# Patient Record
Sex: FEMALE | Race: WHITE | Employment: FULL TIME | URBAN - METROPOLITAN AREA
[De-identification: names, ages, dates, MRNs, and addresses within clinical notes are randomized per-mention and may not be internally consistent; named-entity substitution may affect disease eponyms.]

---

## 2017-10-17 ENCOUNTER — HOSPITAL ENCOUNTER (OUTPATIENT)
Dept: OTHER | Age: 37
Discharge: HOME | End: 2017-10-17
Attending: OBSTETRICS & GYNECOLOGY | Admitting: OBSTETRICS & GYNECOLOGY

## 2022-01-23 ENCOUNTER — APPOINTMENT (OUTPATIENT)
Dept: CT IMAGING | Age: 42
End: 2022-01-23
Payer: COMMERCIAL

## 2022-01-23 ENCOUNTER — APPOINTMENT (OUTPATIENT)
Dept: ULTRASOUND IMAGING | Age: 42
End: 2022-01-23
Payer: COMMERCIAL

## 2022-01-23 ENCOUNTER — HOSPITAL ENCOUNTER (EMERGENCY)
Age: 42
Discharge: HOME | End: 2022-01-24
Attending: EMERGENCY MEDICINE
Payer: COMMERCIAL

## 2022-01-23 DIAGNOSIS — R11.0 NAUSEA: ICD-10-CM

## 2022-01-23 DIAGNOSIS — R10.9 ABDOMINAL PAIN, UNSPECIFIED ABDOMINAL LOCATION: Primary | ICD-10-CM

## 2022-01-23 LAB
ALBUMIN SERPL-MCNC: 3.9 GM/DL (ref 3.4–5)
ALP BLD-CCNC: 101 IU/L (ref 40–129)
ALT SERPL-CCNC: 10 U/L (ref 10–40)
ANION GAP SERPL CALCULATED.3IONS-SCNC: 11 MMOL/L (ref 4–16)
AST SERPL-CCNC: 14 IU/L (ref 15–37)
BASOPHILS ABSOLUTE: 0.1 K/CU MM
BASOPHILS RELATIVE PERCENT: 0.7 % (ref 0–1)
BILIRUB SERPL-MCNC: 0.1 MG/DL (ref 0–1)
BUN BLDV-MCNC: 10 MG/DL (ref 6–23)
CALCIUM SERPL-MCNC: 8.3 MG/DL (ref 8.3–10.6)
CHLORIDE BLD-SCNC: 104 MMOL/L (ref 99–110)
CO2: 22 MMOL/L (ref 21–32)
CREAT SERPL-MCNC: 0.7 MG/DL (ref 0.6–1.1)
DIFFERENTIAL TYPE: ABNORMAL
EOSINOPHILS ABSOLUTE: 0.2 K/CU MM
EOSINOPHILS RELATIVE PERCENT: 2.8 % (ref 0–3)
GFR AFRICAN AMERICAN: >60 ML/MIN/1.73M2
GFR NON-AFRICAN AMERICAN: >60 ML/MIN/1.73M2
GLUCOSE BLD-MCNC: 93 MG/DL (ref 70–99)
HCT VFR BLD CALC: 40 % (ref 37–47)
HEMOGLOBIN: 12.4 GM/DL (ref 12.5–16)
IMMATURE NEUTROPHIL %: 0.3 % (ref 0–0.43)
LACTATE: 1.2 MMOL/L (ref 0.4–2)
LIPASE: 27 IU/L (ref 13–60)
LYMPHOCYTES ABSOLUTE: 1.7 K/CU MM
LYMPHOCYTES RELATIVE PERCENT: 24.3 % (ref 24–44)
MCH RBC QN AUTO: 29.6 PG (ref 27–31)
MCHC RBC AUTO-ENTMCNC: 31 % (ref 32–36)
MCV RBC AUTO: 95.5 FL (ref 78–100)
MONOCYTES ABSOLUTE: 0.4 K/CU MM
MONOCYTES RELATIVE PERCENT: 5 % (ref 0–4)
NUCLEATED RBC %: 0 %
PDW BLD-RTO: 12.7 % (ref 11.7–14.9)
PLATELET # BLD: 296 K/CU MM (ref 140–440)
PMV BLD AUTO: 9.8 FL (ref 7.5–11.1)
POTASSIUM SERPL-SCNC: 4 MMOL/L (ref 3.5–5.1)
RBC # BLD: 4.19 M/CU MM (ref 4.2–5.4)
SEGMENTED NEUTROPHILS ABSOLUTE COUNT: 4.8 K/CU MM
SEGMENTED NEUTROPHILS RELATIVE PERCENT: 66.9 % (ref 36–66)
SODIUM BLD-SCNC: 137 MMOL/L (ref 135–145)
TOTAL IMMATURE NEUTOROPHIL: 0.02 K/CU MM
TOTAL NUCLEATED RBC: 0 K/CU MM
TOTAL PROTEIN: 6.9 GM/DL (ref 6.4–8.2)
WBC # BLD: 7.2 K/CU MM (ref 4–10.5)

## 2022-01-23 PROCEDURE — 93975 VASCULAR STUDY: CPT

## 2022-01-23 PROCEDURE — 96361 HYDRATE IV INFUSION ADD-ON: CPT

## 2022-01-23 PROCEDURE — 2500000003 HC RX 250 WO HCPCS: Performed by: EMERGENCY MEDICINE

## 2022-01-23 PROCEDURE — 83605 ASSAY OF LACTIC ACID: CPT

## 2022-01-23 PROCEDURE — 96374 THER/PROPH/DIAG INJ IV PUSH: CPT

## 2022-01-23 PROCEDURE — 83690 ASSAY OF LIPASE: CPT

## 2022-01-23 PROCEDURE — 76856 US EXAM PELVIC COMPLETE: CPT

## 2022-01-23 PROCEDURE — 74176 CT ABD & PELVIS W/O CONTRAST: CPT

## 2022-01-23 PROCEDURE — 80053 COMPREHEN METABOLIC PANEL: CPT

## 2022-01-23 PROCEDURE — 6370000000 HC RX 637 (ALT 250 FOR IP): Performed by: EMERGENCY MEDICINE

## 2022-01-23 PROCEDURE — 99283 EMERGENCY DEPT VISIT LOW MDM: CPT

## 2022-01-23 PROCEDURE — 85025 COMPLETE CBC W/AUTO DIFF WBC: CPT

## 2022-01-23 PROCEDURE — 2580000003 HC RX 258: Performed by: EMERGENCY MEDICINE

## 2022-01-23 RX ORDER — 0.9 % SODIUM CHLORIDE 0.9 %
1000 INTRAVENOUS SOLUTION INTRAVENOUS ONCE
Status: COMPLETED | OUTPATIENT
Start: 2022-01-23 | End: 2022-01-24

## 2022-01-23 RX ORDER — ONDANSETRON 4 MG/1
4 TABLET, ORALLY DISINTEGRATING ORAL ONCE
Status: COMPLETED | OUTPATIENT
Start: 2022-01-23 | End: 2022-01-23

## 2022-01-23 RX ORDER — MORPHINE SULFATE 2 MG/ML
4 INJECTION, SOLUTION INTRAMUSCULAR; INTRAVENOUS EVERY 30 MIN PRN
Status: DISCONTINUED | OUTPATIENT
Start: 2022-01-23 | End: 2022-01-24 | Stop reason: HOSPADM

## 2022-01-23 RX ORDER — DICYCLOMINE HYDROCHLORIDE 10 MG/ML
20 INJECTION INTRAMUSCULAR ONCE
Status: COMPLETED | OUTPATIENT
Start: 2022-01-23 | End: 2022-01-24

## 2022-01-23 RX ORDER — HYDROCODONE BITARTRATE AND ACETAMINOPHEN 5; 325 MG/1; MG/1
1 TABLET ORAL ONCE
Status: COMPLETED | OUTPATIENT
Start: 2022-01-23 | End: 2022-01-23

## 2022-01-23 RX ADMIN — HYDROCODONE BITARTRATE AND ACETAMINOPHEN 1 TABLET: 5; 325 TABLET ORAL at 23:21

## 2022-01-23 RX ADMIN — FAMOTIDINE 20 MG: 10 INJECTION, SOLUTION INTRAVENOUS at 23:20

## 2022-01-23 RX ADMIN — SODIUM CHLORIDE 1000 ML: 9 INJECTION, SOLUTION INTRAVENOUS at 23:21

## 2022-01-23 RX ADMIN — ONDANSETRON 4 MG: 4 TABLET, ORALLY DISINTEGRATING ORAL at 23:21

## 2022-01-23 ASSESSMENT — ENCOUNTER SYMPTOMS
ABDOMINAL PAIN: 1
EYES NEGATIVE: 1
RESPIRATORY NEGATIVE: 1
ALLERGIC/IMMUNOLOGIC NEGATIVE: 1
NAUSEA: 1

## 2022-01-23 ASSESSMENT — PAIN SCALES - GENERAL
PAINLEVEL_OUTOF10: 9
PAINLEVEL_OUTOF10: 10

## 2022-01-23 NOTE — Clinical Note
Pita Mathew was seen and treated in our emergency department on 1/23/2022. She may return to work on 01/27/2022. If you have any questions or concerns, please don't hesitate to call.       Amando Duckworth, DO

## 2022-01-24 ENCOUNTER — APPOINTMENT (OUTPATIENT)
Dept: CT IMAGING | Age: 42
DRG: 742 | End: 2022-01-24
Payer: COMMERCIAL

## 2022-01-24 ENCOUNTER — HOSPITAL ENCOUNTER (INPATIENT)
Age: 42
LOS: 3 days | Discharge: HOME | DRG: 742 | End: 2022-01-27
Attending: EMERGENCY MEDICINE | Admitting: SURGERY
Payer: COMMERCIAL

## 2022-01-24 ENCOUNTER — ANESTHESIA (OUTPATIENT)
Dept: OPERATING ROOM | Age: 42
DRG: 742 | End: 2022-01-24
Payer: COMMERCIAL

## 2022-01-24 ENCOUNTER — ANESTHESIA EVENT (OUTPATIENT)
Dept: OPERATING ROOM | Age: 42
DRG: 742 | End: 2022-01-24
Payer: COMMERCIAL

## 2022-01-24 VITALS
SYSTOLIC BLOOD PRESSURE: 151 MMHG | TEMPERATURE: 98.6 F | OXYGEN SATURATION: 100 % | DIASTOLIC BLOOD PRESSURE: 88 MMHG | RESPIRATION RATE: 12 BRPM

## 2022-01-24 VITALS
HEART RATE: 97 BPM | DIASTOLIC BLOOD PRESSURE: 109 MMHG | OXYGEN SATURATION: 95 % | SYSTOLIC BLOOD PRESSURE: 169 MMHG | RESPIRATION RATE: 20 BRPM | TEMPERATURE: 97.5 F

## 2022-01-24 DIAGNOSIS — R10.31 RIGHT LOWER QUADRANT ABDOMINAL PAIN: ICD-10-CM

## 2022-01-24 DIAGNOSIS — K66.1 HEMOPERITONEUM: ICD-10-CM

## 2022-01-24 DIAGNOSIS — N83.511 TORSION OF RIGHT OVARY AND OVARIAN PEDICLE: Primary | ICD-10-CM

## 2022-01-24 LAB
ALBUMIN SERPL-MCNC: 3.9 GM/DL (ref 3.4–5)
ALP BLD-CCNC: 119 IU/L (ref 40–128)
ALT SERPL-CCNC: 19 U/L (ref 10–40)
ANION GAP SERPL CALCULATED.3IONS-SCNC: 9 MMOL/L (ref 4–16)
AST SERPL-CCNC: 34 IU/L (ref 15–37)
BACTERIA: ABNORMAL /HPF
BACTERIA: NEGATIVE /HPF
BASOPHILS ABSOLUTE: 0 K/CU MM
BASOPHILS RELATIVE PERCENT: 0.3 % (ref 0–1)
BILIRUB SERPL-MCNC: 0.4 MG/DL (ref 0–1)
BILIRUBIN URINE: NEGATIVE MG/DL
BILIRUBIN URINE: NEGATIVE MG/DL
BLOOD, URINE: NEGATIVE
BLOOD, URINE: NEGATIVE
BUN BLDV-MCNC: 9 MG/DL (ref 6–23)
CALCIUM SERPL-MCNC: 8.3 MG/DL (ref 8.3–10.6)
CHLORIDE BLD-SCNC: 101 MMOL/L (ref 99–110)
CLARITY: ABNORMAL
CLARITY: CLEAR
CO2: 25 MMOL/L (ref 21–32)
COLOR: ABNORMAL
COLOR: ABNORMAL
CREAT SERPL-MCNC: 0.6 MG/DL (ref 0.6–1.1)
DIFFERENTIAL TYPE: ABNORMAL
EOSINOPHILS ABSOLUTE: 0.1 K/CU MM
EOSINOPHILS RELATIVE PERCENT: 0.6 % (ref 0–3)
GFR AFRICAN AMERICAN: >60 ML/MIN/1.73M2
GFR NON-AFRICAN AMERICAN: >60 ML/MIN/1.73M2
GLUCOSE BLD-MCNC: 115 MG/DL (ref 70–99)
GLUCOSE, URINE: NEGATIVE MG/DL
GLUCOSE, URINE: NEGATIVE MG/DL
HCT VFR BLD CALC: 34.3 % (ref 37–47)
HEMOGLOBIN: 10.9 GM/DL (ref 12.5–16)
IMMATURE NEUTROPHIL %: 0.3 % (ref 0–0.43)
KETONES, URINE: ABNORMAL MG/DL
KETONES, URINE: NEGATIVE MG/DL
LEUKOCYTE ESTERASE, URINE: ABNORMAL
LEUKOCYTE ESTERASE, URINE: NEGATIVE
LIPASE: 14 IU/L (ref 13–60)
LYMPHOCYTES ABSOLUTE: 1.3 K/CU MM
LYMPHOCYTES RELATIVE PERCENT: 10.3 % (ref 24–44)
MCH RBC QN AUTO: 29.9 PG (ref 27–31)
MCHC RBC AUTO-ENTMCNC: 31.8 % (ref 32–36)
MCV RBC AUTO: 94.2 FL (ref 78–100)
MONOCYTES ABSOLUTE: 0.6 K/CU MM
MONOCYTES RELATIVE PERCENT: 4.6 % (ref 0–4)
MUCUS: ABNORMAL HPF
NITRITE URINE, QUANTITATIVE: NEGATIVE
NITRITE URINE, QUANTITATIVE: POSITIVE
NUCLEATED RBC %: 0 %
PDW BLD-RTO: 12.7 % (ref 11.7–14.9)
PH, URINE: 5 (ref 5–8)
PH, URINE: 6 (ref 5–8)
PLATELET # BLD: 266 K/CU MM (ref 140–440)
PMV BLD AUTO: 9.7 FL (ref 7.5–11.1)
POTASSIUM SERPL-SCNC: 3.9 MMOL/L (ref 3.5–5.1)
PROTEIN UA: 30 MG/DL
PROTEIN UA: NEGATIVE MG/DL
RBC # BLD: 3.64 M/CU MM (ref 4.2–5.4)
RBC URINE: ABNORMAL /HPF (ref 0–6)
RBC URINE: ABNORMAL /HPF (ref 0–6)
SARS-COV-2, NAAT: NOT DETECTED
SEGMENTED NEUTROPHILS ABSOLUTE COUNT: 10.3 K/CU MM
SEGMENTED NEUTROPHILS RELATIVE PERCENT: 83.9 % (ref 36–66)
SODIUM BLD-SCNC: 135 MMOL/L (ref 135–145)
SOURCE: NORMAL
SPECIFIC GRAVITY UA: 1.01 (ref 1–1.03)
SPECIFIC GRAVITY UA: 1.03 (ref 1–1.03)
SQUAMOUS EPITHELIAL: 1 /HPF
SQUAMOUS EPITHELIAL: 8 /HPF
TOTAL IMMATURE NEUTOROPHIL: 0.04 K/CU MM
TOTAL NUCLEATED RBC: 0 K/CU MM
TOTAL PROTEIN: 6.7 GM/DL (ref 6.4–8.2)
TRICHOMONAS: ABNORMAL /HPF
TRICHOMONAS: ABNORMAL /HPF
UROBILINOGEN, URINE: NEGATIVE MG/DL (ref 0.2–1)
UROBILINOGEN, URINE: NEGATIVE MG/DL (ref 0.2–1)
WBC # BLD: 12.3 K/CU MM (ref 4–10.5)
WBC UA: 4 /HPF (ref 0–5)
WBC UA: ABNORMAL /HPF (ref 0–5)

## 2022-01-24 PROCEDURE — 1200000000 HC SEMI PRIVATE

## 2022-01-24 PROCEDURE — 6360000002 HC RX W HCPCS: Performed by: SURGERY

## 2022-01-24 PROCEDURE — 83690 ASSAY OF LIPASE: CPT

## 2022-01-24 PROCEDURE — 99223 1ST HOSP IP/OBS HIGH 75: CPT | Performed by: SURGERY

## 2022-01-24 PROCEDURE — APPNB180 APP NON BILLABLE TIME > 60 MINS: Performed by: PHYSICIAN ASSISTANT

## 2022-01-24 PROCEDURE — 85025 COMPLETE CBC W/AUTO DIFF WBC: CPT

## 2022-01-24 PROCEDURE — 96375 TX/PRO/DX INJ NEW DRUG ADDON: CPT

## 2022-01-24 PROCEDURE — 96376 TX/PRO/DX INJ SAME DRUG ADON: CPT

## 2022-01-24 PROCEDURE — 80053 COMPREHEN METABOLIC PANEL: CPT

## 2022-01-24 PROCEDURE — 6360000004 HC RX CONTRAST MEDICATION: Performed by: EMERGENCY MEDICINE

## 2022-01-24 PROCEDURE — 87635 SARS-COV-2 COVID-19 AMP PRB: CPT

## 2022-01-24 PROCEDURE — 87086 URINE CULTURE/COLONY COUNT: CPT

## 2022-01-24 PROCEDURE — C1894 INTRO/SHEATH, NON-LASER: HCPCS | Performed by: SURGERY

## 2022-01-24 PROCEDURE — 2500000003 HC RX 250 WO HCPCS: Performed by: NURSE ANESTHETIST, CERTIFIED REGISTERED

## 2022-01-24 PROCEDURE — 81001 URINALYSIS AUTO W/SCOPE: CPT

## 2022-01-24 PROCEDURE — 7100000001 HC PACU RECOVERY - ADDTL 15 MIN: Performed by: SURGERY

## 2022-01-24 PROCEDURE — 96361 HYDRATE IV INFUSION ADD-ON: CPT

## 2022-01-24 PROCEDURE — 6360000002 HC RX W HCPCS: Performed by: EMERGENCY MEDICINE

## 2022-01-24 PROCEDURE — 3600000004 HC SURGERY LEVEL 4 BASE: Performed by: SURGERY

## 2022-01-24 PROCEDURE — 6360000002 HC RX W HCPCS: Performed by: ANESTHESIOLOGY

## 2022-01-24 PROCEDURE — 0UB50ZZ EXCISION OF RIGHT FALLOPIAN TUBE, OPEN APPROACH: ICD-10-PCS | Performed by: SURGERY

## 2022-01-24 PROCEDURE — 7100000000 HC PACU RECOVERY - FIRST 15 MIN: Performed by: SURGERY

## 2022-01-24 PROCEDURE — 74177 CT ABD & PELVIS W/CONTRAST: CPT

## 2022-01-24 PROCEDURE — 44955 APPENDECTOMY ADD-ON: CPT | Performed by: PHYSICIAN ASSISTANT

## 2022-01-24 PROCEDURE — 6360000002 HC RX W HCPCS: Performed by: PHYSICIAN ASSISTANT

## 2022-01-24 PROCEDURE — 99284 EMERGENCY DEPT VISIT MOD MDM: CPT

## 2022-01-24 PROCEDURE — 58720 REMOVAL OF OVARY/TUBE(S): CPT | Performed by: SURGERY

## 2022-01-24 PROCEDURE — 3700000000 HC ANESTHESIA ATTENDED CARE: Performed by: SURGERY

## 2022-01-24 PROCEDURE — 96372 THER/PROPH/DIAG INJ SC/IM: CPT

## 2022-01-24 PROCEDURE — 0UB00ZZ EXCISION OF RIGHT OVARY, OPEN APPROACH: ICD-10-PCS | Performed by: SURGERY

## 2022-01-24 PROCEDURE — 96374 THER/PROPH/DIAG INJ IV PUSH: CPT

## 2022-01-24 PROCEDURE — 3700000001 HC ADD 15 MINUTES (ANESTHESIA): Performed by: SURGERY

## 2022-01-24 PROCEDURE — 88305 TISSUE EXAM BY PATHOLOGIST: CPT | Performed by: PATHOLOGY

## 2022-01-24 PROCEDURE — 88304 TISSUE EXAM BY PATHOLOGIST: CPT | Performed by: PATHOLOGY

## 2022-01-24 PROCEDURE — 3600000014 HC SURGERY LEVEL 4 ADDTL 15MIN: Performed by: SURGERY

## 2022-01-24 PROCEDURE — 2720000010 HC SURG SUPPLY STERILE: Performed by: SURGERY

## 2022-01-24 PROCEDURE — 2580000003 HC RX 258: Performed by: PHYSICIAN ASSISTANT

## 2022-01-24 PROCEDURE — 58720 REMOVAL OF OVARY/TUBE(S): CPT | Performed by: PHYSICIAN ASSISTANT

## 2022-01-24 PROCEDURE — 44955 APPENDECTOMY ADD-ON: CPT | Performed by: SURGERY

## 2022-01-24 PROCEDURE — 0DTJ0ZZ RESECTION OF APPENDIX, OPEN APPROACH: ICD-10-PCS | Performed by: SURGERY

## 2022-01-24 PROCEDURE — 6360000002 HC RX W HCPCS: Performed by: NURSE ANESTHETIST, CERTIFIED REGISTERED

## 2022-01-24 PROCEDURE — 2580000003 HC RX 258: Performed by: ANESTHESIOLOGY

## 2022-01-24 PROCEDURE — 2709999900 HC NON-CHARGEABLE SUPPLY: Performed by: SURGERY

## 2022-01-24 RX ORDER — CEFAZOLIN SODIUM 2 G/100ML
2000 INJECTION, SOLUTION INTRAVENOUS ONCE
Status: COMPLETED | OUTPATIENT
Start: 2022-01-24 | End: 2022-01-24

## 2022-01-24 RX ORDER — FENTANYL CITRATE 50 UG/ML
25 INJECTION, SOLUTION INTRAMUSCULAR; INTRAVENOUS EVERY 5 MIN PRN
Status: DISCONTINUED | OUTPATIENT
Start: 2022-01-24 | End: 2022-01-24 | Stop reason: HOSPADM

## 2022-01-24 RX ORDER — MORPHINE SULFATE 2 MG/ML
2 INJECTION, SOLUTION INTRAMUSCULAR; INTRAVENOUS
Status: DISCONTINUED | OUTPATIENT
Start: 2022-01-24 | End: 2022-01-25 | Stop reason: CLARIF

## 2022-01-24 RX ORDER — SODIUM CHLORIDE 9 MG/ML
INJECTION, SOLUTION INTRAVENOUS CONTINUOUS
Status: DISCONTINUED | OUTPATIENT
Start: 2022-01-24 | End: 2022-01-26

## 2022-01-24 RX ORDER — ONDANSETRON 4 MG/1
4 TABLET, ORALLY DISINTEGRATING ORAL EVERY 8 HOURS PRN
Qty: 15 TABLET | Refills: 0 | Status: SHIPPED | OUTPATIENT
Start: 2022-01-24 | End: 2022-02-10

## 2022-01-24 RX ORDER — HYDROCODONE BITARTRATE AND ACETAMINOPHEN 5; 325 MG/1; MG/1
1-2 TABLET ORAL EVERY 6 HOURS PRN
Qty: 10 TABLET | Refills: 0 | Status: ON HOLD | OUTPATIENT
Start: 2022-01-24 | End: 2022-01-27 | Stop reason: HOSPADM

## 2022-01-24 RX ORDER — FENTANYL CITRATE 50 UG/ML
50 INJECTION, SOLUTION INTRAMUSCULAR; INTRAVENOUS EVERY 5 MIN PRN
Status: DISCONTINUED | OUTPATIENT
Start: 2022-01-24 | End: 2022-01-24 | Stop reason: HOSPADM

## 2022-01-24 RX ORDER — ONDANSETRON 2 MG/ML
4 INJECTION INTRAMUSCULAR; INTRAVENOUS EVERY 30 MIN PRN
Status: DISCONTINUED | OUTPATIENT
Start: 2022-01-24 | End: 2022-01-24

## 2022-01-24 RX ORDER — ROCURONIUM BROMIDE 10 MG/ML
INJECTION, SOLUTION INTRAVENOUS PRN
Status: DISCONTINUED | OUTPATIENT
Start: 2022-01-24 | End: 2022-01-24 | Stop reason: SDUPTHER

## 2022-01-24 RX ORDER — LIDOCAINE HYDROCHLORIDE 20 MG/ML
INJECTION, SOLUTION INTRAVENOUS PRN
Status: DISCONTINUED | OUTPATIENT
Start: 2022-01-24 | End: 2022-01-24 | Stop reason: SDUPTHER

## 2022-01-24 RX ORDER — ONDANSETRON 2 MG/ML
4 INJECTION INTRAMUSCULAR; INTRAVENOUS EVERY 6 HOURS PRN
Status: DISCONTINUED | OUTPATIENT
Start: 2022-01-24 | End: 2022-01-27 | Stop reason: HOSPADM

## 2022-01-24 RX ORDER — ONDANSETRON 4 MG/1
4 TABLET, ORALLY DISINTEGRATING ORAL EVERY 8 HOURS PRN
Status: DISCONTINUED | OUTPATIENT
Start: 2022-01-24 | End: 2022-01-27 | Stop reason: HOSPADM

## 2022-01-24 RX ORDER — KETOROLAC TROMETHAMINE 30 MG/ML
INJECTION, SOLUTION INTRAMUSCULAR; INTRAVENOUS PRN
Status: DISCONTINUED | OUTPATIENT
Start: 2022-01-24 | End: 2022-01-24 | Stop reason: SDUPTHER

## 2022-01-24 RX ORDER — DICYCLOMINE HYDROCHLORIDE 10 MG/1
10 CAPSULE ORAL 3 TIMES DAILY
Qty: 15 CAPSULE | Refills: 3 | Status: SHIPPED | OUTPATIENT
Start: 2022-01-24 | End: 2022-02-10

## 2022-01-24 RX ORDER — SODIUM CHLORIDE, SODIUM LACTATE, POTASSIUM CHLORIDE, CALCIUM CHLORIDE 600; 310; 30; 20 MG/100ML; MG/100ML; MG/100ML; MG/100ML
INJECTION, SOLUTION INTRAVENOUS CONTINUOUS
Status: DISCONTINUED | OUTPATIENT
Start: 2022-01-24 | End: 2022-01-24

## 2022-01-24 RX ORDER — SODIUM CHLORIDE 0.9 % (FLUSH) 0.9 %
10 SYRINGE (ML) INJECTION EVERY 12 HOURS SCHEDULED
Status: DISCONTINUED | OUTPATIENT
Start: 2022-01-24 | End: 2022-01-27 | Stop reason: HOSPADM

## 2022-01-24 RX ORDER — HYDRALAZINE HYDROCHLORIDE 20 MG/ML
5 INJECTION INTRAMUSCULAR; INTRAVENOUS EVERY 10 MIN PRN
Status: DISCONTINUED | OUTPATIENT
Start: 2022-01-24 | End: 2022-01-24 | Stop reason: HOSPADM

## 2022-01-24 RX ORDER — KETOROLAC TROMETHAMINE 30 MG/ML
30 INJECTION, SOLUTION INTRAMUSCULAR; INTRAVENOUS ONCE
Status: COMPLETED | OUTPATIENT
Start: 2022-01-24 | End: 2022-01-24

## 2022-01-24 RX ORDER — CALCIUM CARBONATE 200(500)MG
1 TABLET,CHEWABLE ORAL DAILY
Qty: 30 TABLET | Refills: 0 | Status: SHIPPED | OUTPATIENT
Start: 2022-01-24 | End: 2022-02-23

## 2022-01-24 RX ORDER — FAMOTIDINE 20 MG/1
20 TABLET, FILM COATED ORAL 2 TIMES DAILY
Qty: 14 TABLET | Refills: 0 | Status: SHIPPED | OUTPATIENT
Start: 2022-01-24 | End: 2022-02-10

## 2022-01-24 RX ORDER — MORPHINE SULFATE 2 MG/ML
4 INJECTION, SOLUTION INTRAMUSCULAR; INTRAVENOUS EVERY 30 MIN PRN
Status: DISCONTINUED | OUTPATIENT
Start: 2022-01-24 | End: 2022-01-24

## 2022-01-24 RX ORDER — OXYCODONE HYDROCHLORIDE 5 MG/1
5 TABLET ORAL EVERY 4 HOURS PRN
Status: DISCONTINUED | OUTPATIENT
Start: 2022-01-24 | End: 2022-01-26

## 2022-01-24 RX ORDER — ONDANSETRON 2 MG/ML
4 INJECTION INTRAMUSCULAR; INTRAVENOUS
Status: DISCONTINUED | OUTPATIENT
Start: 2022-01-24 | End: 2022-01-24 | Stop reason: HOSPADM

## 2022-01-24 RX ORDER — ONDANSETRON 2 MG/ML
INJECTION INTRAMUSCULAR; INTRAVENOUS PRN
Status: DISCONTINUED | OUTPATIENT
Start: 2022-01-24 | End: 2022-01-24 | Stop reason: SDUPTHER

## 2022-01-24 RX ORDER — FENTANYL CITRATE 50 UG/ML
INJECTION, SOLUTION INTRAMUSCULAR; INTRAVENOUS PRN
Status: DISCONTINUED | OUTPATIENT
Start: 2022-01-24 | End: 2022-01-24 | Stop reason: SDUPTHER

## 2022-01-24 RX ORDER — LABETALOL HYDROCHLORIDE 5 MG/ML
5 INJECTION, SOLUTION INTRAVENOUS EVERY 10 MIN PRN
Status: DISCONTINUED | OUTPATIENT
Start: 2022-01-24 | End: 2022-01-24 | Stop reason: HOSPADM

## 2022-01-24 RX ORDER — PROPOFOL 10 MG/ML
INJECTION, EMULSION INTRAVENOUS PRN
Status: DISCONTINUED | OUTPATIENT
Start: 2022-01-24 | End: 2022-01-24 | Stop reason: SDUPTHER

## 2022-01-24 RX ORDER — SODIUM CHLORIDE 0.9 % (FLUSH) 0.9 %
10 SYRINGE (ML) INJECTION PRN
Status: DISCONTINUED | OUTPATIENT
Start: 2022-01-24 | End: 2022-01-27 | Stop reason: HOSPADM

## 2022-01-24 RX ORDER — SODIUM CHLORIDE 9 MG/ML
25 INJECTION, SOLUTION INTRAVENOUS PRN
Status: DISCONTINUED | OUTPATIENT
Start: 2022-01-24 | End: 2022-01-27 | Stop reason: HOSPADM

## 2022-01-24 RX ORDER — NAPROXEN 500 MG/1
500 TABLET ORAL 2 TIMES DAILY
Qty: 60 TABLET | Refills: 0 | Status: SHIPPED | OUTPATIENT
Start: 2022-01-24 | End: 2022-02-10

## 2022-01-24 RX ORDER — DEXAMETHASONE SODIUM PHOSPHATE 4 MG/ML
INJECTION, SOLUTION INTRA-ARTICULAR; INTRALESIONAL; INTRAMUSCULAR; INTRAVENOUS; SOFT TISSUE PRN
Status: DISCONTINUED | OUTPATIENT
Start: 2022-01-24 | End: 2022-01-24 | Stop reason: SDUPTHER

## 2022-01-24 RX ADMIN — ROCURONIUM BROMIDE 15 MG: 10 INJECTION INTRAVENOUS at 17:56

## 2022-01-24 RX ADMIN — KETOROLAC TROMETHAMINE 30 MG: 30 INJECTION, SOLUTION INTRAMUSCULAR; INTRAVENOUS at 00:58

## 2022-01-24 RX ADMIN — SUGAMMADEX 200 MG: 100 INJECTION, SOLUTION INTRAVENOUS at 18:21

## 2022-01-24 RX ADMIN — SODIUM CHLORIDE, POTASSIUM CHLORIDE, SODIUM LACTATE AND CALCIUM CHLORIDE: 600; 310; 30; 20 INJECTION, SOLUTION INTRAVENOUS at 17:06

## 2022-01-24 RX ADMIN — LIDOCAINE HYDROCHLORIDE 100 MG: 20 INJECTION, SOLUTION INTRAVENOUS at 17:21

## 2022-01-24 RX ADMIN — FENTANYL CITRATE 50 MCG: 50 INJECTION, SOLUTION INTRAMUSCULAR; INTRAVENOUS at 17:19

## 2022-01-24 RX ADMIN — FENTANYL CITRATE 50 MCG: 50 INJECTION, SOLUTION INTRAMUSCULAR; INTRAVENOUS at 17:35

## 2022-01-24 RX ADMIN — CEFAZOLIN SODIUM 2000 MG: 2 INJECTION, SOLUTION INTRAVENOUS at 17:28

## 2022-01-24 RX ADMIN — ONDANSETRON 4 MG: 2 INJECTION INTRAMUSCULAR; INTRAVENOUS at 18:15

## 2022-01-24 RX ADMIN — MORPHINE SULFATE 4 MG: 2 INJECTION, SOLUTION INTRAMUSCULAR; INTRAVENOUS at 15:22

## 2022-01-24 RX ADMIN — FENTANYL CITRATE 50 MCG: 50 INJECTION, SOLUTION INTRAMUSCULAR; INTRAVENOUS at 19:06

## 2022-01-24 RX ADMIN — MORPHINE SULFATE 4 MG: 2 INJECTION, SOLUTION INTRAMUSCULAR; INTRAVENOUS at 04:01

## 2022-01-24 RX ADMIN — MORPHINE SULFATE 2 MG: 2 INJECTION, SOLUTION INTRAMUSCULAR; INTRAVENOUS at 22:25

## 2022-01-24 RX ADMIN — FENTANYL CITRATE 50 MCG: 50 INJECTION, SOLUTION INTRAMUSCULAR; INTRAVENOUS at 19:00

## 2022-01-24 RX ADMIN — ROCURONIUM BROMIDE 50 MG: 10 INJECTION INTRAVENOUS at 17:21

## 2022-01-24 RX ADMIN — DICYCLOMINE HYDROCHLORIDE 20 MG: 20 INJECTION, SOLUTION INTRAMUSCULAR at 00:57

## 2022-01-24 RX ADMIN — PROPOFOL 150 MG: 10 INJECTION, EMULSION INTRAVENOUS at 17:21

## 2022-01-24 RX ADMIN — DEXAMETHASONE SODIUM PHOSPHATE 4 MG: 4 INJECTION, SOLUTION INTRAMUSCULAR; INTRAVENOUS at 17:30

## 2022-01-24 RX ADMIN — ONDANSETRON 4 MG: 2 INJECTION INTRAMUSCULAR; INTRAVENOUS at 13:51

## 2022-01-24 RX ADMIN — MORPHINE SULFATE 4 MG: 2 INJECTION, SOLUTION INTRAMUSCULAR; INTRAVENOUS at 13:52

## 2022-01-24 RX ADMIN — KETOROLAC TROMETHAMINE 30 MG: 30 INJECTION, SOLUTION INTRAMUSCULAR at 18:15

## 2022-01-24 RX ADMIN — SODIUM CHLORIDE: 9 INJECTION, SOLUTION INTRAVENOUS at 18:57

## 2022-01-24 RX ADMIN — FENTANYL CITRATE 50 MCG: 50 INJECTION, SOLUTION INTRAMUSCULAR; INTRAVENOUS at 17:50

## 2022-01-24 RX ADMIN — IOPAMIDOL 80 ML: 755 INJECTION, SOLUTION INTRAVENOUS at 13:30

## 2022-01-24 RX ADMIN — MORPHINE SULFATE 4 MG: 2 INJECTION, SOLUTION INTRAMUSCULAR; INTRAVENOUS at 02:07

## 2022-01-24 ASSESSMENT — PULMONARY FUNCTION TESTS
PIF_VALUE: 19
PIF_VALUE: 18
PIF_VALUE: 19
PIF_VALUE: 18
PIF_VALUE: 0
PIF_VALUE: 19
PIF_VALUE: 17
PIF_VALUE: 23
PIF_VALUE: 1
PIF_VALUE: 18
PIF_VALUE: 17
PIF_VALUE: 19
PIF_VALUE: 18
PIF_VALUE: 18
PIF_VALUE: 0
PIF_VALUE: 17
PIF_VALUE: 0
PIF_VALUE: 18
PIF_VALUE: 20
PIF_VALUE: 18
PIF_VALUE: 17
PIF_VALUE: 19
PIF_VALUE: 18
PIF_VALUE: 5
PIF_VALUE: 25
PIF_VALUE: 18
PIF_VALUE: 3
PIF_VALUE: 18
PIF_VALUE: 19
PIF_VALUE: 6
PIF_VALUE: 0
PIF_VALUE: 19
PIF_VALUE: 1
PIF_VALUE: 18
PIF_VALUE: 15
PIF_VALUE: 18
PIF_VALUE: 19
PIF_VALUE: 17
PIF_VALUE: 18
PIF_VALUE: 19
PIF_VALUE: 18
PIF_VALUE: 19
PIF_VALUE: 17
PIF_VALUE: 18
PIF_VALUE: 21
PIF_VALUE: 18
PIF_VALUE: 19
PIF_VALUE: 19
PIF_VALUE: 18
PIF_VALUE: 19
PIF_VALUE: 0
PIF_VALUE: 1
PIF_VALUE: 19
PIF_VALUE: 18
PIF_VALUE: 18
PIF_VALUE: 1
PIF_VALUE: 12
PIF_VALUE: 18
PIF_VALUE: 1
PIF_VALUE: 19
PIF_VALUE: 19
PIF_VALUE: 18
PIF_VALUE: 19
PIF_VALUE: 18
PIF_VALUE: 19
PIF_VALUE: 18
PIF_VALUE: 19

## 2022-01-24 ASSESSMENT — ENCOUNTER SYMPTOMS
CONSTIPATION: 0
EYE REDNESS: 0
ANAL BLEEDING: 0
CHOKING: 0
PHOTOPHOBIA: 0
EYE ITCHING: 0
COLOR CHANGE: 0
SORE THROAT: 0
NAUSEA: 1
ABDOMINAL PAIN: 1
APNEA: 0
BACK PAIN: 0
STRIDOR: 0
RECTAL PAIN: 0

## 2022-01-24 ASSESSMENT — PAIN DESCRIPTION - DESCRIPTORS
DESCRIPTORS: ACHING

## 2022-01-24 ASSESSMENT — PAIN DESCRIPTION - ONSET
ONSET: ON-GOING
ONSET: ON-GOING

## 2022-01-24 ASSESSMENT — PAIN DESCRIPTION - PAIN TYPE
TYPE: ACUTE PAIN
TYPE: SURGICAL PAIN
TYPE: SURGICAL PAIN

## 2022-01-24 ASSESSMENT — PAIN - FUNCTIONAL ASSESSMENT: PAIN_FUNCTIONAL_ASSESSMENT: 0-10

## 2022-01-24 ASSESSMENT — PAIN DESCRIPTION - DIRECTION: RADIATING_TOWARDS: BACK

## 2022-01-24 ASSESSMENT — PAIN DESCRIPTION - LOCATION
LOCATION: ABDOMEN

## 2022-01-24 ASSESSMENT — PAIN SCALES - GENERAL
PAINLEVEL_OUTOF10: 8
PAINLEVEL_OUTOF10: 0
PAINLEVEL_OUTOF10: 10
PAINLEVEL_OUTOF10: 7
PAINLEVEL_OUTOF10: 10
PAINLEVEL_OUTOF10: 7
PAINLEVEL_OUTOF10: 0
PAINLEVEL_OUTOF10: 7

## 2022-01-24 ASSESSMENT — PAIN DESCRIPTION - ORIENTATION
ORIENTATION: RIGHT
ORIENTATION: MID
ORIENTATION: MID

## 2022-01-24 ASSESSMENT — PAIN DESCRIPTION - FREQUENCY
FREQUENCY: CONTINUOUS
FREQUENCY: CONTINUOUS

## 2022-01-24 NOTE — OP NOTE
Procedure Note:      Patient ID:  Medina Toure  [id number]  35 y.o.  1980    Indications: This is a 75-year-old patient who presented with worsening abdominal pain to the ER. Patient presented to the ED yesterday with similar complaints and extensive work-up was performed by ED physician which included labs pelvic ultrasound and CT the abdomen pelvis. These were all read as normal.  Patient was advised to return back if her pain worsened and which the patient did. DDS examination patient had worsening abdominal pain and repeat CT without contrast showed pelvic fluid which was not present yesterday. And due to worsening physical examination patient was taken to the operating room for exploration. Pre-operative Diagnosis: Status post gastric bypass with possible internal hernia    Post-operative Diagnosis: Hemoperitoneum secondary to torsed and ischemic right adnexa and abnormal appendix with mucinous growth the tip of the appendix. Procedure: Exploratory laparotomy with right oophorectomy and appendectomy and abdominal washout    Surgeon: Saray Vera MD     First Assistant: Radha Wiseman PA-C  The  Use of a first assistant was necessary for the proper positioning, prepping, and draping of the patient, as well as the safe and expeditious execution of the case and closure of skin and subcutaneous tissues. Findings: As above    Estimated Blood Loss: 500 cc of hemoperitoneum, minimal from surgical procedure           Total IV Fluids: 1000 ml            Complications:  None; patient tolerated the procedure well. Disposition: PACU - hemodynamically stable. Condition: stable    Procedure Details   The patient was seen in the Holding Room. The risks, benefits, complications, treatment options, and expected outcomes were discussed with the patient.  The possibilities of reaction to medication, pulmonary aspiration, perforation of viscus, bleeding, recurrent infection, finding a normal colon, the need for additional procedures, failure to diagnose a condition, and creating a complication requiring transfusion or operation were discussed with the patient. The patient concurred with the proposed plan, giving informed consent. The patient was taken to the operating room, identified and the procedure verified as the consent form. A Time Out was held and the above information confirmed. Procedure Description    The patient was brought into the operating room placed supine. After induction of anesthesia the abdomen was prepped and draped in a sterile fashion. Tidwell catheter was inserted aseptically. A midline incision was made using the scalpel. A Bovie cautery was used to divided the subcutaneous tissue and the peritoneum was entered without incidence. There was minimal intra-abdominal adhesions. Upon entering the abdomen there was hemoperitoneum noted. The hemoperitoneum was mainly concentrated in the pelvis. There is large clot within the pelvis the size of grapefruit which was evacuated. Then the right ovary and fallopian tube were identified which was torsed x2 and profound ischemia. I untwisted the ovary and fallopian tube but this was not improved the viability. At this point Rubia Speak clamp was used to ligate the ovarian vessels and the ovary and the right fallopian tube were sent to pathology. The left ovary and fallopian tube were surgically missing. Then on pelvic examination the appendix was identified with a mucinous appearing growth at the tip of the appendix. I elected to proceed with the resection of the appendix and the mesoappendix. This was done using Bovie cautery to take the mesoappendix down with Natacha clamps. This was tied off. The appendix was resected at the base using a 55 KAREEM stapler. This was also sent to pathology for evaluation. The small bowel was run backwards from the terminal ileum. There is no internal hernia causing any obstruction.   There is a small mesenteric defect at the jejunojejunostomy. This was closed using 3-0 silk. The remainder of the anatomy appeared within normal limits. Copious irrigation of the abdominal cavity was performed and hemostasis was achieved to satisfaction. The abdominal wall fascia was closed using #1 PDS suture. The subcutaneous tissue was then closed using 3-0 Vicryl followed by staples for the skin. The patient was ultimately transferred to recovery room in stable condition.       Alva Otero MD

## 2022-01-24 NOTE — ED PROVIDER NOTES
Triage Chief Complaint:   Abdominal Pain    [place]:  Live Barajas is a 35 y.o. female that presents with right lower quadrant abdominal pain. The abdominal pain started fairly acutely yesterday. She was seen in the emergency room last night where labs, a CT scan without contrast and pelvic ultrasound were completed and were unremarkable. She came back today prior to the 12-hour recheck because her symptoms are so significant. She feels like her abdomen has become slightly distended since last night. The pain in her right lower quadrant does radiate to her back. The pain increases if she lays on her right side. She has nausea and dry heaves. She states she has not had a bowel movement since his pain started and she does not believe that she is passing gas. She denies any fevers. No dysuria or increased urinary frequency. She has never had pain like this previously. She does not feel like the pain is getting any better with pain medication. Patient has a history of previous hernia repair, gastric bypass surgery, , hysterectomy and cholecystectomy. ROS:   Review of Systems   Constitutional: Positive for appetite change (decreased). Negative for chills and fever. HENT: Negative for congestion, rhinorrhea and sore throat. Eyes: Negative for redness and visual disturbance. Respiratory: Negative for cough and shortness of breath. Cardiovascular: Negative for chest pain and leg swelling. Gastrointestinal: Positive for abdominal distention, abdominal pain (RLQ), constipation and nausea. Negative for vomiting. Genitourinary: Negative for dysuria and frequency. Musculoskeletal: Negative for arthralgias and back pain. Skin: Negative for rash and wound. Neurological: Negative for syncope and headaches. Psychiatric/Behavioral: Negative. Negative for hallucinations and suicidal ideas. History reviewed. No pertinent past medical history.   Past Surgical History:   Procedure Laterality Date    ABDOMINOPLASTY       SECTION, LOW TRANSVERSE  1997    ENDOMETRIAL ABLATION      GASTRIC BYPASS SURGERY      HYSTERECTOMY      LAPAROTOMY N/A 2022    LAPAROTOMY EXPLORATORY APPENDECTOMY RIGHT OOPHERECTOMY FOR OVARIAN TORSION performed by Alva Otero MD at [facility]       History reviewed. No pertinent family history. Social History     Socioeconomic History    Marital status:      Spouse name: Not on file    Number of children: Not on file    Years of education: Not on file    Highest education level: Not on file   Occupational History    Not on file   Tobacco Use    Smoking status: Former Smoker     Types: Cigarettes     Quit date: 2009     Years since quittin.7    Smokeless tobacco: Never Used   Substance and Sexual Activity    Alcohol use: No    Drug use: No    Sexual activity: Yes     Partners: Male   Other Topics Concern    Not on file   Social History Narrative    Not on file     Social Determinants of Health     Financial Resource Strain:     Difficulty of Paying Living Expenses: Not on file   Food Insecurity:     Worried About Running Out of Food in the Last Year: Not on file    Darrian of Food in the Last Year: Not on file   Transportation Needs:     Lack of Transportation (Medical): Not on file    Lack of Transportation (Non-Medical):  Not on file   Physical Activity:     Days of Exercise per Week: Not on file    Minutes of Exercise per Session: Not on file   Stress:     Feeling of Stress : Not on file   Social Connections:     Frequency of Communication with Friends and Family: Not on file    Frequency of Social Gatherings with Friends and Family: Not on file    Attends Buddhist Services: Not on file    Active Member of Clubs or Organizations: Not on file    Attends Club or Organization Meetings: Not on file    Marital Status: Not on file   Intimate Partner Violence:     Fear of Current or Ex-Partner: Not on file    Emotionally Abused: Not on file    Physically Abused: Not on file    Sexually Abused: Not on file   Housing Stability:     Unable to Pay for Housing in the Last Year: Not on file    Number of Places Lived in the Last Year: Not on file    Unstable Housing in the Last Year: Not on file     No current facility-administered medications for this encounter. Current Outpatient Medications   Medication Sig Dispense Refill    cyclobenzaprine (FLEXERIL) 10 MG tablet Take 1 tablet by mouth 3 times daily as needed for Muscle spasms 30 tablet 0    HYDROcodone-acetaminophen (NORCO) 5-325 MG per tablet Take 1 tablet by mouth every 6 hours as needed for Pain for up to 3 days. 10 tablet 0    ferrous sulfate (IRON 325) 325 (65 Fe) MG tablet Take 1 tablet by mouth daily (with breakfast) 30 tablet 0    ondansetron (ZOFRAN ODT) 4 MG disintegrating tablet Take 1 tablet by mouth every 8 hours as needed for Nausea 15 tablet 0    famotidine (PEPCID) 20 MG tablet Take 1 tablet by mouth 2 times daily 14 tablet 0    dicyclomine (BENTYL) 10 MG capsule Take 1 capsule by mouth 3 times daily As needed for abdominal pain 15 capsule 3    calcium carbonate (ANTACID) 500 MG chewable tablet Take 1 tablet by mouth daily 30 tablet 0    naproxen (NAPROSYN) 500 MG tablet Take 1 tablet by mouth 2 times daily 60 tablet 0    naproxen (NAPROSYN) 500 MG tablet Take 1 tablet by mouth 2 times daily 60 tablet 0     Allergies   Allergen Reactions    Amoxicillin Hives       Nursing Notes Reviewed     Physical Exam:   ED Triage Vitals [01/24/22 1132]   Enc Vitals Group      BP (!) 160/97      Pulse 66      Resp 16      Temp 98 °F (36.7 °C)      Temp Source Oral      SpO2 99 %      Weight 150 lb (68 kg)      Height 5' 3\" (1.6 m)      Head Circumference       Peak Flow       Pain Score       Pain Loc       Pain Edu? Excl. in [address]?       /82   Pulse 86   Temp 97.3 °F (36.3 °C) (Oral)   Resp 16   Ht RBC 3.64 (L) 4.2 - 5.4 M/CU MM    Hemoglobin 10.9 (L) 12.5 - 16.0 GM/DL    Hematocrit 34.3 (L) 37 - 47 %    MCV 94.2 78 - 100 FL    MCH 29.9 27 - 31 PG    MCHC 31.8 (L) 32.0 - 36.0 %    RDW 12.7 11.7 - 14.9 %    Platelets 086 050 - 236 K/CU MM    MPV 9.7 7.5 - 11.1 FL    Differential Type AUTOMATED DIFFERENTIAL     Segs Relative 83.9 (H) 36 - 66 %    Lymphocytes % 10.3 (L) 24 - 44 %    Monocytes % 4.6 (H) 0 - 4 %    Eosinophils % 0.6 0 - 3 %    Basophils % 0.3 0 - 1 %    Segs Absolute 10.3 K/CU MM    Lymphocytes Absolute 1.3 K/CU MM    Monocytes Absolute 0.6 K/CU MM    Eosinophils Absolute 0.1 K/CU MM    Basophils Absolute 0.0 K/CU MM    Nucleated RBC % 0.0 %    Total Nucleated RBC 0.0 K/CU MM    Total Immature Neutrophil 0.04 K/CU MM    Immature Neutrophil % 0.3 0 - 0.43 %   Comprehensive Metabolic Panel w/ Reflex to MG   Result Value Ref Range    Sodium 135 135 - 145 MMOL/L    Potassium 3.9 3.5 - 5.1 MMOL/L    Chloride 101 99 - 110 mMol/L    CO2 25 21 - 32 MMOL/L    BUN 9 6 - 23 MG/DL    CREATININE 0.6 0.6 - 1.1 MG/DL    Glucose 115 (H) 70 - 99 MG/DL    Calcium 8.3 8.3 - 10.6 MG/DL    Albumin 3.9 3.4 - 5.0 GM/DL    Total Protein 6.7 6.4 - 8.2 GM/DL    Total Bilirubin 0.4 0.0 - 1.0 MG/DL    ALT 19 10 - 40 U/L    AST 34 15 - 37 IU/L    Alkaline Phosphatase 119 40 - 128 IU/L    GFR Non-African American >60 >60 mL/min/1.73m2    GFR African American >60 >60 mL/min/1.73m2    Anion Gap 9 4 - 16   Lipase   Result Value Ref Range    Lipase 14 13 - 60 IU/L   Urinalysis with microscopic   Result Value Ref Range    Color, UA SHANNON (A) YELLOW    Clarity, UA SLIGHTLY CLOUDY (A) CLEAR    Glucose, Urine NEGATIVE NEGATIVE MG/DL    Bilirubin Urine NEGATIVE NEGATIVE MG/DL    Ketones, Urine SMALL (A) NEGATIVE MG/DL    Specific Gravity, UA 1.028 1.001 - 1.035    Blood, Urine NEGATIVE NEGATIVE    pH, Urine 5.0 5.0 - 8.0    Protein, UA 30 (A) NEGATIVE MG/DL    Urobilinogen, Urine NEGATIVE 0.2 - 1.0 MG/DL    Nitrite Urine, Quantitative POSITIVE (A) NEGATIVE    Leukocyte Esterase, Urine TRACE (A) NEGATIVE    RBC, UA NONE SEEN 0 - 6 /HPF    WBC, UA 4 0 - 5 /HPF    Bacteria, UA MANY (A) NEGATIVE /HPF    Squam Epithel, UA 8 /HPF    Mucus, UA FEW (A) NEGATIVE HPF    Trichomonas, UA NONE SEEN NONE SEEN /HPF   CBC auto differential   Result Value Ref Range    WBC 11.5 (H) 4.0 - 10.5 K/CU MM    RBC 2.67 (L) 4.2 - 5.4 M/CU MM    Hemoglobin 7.9 (L) 12.5 - 16.0 GM/DL    Hematocrit 25.9 (L) 37 - 47 %    MCV 97.0 78 - 100 FL    MCH 29.6 27 - 31 PG    MCHC 30.5 (L) 32.0 - 36.0 %    RDW 13.3 11.7 - 14.9 %    Platelets 001 844 - 595 K/CU MM    MPV 10.6 7.5 - 11.1 FL    Differential Type AUTOMATED DIFFERENTIAL     Segs Relative 87.9 (H) 36 - 66 %    Lymphocytes % 5.6 (L) 24 - 44 %    Monocytes % 5.9 (H) 0 - 4 %    Eosinophils % 0.0 0 - 3 %    Basophils % 0.3 0 - 1 %    Segs Absolute 10.1 K/CU MM    Lymphocytes Absolute 0.7 K/CU MM    Monocytes Absolute 0.7 K/CU MM    Eosinophils Absolute 0.0 K/CU MM    Basophils Absolute 0.0 K/CU MM    Nucleated RBC % 0.0 %    Total Nucleated RBC 0.0 K/CU MM    Total Immature Neutrophil 0.03 K/CU MM    Immature Neutrophil % 0.3 0 - 0.43 %   Comprehensive Metabolic Panel   Result Value Ref Range    Sodium 139 135 - 145 MMOL/L    Potassium 4.6 3.5 - 5.1 MMOL/L    Chloride 105 99 - 110 mMol/L    CO2 23 21 - 32 MMOL/L    BUN 9 6 - 23 MG/DL    CREATININE 0.7 0.6 - 1.1 MG/DL    Glucose 126 (H) 70 - 99 MG/DL    Calcium 7.9 (L) 8.3 - 10.6 MG/DL    Albumin 2.9 (L) 3.4 - 5.0 GM/DL    Total Protein 5.0 (L) 6.4 - 8.2 GM/DL    Total Bilirubin 0.3 0.0 - 1.0 MG/DL    ALT 11 10 - 40 U/L    AST 15 15 - 37 IU/L    Alkaline Phosphatase 93 40 - 129 IU/L    GFR Non-African American >60 >60 mL/min/1.73m2    GFR African American >60 >60 mL/min/1.73m2    Anion Gap 11 4 - 16   CBC   Result Value Ref Range    WBC 5.7 4.0 - 10.5 K/CU MM    RBC 2.14 (L) 4.2 - 5.4 M/CU MM    Hemoglobin 6.4 (LL) 12.5 - 16.0 GM/DL    Hematocrit 21.0 (L) 37 - 47 %    MCV 98.1 78 - 100 FL    MCH 29.9 27 - 31 PG    MCHC 30.5 (L) 32.0 - 36.0 %    RDW 12.9 11.7 - 14.9 %    Platelets 565 013 - 521 K/CU MM    MPV 9.5 7.5 - 11.1 FL   Comprehensive metabolic panel   Result Value Ref Range    Sodium 138 135 - 145 MMOL/L    Potassium 3.7 3.5 - 5.1 MMOL/L    Chloride 105 99 - 110 mMol/L    CO2 26 21 - 32 MMOL/L    BUN 6 6 - 23 MG/DL    CREATININE 0.5 (L) 0.6 - 1.1 MG/DL    Glucose 94 70 - 99 MG/DL    Calcium 7.9 (L) 8.3 - 10.6 MG/DL    Albumin 3.0 (L) 3.4 - 5.0 GM/DL    Total Protein 5.0 (L) 6.4 - 8.2 GM/DL    Total Bilirubin 0.2 0.0 - 1.0 MG/DL    ALT 9 (L) 10 - 40 U/L    AST 13 (L) 15 - 37 IU/L    Alkaline Phosphatase 83 40 - 129 IU/L    GFR Non-African American >60 >60 mL/min/1.73m2    GFR African American >60 >60 mL/min/1.73m2    Anion Gap 7 4 - 16   Hemoglobin and hematocrit, blood   Result Value Ref Range    Hemoglobin 7.5 (L) 12.5 - 16.0 GM/DL    Hematocrit 24.0 (L) 37 - 47 %   TYPE AND SCREEN   Result Value Ref Range    ABO/Rh B NEGATIVE     Antibody Screen NEGATIVE     Unit Number [id number]     Component LEUKO-POOR RED CELLS     Unit Divison 00     Status ISSUED,FINAL     Transfusion Status OK TO TRANSFUSE     Crossmatch Result COMPATIBLE       Radiographs (if obtained):  [] The following radiograph was interpreted by myself in the absence of a radiologist:  [x]Radiologist's Report Reviewed:  CT ABDOMEN PELVIS W IV CONTRAST Additional Contrast? None   Final Result   Interval development of mild abdominal and pelvic ascites. Previously seen bowel thickening or dilatation is less notable than prior   study, and may have been a transient finding related to peristalsis. No   evidence of bowel obstruction. There is a circumscribed hypodensity near the iliacus muscle and small bowel   in the left abdomen. There is an adjacent radiopaque density, most likely a   postoperative clip.   Postoperative seroma or lymphocele favored with   infection or cystic malignancy felt less likely, however attention on   follow-up recommended. No significant older studies available for comparison. RECOMMENDATIONS:   Unavailable               EKG (if obtained): (All EKG's are interpreted by myself in the absence of a cardiologist)    MDM:  Differential diagnoses considered include but are not limited to appendicitis, colitis, diverticulitis, intestinal spasm, constipation, urinary tract infection, gastroenteritis. Basic labs are obtained and show a leukocytosis which is new from yesterday, otherwise labs are unremarkable. Normal electrolytes. Normal lipase, do not suspect pancreatitis. Urine does have some bacteria as well as nitrites, possibly due to urinary tract infection. CT scan of patient's abdomen shows interval development of mild abdominal and pelvic ascites. The previously noted bowel thickening and dilation is less notable than previous study. No evidence of bowel obstruction. There is a circumscribed hypodensity near the iliac us muscle and small bowel in the left abdomen. It may be due to a postoperative seroma or lymphocele. Patient is continuing to have fairly significant abdominal pain despite pain medication in the emergency room. Plan for admission for intractable abdominal pain. I did consult general surgery prior to admission. I did consult with Dr. Bertha Bell - on call for Gen Surgery - and discussed patient's history, ED presentation/course including any pertinent laboratory findings and imaging study findings. He elected patient CT scan is concerning for an internal hernia. He plans to take patient to the OR this evening. He will admit the patient to the hospital.    Luma Etienne was sent and is negative. Patient admitted to Dr. Bertha Bell for surgery this evening. Plan of care explained to patient. Concerning signs and symptoms warranting a return visit to the Emergency Department were explained in detail.  All questions and concerns were addressed to the patient's satisfaction. Patient understood and agreed with plan. I did don appropriate PPE (including face mask, protective eye ware/safety glasses and gloves), as recommended by the health facility/national standard best practice, during my bedside interactions with the patient. Clinical Impression:  1. Torsion of right ovary and ovarian pedicle    2. Hemoperitoneum          Isak Hill MD       Please note that portions of this note may have been complete with a voice recognition program.  Effortswere made to edit the dictations, but occasional words are mis-transcribed.           Isak Hill MD  01/30/22 2840

## 2022-01-24 NOTE — CONSULTS
Department of GeneralSurgery   Surgical Service Dr Fredo German   Consult Note    Date of Consult: 22      Reason for Consult:  Abdominal pain  Requesting Physician:  Dr Emily Martinez:  RLQ abd pain    History Obtained From:  patient    HISTORY OF PRESENT ILLNESS:                The patient is a 35 y.o. female who presents with worsening abdominal pain located in the mid to right lower abdomen. Patient was here last night with similar symptoms and seen in the ED for evaluation. Her work-up was \"\" unremarkable however her pain did improve with ED pain medicine. Patient was advised to return for abdominal exam worsen. Patient now is in the ED with similar and worsening pain. Pain is described as cramping, dull and pressure-like. Pain level is 9/10. CT scan of the abdomen pelvis was obtained as well as labs. Her white count has increased to 12,000 patient is slightly tachycardic in the 100s. Ct abd showed       Impression   Interval development of mild abdominal and pelvic ascites.       Previously seen bowel thickening or dilatation is less notable than prior   study, and may have been a transient finding related to peristalsis.  No   evidence of bowel obstruction.       There is a circumscribed hypodensity near the iliacus muscle and small bowel   in the left abdomen.  There is an adjacent radiopaque density, most likely a   postoperative clip.  Postoperative seroma or lymphocele favored with   infection or cystic malignancy felt less likely, however attention on   follow-up recommended.  No significant older studies available for comparison.       RECOMMENDATIONS:   Unavailable                 Past Medical History:    History reviewed. No pertinent past medical history.     Past Surgical History:    Past Surgical History:   Procedure Laterality Date    ABDOMINOPLASTY       SECTION, LOW TRANSVERSE  1997    ENDOMETRIAL ABLATION      GASTRIC BYPASS SURGERY      HYSTERECTOMY      SINUS SURGERY      TONSILLECTOMY         Current Medications:   Current Facility-Administered Medications   Medication Dose Route Frequency Provider Last Rate Last Admin    ondansetron (ZOFRAN) injection 4 mg  4 mg IntraVENous Q30 Min PRN Donna Mcdaniel MD   4 mg at 22 1351    morphine (PF) injection 4 mg  4 mg IntraVENous Q30 Min PRN Donna Mcdaniel MD   4 mg at 22 1522     Current Outpatient Medications   Medication Sig Dispense Refill    ondansetron (ZOFRAN ODT) 4 MG disintegrating tablet Take 1 tablet by mouth every 8 hours as needed for Nausea 15 tablet 0    famotidine (PEPCID) 20 MG tablet Take 1 tablet by mouth 2 times daily 14 tablet 0    dicyclomine (BENTYL) 10 MG capsule Take 1 capsule by mouth 3 times daily As needed for abdominal pain 15 capsule 3    naproxen (NAPROSYN) 500 MG tablet Take 1 tablet by mouth 2 times daily 60 tablet 0    calcium carbonate (ANTACID) 500 MG chewable tablet Take 1 tablet by mouth daily 30 tablet 0    HYDROcodone-acetaminophen (NORCO) 5-325 MG per tablet Take 1-2 tablets by mouth every 6 hours as needed for Pain for up to 2 days.  10 tablet 0    naproxen (NAPROSYN) 500 MG tablet Take 1 tablet by mouth 2 times daily 60 tablet 0       Allergies:  Amoxicillin    Social History:   Social History     Socioeconomic History    Marital status:      Spouse name: None    Number of children: None    Years of education: None    Highest education level: None   Occupational History    None   Tobacco Use    Smoking status: Former Smoker     Types: Cigarettes     Quit date: 2009     Years since quittin.7    Smokeless tobacco: Never Used   Substance and Sexual Activity    Alcohol use: No    Drug use: No    Sexual activity: Yes     Partners: Male   Other Topics Concern    None   Social History Narrative    None     Social Determinants of Health     Financial Resource Strain:     Difficulty of Paying Living Expenses: Not on file   Food Insecurity:     Worried About [facility] in the Last Year: Not on file    Darrian of Food in the Last Year: Not on file   Transportation Needs:     Lack of Transportation (Medical): Not on file    Lack of Transportation (Non-Medical): Not on file   Physical Activity:     Days of Exercise per Week: Not on file    Minutes of Exercise per Session: Not on file   Stress:     Feeling of Stress : Not on file   Social Connections:     Frequency of Communication with Friends and Family: Not on file    Frequency of Social Gatherings with Friends and Family: Not on file    Attends Judaism Services: Not on file    Active Member of [address] [place] [address] or Organizations: Not on file    Attends Club or Organization Meetings: Not on file    Marital Status: Not on file   Intimate Partner Violence:     Fear of Current or Ex-Partner: Not on file    Emotionally Abused: Not on file    Physically Abused: Not on file    Sexually Abused: Not on file   Housing Stability:     Unable to Pay for Housing in the Last Year: Not on file    Number of [place] in the Last Year: Not on file    Unstable Housing in the Last Year: Not on file       Family History:   History reviewed. No pertinent family history. REVIEW OFSYSTEMS:    Review of Systems   Constitutional: Negative for chills and fever. HENT: Negative for ear pain, mouth sores, sore throat and tinnitus. Eyes: Negative for photophobia, redness and itching. Respiratory: Negative for apnea, choking and stridor. Cardiovascular: Negative for chest pain and palpitations. Gastrointestinal: Positive for abdominal pain and nausea. Negative for anal bleeding, constipation and rectal pain. Endocrine: Negative for polydipsia. Genitourinary: Negative for enuresis, flank pain and hematuria. Musculoskeletal: Negative for back pain, joint swelling and myalgias. Skin: Negative for color change and pallor. Allergic/Immunologic: Negative for environmental allergies.    Neurological: Negative for syncope and speech difficulty. Psychiatric/Behavioral: Negative for confusion and hallucinations. PHYSICAL EXAM:  Vitals:    01/24/22 1132 01/24/22 1353 01/24/22 1432   BP: (!) 160/97 (!) 158/97 (!) 152/95   Pulse: 66 81    Resp: 16     Temp: 98 °F (36.7 °C)     TempSrc: Oral     SpO2: 99% 99% 98%   Weight: 150 lb (68 kg)     Height: 5' 3\" (1.6 m)         Physical Exam  Constitutional:       Appearance: She is well-developed. HENT:      Head: Normocephalic. Eyes:      Pupils: Pupils are equal, round, and reactive to light. Cardiovascular:      Rate and Rhythm: Normal rate. Pulmonary:      Effort: Pulmonary effort is normal.   Abdominal:      General: There is distension. Palpations: Abdomen is soft. There is no mass. Tenderness: There is abdominal tenderness. There is no guarding or rebound. Musculoskeletal:         General: Normal range of motion. Cervical back: Normal range of motion and neck supple. Skin:     General: Skin is warm. Neurological:      Mental Status: She is alert and oriented to person, place, and time.            DATA:    CBC with Differential:    Lab Results   Component Value Date    WBC 12.3 01/24/2022    RBC 3.64 01/24/2022    HGB 10.9 01/24/2022    HCT 34.3 01/24/2022     01/24/2022    MCV 94.2 01/24/2022    MCH 29.9 01/24/2022    MCHC 31.8 01/24/2022    RDW 12.7 01/24/2022    SEGSPCT 83.9 01/24/2022    LYMPHOPCT 10.3 01/24/2022    MONOPCT 4.6 01/24/2022    BASOPCT 0.3 01/24/2022    MONOSABS 0.6 01/24/2022    LYMPHSABS 1.3 01/24/2022    EOSABS 0.1 01/24/2022    BASOSABS 0.0 01/24/2022    DIFFTYPE AUTOMATED DIFFERENTIAL 01/24/2022     CMP:    Lab Results   Component Value Date     01/24/2022    K 3.9 01/24/2022     01/24/2022    CO2 25 01/24/2022    BUN 9 01/24/2022    CREATININE 0.6 01/24/2022    GFRAA >60 01/24/2022    LABGLOM >60 01/24/2022    GLUCOSE 115 01/24/2022    PROT 6.7 01/24/2022    LABALBU 3.9 01/24/2022    CALCIUM 8.3 2022    BILITOT 0.4 2022    ALKPHOS 119 2022    AST 34 2022    ALT 19 2022       IMPRESSION:        Patient Active Problem List:     Gastroenteritis     Encounter for supervision of normal pregnancy in multigravida     Status post repeat low transverse  section     Ventral hernia     S/P repair of ventral hernia     Abdominal wall mass of epigastric region      Post gastric bypass internal hernia    PLAN:    Will admit for iv fluids, pain control and proceed with exp lap emergently. I suspect pt has an internal hernia with possible ischemia.         Maria Victoria Saenz MD

## 2022-01-24 NOTE — ANESTHESIA POSTPROCEDURE EVALUATION
Department of Anesthesiology  Postprocedure Note    Patient: Ray Mcfarland  MRN: [id number]  [place]: 1980  Date of evaluation: 1/24/2022  Time:  6:42 PM     Procedure Summary     Date: 01/24/22 Room / Location: [address]    Anesthesia Start: 3626 Anesthesia Stop: 8029    Procedure: LAPAROTOMY EXPLORATORY APPENDECTOMY RIGHT OOPHERECTOMY FOR OVARIAN TORSION (N/A Abdomen) Diagnosis: (ABDOMINAL PAIN)    Surgeons: Jorge Luis Painter MD Responsible Provider: Jaimie Fox MD    Anesthesia Type: general ASA Status: 3 - Emergent          Anesthesia Type: general    Griffin Phase I: Griffin Score: 7    Griffin Phase II:      Last vitals: Reviewed and per EMR flowsheets.        Anesthesia Post Evaluation    Patient location during evaluation: PACU  Patient participation: complete - patient participated  Level of consciousness: awake and alert  Pain score: 0  Airway patency: patent  Nausea & Vomiting: no vomiting and no nausea  Complications: no  Cardiovascular status: blood pressure returned to baseline and hemodynamically stable  Respiratory status: acceptable, spontaneous ventilation, nonlabored ventilation and nasal cannula  Hydration status: stable

## 2022-01-24 NOTE — BRIEF OP NOTE
Brief Postoperative Note      Patient: Katelyn Watson  YOB: 1980  MRN: [id number]    Date of Procedure: 1/24/2022    Pre-Op Diagnosis: ABDOMINAL PAIN    Post-Op Diagnosis:  Appendiceal mass, Right ovarian torsion and necrosis.  Hemoperitineum       Procedure(s):  LAPAROTOMY EXPLORATORY APPENDECTOMY RIGHT OOPHERECTOMY FOR OVARIAN TORSION    Surgeon(s):  Nils Asencio MD    Assistant:  Physician Assistant: Estela Patel PA-C    Anesthesia: General    Estimated Blood Loss (mL): less than 50     Complications: None    Specimens:   ID Type Source Tests Collected by Time Destination   A : appendix Tissue Appendix SURGICAL PATHOLOGY Nils Asencio MD 1/24/2022 1743    B : RIGHT OVARY AND FALLOPIAN TUBE Tissue Ovary SURGICAL PATHOLOGY Nils Asencio MD 1/24/2022 1752        Implants:  * No implants in log *      Drains:   Urethral Catheter Double-lumen 16 fr (Active)       Findings: As Above    Electronically signed by Estela Patel PA-C on 1/24/2022 at 6:23 PM

## 2022-01-24 NOTE — ANESTHESIA PRE PROCEDURE
Department of Anesthesiology  Preprocedure Note       Name:  En Simms   Age:  35 y.o.  :  1980                                          MRN:  [id number]         Date:  2022      Surgeon: Ayaka Florence):  Alicia Colón MD    Procedure: Procedure(s):  LAPAROTOMY EXPLORATORY POSS BOWEL RESECTION    Medications prior to admission:   Prior to Admission medications    Medication Sig Start Date End Date Taking? Authorizing Provider   ondansetron (ZOFRAN ODT) 4 MG disintegrating tablet Take 1 tablet by mouth every 8 hours as needed for Nausea 22   Amanda Shell, DO   famotidine (PEPCID) 20 MG tablet Take 1 tablet by mouth 2 times daily 22   Amanda Shell, DO   dicyclomine (BENTYL) 10 MG capsule Take 1 capsule by mouth 3 times daily As needed for abdominal pain 22   Amanda Shell, DO   naproxen (NAPROSYN) 500 MG tablet Take 1 tablet by mouth 2 times daily 22   Amanda Shell, DO   calcium carbonate (ANTACID) 500 MG chewable tablet Take 1 tablet by mouth daily 22  Amanda Shell, DO   HYDROcodone-acetaminophen (NORCO) 5-325 MG per tablet Take 1-2 tablets by mouth every 6 hours as needed for Pain for up to 2 days. 22  Amanda Shell, DO   naproxen (NAPROSYN) 500 MG tablet Take 1 tablet by mouth 2 times daily 18   Jv Palacios PA-C       Current medications:    Current Facility-Administered Medications   Medication Dose Route Frequency Provider Last Rate Last Admin    ondansetron (ZOFRAN) injection 4 mg  4 mg IntraVENous Q30 Min PRN Armando Proctor MD   4 mg at 22 1351    morphine (PF) injection 4 mg  4 mg IntraVENous Q30 Min PRN Armando Proctor MD   4 mg at 22 1522    ceFAZolin (ANCEF) 2000 mg in dextrose 3 % 50 mL IVPB (duplex)  2,000 mg IntraVENous Once Alicia Colón MD           Allergies:     Allergies   Allergen Reactions    Amoxicillin Hives       Problem List:    Patient Active Problem List   Diagnosis Code    Gastroenteritis K52.9    Encounter for supervision of normal pregnancy in multigravida Z34.80    Status post repeat low transverse  section Z98.891    Ventral hernia K43.9    S/P repair of ventral hernia Z98.890, Z87.19    Abdominal wall mass of epigastric region R19.06       Past Medical History:  History reviewed. No pertinent past medical history. Past Surgical History:        Procedure Laterality Date    ABDOMINOPLASTY       SECTION, LOW TRANSVERSE  1997    ENDOMETRIAL ABLATION      GASTRIC BYPASS SURGERY      HYSTERECTOMY      SINUS SURGERY      TONSILLECTOMY         Social History:    Social History     Tobacco Use    Smoking status: Former Smoker     Types: Cigarettes     Quit date: 2009     Years since quittin.7    Smokeless tobacco: Never Used   Substance Use Topics    Alcohol use: No                                Counseling given: Not Answered      Vital Signs (Current):   Vitals:    22 1132 22 1353 22 1432   BP: (!) 160/97 (!) 158/97 (!) 152/95   Pulse: 66 81    Resp: 16     Temp: 98 °F (36.7 °C)     TempSrc: Oral     SpO2: 99% 99% 98%   Weight: 150 lb (68 kg)     Height: 5' 3\" (1.6 m)                                                BP Readings from Last 3 Encounters:   22 (!) 152/95   22 (!) 169/109   18 125/79       NPO Status:                                                                                 BMI:   Wt Readings from Last 3 Encounters:   22 150 lb (68 kg)   18 145 lb (65.8 kg)   07/23/15 298 lb (135.2 kg)     Body mass index is 26.57 kg/m².     CBC:   Lab Results   Component Value Date    WBC 12.3 2022    RBC 3.64 2022    HGB 10.9 2022    HCT 34.3 2022    MCV 94.2 2022    RDW 12.7 2022     2022       CMP:   Lab Results   Component Value Date     2022    K 3.9 2022     2022    CO2 25 2022    BUN 9 2022 CREATININE 0.6 01/24/2022    GFRAA >60 01/24/2022    LABGLOM >60 01/24/2022    GLUCOSE 115 01/24/2022    PROT 6.7 01/24/2022    CALCIUM 8.3 01/24/2022    BILITOT 0.4 01/24/2022    ALKPHOS 119 01/24/2022    AST 34 01/24/2022    ALT 19 01/24/2022       POC Tests: No results for input(s): POCGLU, POCNA, POCK, POCCL, POCBUN, POCHEMO, POCHCT in the last 72 hours. Coags: No results found for: PROTIME, INR, APTT    HCG (If Applicable):   Lab Results   Component Value Date    PREGTESTUR neg 11/13/2012    PREGTESTUR NEGATIVE 11/13/2012        ABGs: No results found for: PHART, PO2ART, AVQ1OAU, SGS0EJS, BEART, G7FBZWWQ     Type & Screen (If Applicable):  No results found for: LABABO, LABRH    Drug/Infectious Status (If Applicable):  No results found for: HIV, HEPCAB    COVID-19 Screening (If Applicable): No results found for: COVID19        Anesthesia Evaluation  Patient summary reviewed  Airway: Mallampati: Unable to assess / NA        Dental:          Pulmonary: breath sounds clear to auscultation                             Cardiovascular:  Exercise tolerance: good (>4 METS),           Rhythm: regular                      Neuro/Psych:               GI/Hepatic/Renal:   (+) hiatal hernia, morbid obesity         ROS comment: S/p GBP    [place]. Endo/Other:                     Abdominal:   (+) obese,           Vascular: Other Findings:             Anesthesia Plan      general     ASA 3 - emergent       Induction: intravenous. MIPS: Postoperative opioids intended. Anesthetic plan and risks discussed with patient. Plan discussed with CRNA.     Attending anesthesiologist reviewed and agrees with Gia Gomez MD   1/24/2022

## 2022-01-24 NOTE — ED PROVIDER NOTES
As provider-in-triage, I performed a medical screening history and physical exam on this patient. HISTORY OF PRESENT ILLNESS  Kevyn Burdick is 35 y.o. female presents with persisting and worsening abdominal pain. She does mention she was evaluated here last night in the emergency department. She states she came in after acute onset of abdominal pain that occurred just prior to arrival yesterday. She describes having a work-up and evaluation is unremarkable, but was advised to return in 12 hours for repeat abdominal exam.  She states the pain had worsened and he started to spread, radiating her back, which prompted her visit to the emergency department. As it is accompanied with some nausea. She states minimal relief after taking Norco and Zofran has not tried the other medications. She denies any vomiting, fever, chest pain, shortness of breath, urinary symptoms. PHYSICAL EXAM  BP (!) 160/97   Pulse 66   Temp 98 °F (36.7 °C) (Oral)   Resp 16   Ht 5' 3\" (1.6 m)   Wt 150 lb (68 kg)   LMP 10/02/2012   SpO2 99%   BMI 26.57 kg/m²     On exam, the patient appears well-hydrated, well-nourished, and in no acute distress. Mucous membranes are moist. Speech is clear. Breathing is unlabored. Skin is dry. Mental status is normal. Moves all extremities, and is without facial droop. Comment: Please note this report has been produced using speech recognition software and may contain errors related to that system including errors in grammar, punctuation, and spelling, as well as words and phrases that may be inappropriate. If there are any questions or concerns please feel free to contact the dictating provider for clarification.         Rowena Joya PA-C  01/24/22 1220

## 2022-01-24 NOTE — PROGRESS NOTES
1837 Patient arrived to PACU from OR. Monitors applied and alarms on. No drainage from site. Report from [employer]. 1848 Patient tolerating ice chips. 1850 Patient turned side to side and repositioned in bed. 1916 PACU care completed. Patient in holding while waiting on room to become available.

## 2022-01-24 NOTE — ED PROVIDER NOTES
[address] [facility]      TRIAGE CHIEF COMPLAINT:   Abdominal Pain (right lower sided; started an hour ago; contsant sharp) and Nausea      [place]:  Katelyn Watson is a 35 y.o. female that presents with complaint of right lower quadrant abdominal pain nausea. Patient states she was at home about an hour ago had sudden onset of right lower quadrant pain nausea. Pain constant 20-10, sharp pain. Does not radiate anywhere else. Denies any fever chest pain shortness of breath urine or bowel complaints. She has had a partial hysterectomy, she still has her ovaries still has her appendix has not had a kidney stone. Denies other questions or concerns. REVIEW OF SYSTEMS:  At least 10 systems reviewed and otherwise acutely negative except as in the [address] Review of Systems   Constitutional: Negative. HENT: Negative. Eyes: Negative. Respiratory: Negative. Cardiovascular: Negative. Gastrointestinal: Positive for abdominal pain and nausea. Endocrine: Negative. Genitourinary: Negative. Musculoskeletal: Negative. Skin: Negative. Allergic/Immunologic: Negative. Neurological: Negative. Hematological: Negative. Psychiatric/Behavioral: Negative. All other systems reviewed and are negative. History reviewed. No pertinent past medical history. Past Surgical History:   Procedure Laterality Date    ABDOMINOPLASTY       SECTION, LOW TRANSVERSE  1997    ENDOMETRIAL ABLATION      GASTRIC BYPASS SURGERY      HYSTERECTOMY      SINUS SURGERY      TONSILLECTOMY       History reviewed. No pertinent family history.   Social History     Socioeconomic History    Marital status:      Spouse name: Not on file    Number of children: Not on file    Years of education: Not on file    Highest education level: Not on file   Occupational History    Not on file   Tobacco Use    Smoking status: Former Smoker     Types: Cigarettes     Quit date: 2009 Years since quittin.7    Smokeless tobacco: Never Used   Substance and Sexual Activity    Alcohol use: No    Drug use: No    Sexual activity: Yes     Partners: Male   Other Topics Concern    Not on file   Social History Narrative    Not on file     Social Determinants of Health     Financial Resource Strain:     Difficulty of Paying Living Expenses: Not on file   Food Insecurity:     Worried About Running Out of Food in the Last Year: Not on file    Darrian of Food in the Last Year: Not on file   Transportation Needs:     Lack of Transportation (Medical): Not on file    Lack of Transportation (Non-Medical):  Not on file   Physical Activity:     Days of Exercise per Week: Not on file    Minutes of Exercise per Session: Not on file   Stress:     Feeling of Stress : Not on file   Social Connections:     Frequency of Communication with Friends and Family: Not on file    Frequency of Social Gatherings with Friends and Family: Not on file    Attends Buddhist Services: Not on file    Active Member of [address] [place] [address] or Organizations: Not on file    Attends Club or Organization Meetings: Not on file    Marital Status: Not on file   Intimate Partner Violence:     Fear of Current or Ex-Partner: Not on file    Emotionally Abused: Not on file    Physically Abused: Not on file    Sexually Abused: Not on file   Housing Stability:     Unable to Pay for Housing in the Last Year: Not on file    Number of [place] in the Last Year: Not on file    Unstable Housing in the Last Year: Not on file     Current Facility-Administered Medications   Medication Dose Route Frequency Provider Last Rate Last Admin    morphine (PF) injection 4 mg  4 mg IntraVENous Q30 Min PRN Mayela Asif DO   4 mg at 22 0401     Current Outpatient Medications   Medication Sig Dispense Refill    ondansetron (ZOFRAN ODT) 4 MG disintegrating tablet Take 1 tablet by mouth every 8 hours as needed for Nausea 15 tablet 0    famotidine (PEPCID) 20 MG tablet Take 1 tablet by mouth 2 times daily 14 tablet 0    dicyclomine (BENTYL) 10 MG capsule Take 1 capsule by mouth 3 times daily As needed for abdominal pain 15 capsule 3    naproxen (NAPROSYN) 500 MG tablet Take 1 tablet by mouth 2 times daily 60 tablet 0    calcium carbonate (ANTACID) 500 MG chewable tablet Take 1 tablet by mouth daily 30 tablet 0    HYDROcodone-acetaminophen (NORCO) 5-325 MG per tablet Take 1-2 tablets by mouth every 6 hours as needed for Pain for up to 2 days. 10 tablet 0    naproxen (NAPROSYN) 500 MG tablet Take 1 tablet by mouth 2 times daily 60 tablet 0      Allergies   Allergen Reactions    Amoxicillin Hives     Current Facility-Administered Medications   Medication Dose Route Frequency Provider Last Rate Last Admin    morphine (PF) injection 4 mg  4 mg IntraVENous Q30 Min PRN Mayela Asif DO   4 mg at 01/24/22 0401     Current Outpatient Medications   Medication Sig Dispense Refill    ondansetron (ZOFRAN ODT) 4 MG disintegrating tablet Take 1 tablet by mouth every 8 hours as needed for Nausea 15 tablet 0    famotidine (PEPCID) 20 MG tablet Take 1 tablet by mouth 2 times daily 14 tablet 0    dicyclomine (BENTYL) 10 MG capsule Take 1 capsule by mouth 3 times daily As needed for abdominal pain 15 capsule 3    naproxen (NAPROSYN) 500 MG tablet Take 1 tablet by mouth 2 times daily 60 tablet 0    calcium carbonate (ANTACID) 500 MG chewable tablet Take 1 tablet by mouth daily 30 tablet 0    HYDROcodone-acetaminophen (NORCO) 5-325 MG per tablet Take 1-2 tablets by mouth every 6 hours as needed for Pain for up to 2 days.  10 tablet 0    naproxen (NAPROSYN) 500 MG tablet Take 1 tablet by mouth 2 times daily 60 tablet 0       Nursing Notes Reviewed    VITAL SIGNS:  ED Triage Vitals   Enc Vitals Group      BP       Pulse       Resp       Temp       Temp src       SpO2       Weight       Height       Head Circumference       Peak Flow       Pain Score Pain Loc       Pain Edu? Excl. in [address]? PHYSICAL EXAM:  Physical Exam  Vitals and nursing note reviewed. Constitutional:       General: She is not in acute distress. Appearance: She is well-developed and well-groomed. She is ill-appearing. She is not toxic-appearing or diaphoretic. HENT:      Head: Normocephalic. Right Ear: External ear normal.      Left Ear: External ear normal.   Eyes:      General: No scleral icterus. Right eye: No discharge. Left eye: No discharge. Extraocular Movements: Extraocular movements intact. Conjunctiva/sclera: Conjunctivae normal.   Cardiovascular:      Rate and Rhythm: Regular rhythm. Tachycardia present. Pulses: Normal pulses. Heart sounds: Normal heart sounds. No murmur heard. No friction rub. No gallop. Pulmonary:      Effort: Pulmonary effort is normal. No respiratory distress. Breath sounds: Normal breath sounds. No stridor. No wheezing, rhonchi or rales. Abdominal:      General: Bowel sounds are normal. There is no distension. There are no signs of injury. Palpations: Abdomen is soft. There is no mass or pulsatile mass. Tenderness: There is abdominal tenderness in the right lower quadrant and suprapubic area. There is no guarding or rebound. Positive signs include McBurney's sign. Negative signs include Goddard's sign. Hernia: No hernia is present. Musculoskeletal:         General: No swelling, tenderness, deformity or signs of injury. Normal range of motion. Cervical back: Normal range of motion. No rigidity. Right lower leg: No edema. Left lower leg: No edema. Skin:     General: Skin is warm. Coloration: Skin is not jaundiced or pale. Findings: No bruising, erythema, lesion or rash. Neurological:      General: No focal deficit present. Mental Status: She is alert and oriented to person, place, and time. GCS: GCS eye subscore is 4.  GCS verbal subscore is 5. GCS motor subscore is 6. Cranial Nerves: Cranial nerves are intact. No cranial nerve deficit, dysarthria or facial asymmetry. Sensory: No sensory deficit. Motor: No weakness, tremor, atrophy, abnormal muscle tone or seizure activity. Coordination: Coordination normal.   Psychiatric:         Mood and Affect: Mood normal.         Behavior: Behavior normal. Behavior is cooperative. Thought Content:  Thought content normal.         Judgment: Judgment normal.           I have reviewed andinterpreted all of the currently available lab results from this visit (if applicable):    Results for orders placed or performed during the hospital encounter of 01/23/22   CBC Auto Differential   Result Value Ref Range    WBC 7.2 4.0 - 10.5 K/CU MM    RBC 4.19 (L) 4.2 - 5.4 M/CU MM    Hemoglobin 12.4 (L) 12.5 - 16.0 GM/DL    Hematocrit 40.0 37 - 47 %    MCV 95.5 78 - 100 FL    MCH 29.6 27 - 31 PG    MCHC 31.0 (L) 32.0 - 36.0 %    RDW 12.7 11.7 - 14.9 %    Platelets 405 282 - 281 K/CU MM    MPV 9.8 7.5 - 11.1 FL    Differential Type AUTOMATED DIFFERENTIAL     Segs Relative 66.9 (H) 36 - 66 %    Lymphocytes % 24.3 24 - 44 %    Monocytes % 5.0 (H) 0 - 4 %    Eosinophils % 2.8 0 - 3 %    Basophils % 0.7 0 - 1 %    Segs Absolute 4.8 K/CU MM    Lymphocytes Absolute 1.7 K/CU MM    Monocytes Absolute 0.4 K/CU MM    Eosinophils Absolute 0.2 K/CU MM    Basophils Absolute 0.1 K/CU MM    Nucleated RBC % 0.0 %    Total Nucleated RBC 0.0 K/CU MM    Total Immature Neutrophil 0.02 K/CU MM    Immature Neutrophil % 0.3 0 - 0.43 %   CMP   Result Value Ref Range    Sodium 137 135 - 145 MMOL/L    Potassium 4.0 3.5 - 5.1 MMOL/L    Chloride 104 99 - 110 mMol/L    CO2 22 21 - 32 MMOL/L    BUN 10 6 - 23 MG/DL    CREATININE 0.7 0.6 - 1.1 MG/DL    Glucose 93 70 - 99 MG/DL    Calcium 8.3 8.3 - 10.6 MG/DL    Albumin 3.9 3.4 - 5.0 GM/DL    Total Protein 6.9 6.4 - 8.2 GM/DL    Total Bilirubin 0.1 0.0 - 1.0 MG/DL    ALT 10 10 - 40 U/L AST 14 (L) 15 - 37 IU/L    Alkaline Phosphatase 101 40 - 129 IU/L    GFR Non-African American >60 >60 mL/min/1.73m2    GFR African American >60 >60 mL/min/1.73m2    Anion Gap 11 4 - 16   Lipase   Result Value Ref Range    Lipase 27 13 - 60 IU/L   Urinalysis   Result Value Ref Range    Color, UA STRAW (A) YELLOW    Clarity, UA CLEAR CLEAR    Glucose, Urine NEGATIVE NEGATIVE MG/DL    Bilirubin Urine NEGATIVE NEGATIVE MG/DL    Ketones, Urine NEGATIVE NEGATIVE MG/DL    Specific Gravity, UA 1.010 1.001 - 1.035    Blood, Urine NEGATIVE NEGATIVE    pH, Urine 6.0 5.0 - 8.0    Protein, UA NEGATIVE NEGATIVE MG/DL    Urobilinogen, Urine NEGATIVE 0.2 - 1.0 MG/DL    Nitrite Urine, Quantitative NEGATIVE NEGATIVE    Leukocyte Esterase, Urine NEGATIVE NEGATIVE    RBC, UA NONE SEEN 0 - 6 /HPF    WBC, UA NONE SEEN 0 - 5 /HPF    Bacteria, UA NEGATIVE NEGATIVE /HPF    Squam Epithel, UA 1 /HPF    Trichomonas, UA NONE SEEN NONE SEEN /HPF   Lactic Acid, Plasma   Result Value Ref Range    Lactate 1.2 0.4 - 2.0 mMOL/L        Radiographs (if obtained):  [] The following radiograph was interpreted by myself in the absence of a radiologist:  [x] Radiologist's Report Reviewed:    CT Abd/pelv    EKG (if obtained): (All EKG's are interpreted by myself in the absence of a cardiologist)    MDM:    Patient here with sudden onset of right lower quadrant abdominal pain about an hour ago. She has not had this before. She has had a partial hysterectomy, still has her ovaries, has not had a kidney stone or her appendix taken out. Again patient states pain started 1 hour ago. No fever she has nausea no vomiting or chest pain or shortness of breath no other bowel or bladder symptoms. On exam I saw her in triage she is tachycardic mildly she does have focal right lower quadrant pain is no hernia there is no pulsatile mass. We will get labs, imaging main concern at this time is for kidney stone, appendix.   May need an ultrasound to look at her ovaries. Patient recheck doing well texting on phone vital signs are stable. Imaging performed labs urine, ultrasound pelvis, CT and pelvis. So far work-up is negative due to her bypass she does have some abnormal positioning of her intestines however no obvious obstruction or perforation no appendicitis no cholecystitis no kidney stone no obstruction. May have an ileus lactate is normal white counts normal no fever. Patient discharged home given return precautions and follow-up information. She was given 12 to 24-hour return precautions and follow-up with patient. Will discharge patient home with pain nausea medicine. CLINICAL IMPRESSION:  Final diagnoses:   Abdominal pain, unspecified abdominal location   Nausea       (Please note that portions of this note may have been completed with a voice recognition program. Efforts were made to edit the dictations but occasionally words aremis-transcribed.)    DISPOSITION REFERRAL (if applicable):  Karolina Dior MD  [address] [place] [address]  [phone]    Schedule an appointment as soon as possible for a visit in 1 day      [facility] Emergency Department  [facility] [phone]    If symptoms worsen    [facility] Emergency Department  [facility] [phone]  In 1 day  12-24 hours for recheck      DISPOSITION MEDICATIONS (if applicable):  New Prescriptions    CALCIUM CARBONATE (ANTACID) 500 MG CHEWABLE TABLET    Take 1 tablet by mouth daily    DICYCLOMINE (BENTYL) 10 MG CAPSULE    Take 1 capsule by mouth 3 times daily As needed for abdominal pain    FAMOTIDINE (PEPCID) 20 MG TABLET    Take 1 tablet by mouth 2 times daily    HYDROCODONE-ACETAMINOPHEN (NORCO) 5-325 MG PER TABLET    Take 1-2 tablets by mouth every 6 hours as needed for Pain for up to 2 days.     NAPROXEN (NAPROSYN) 500 MG TABLET    Take 1 tablet by mouth 2 times daily    ONDANSETRON (ZOFRAN ODT) 4 MG DISINTEGRATING TABLET    Take 1 tablet by mouth every 8 hours as needed for Nausea          DO Kings De Leon DO  01/24/22 1239

## 2022-01-25 LAB
ALBUMIN SERPL-MCNC: 2.9 GM/DL (ref 3.4–5)
ALP BLD-CCNC: 93 IU/L (ref 40–129)
ALT SERPL-CCNC: 11 U/L (ref 10–40)
ANION GAP SERPL CALCULATED.3IONS-SCNC: 11 MMOL/L (ref 4–16)
AST SERPL-CCNC: 15 IU/L (ref 15–37)
BASOPHILS ABSOLUTE: 0 K/CU MM
BASOPHILS RELATIVE PERCENT: 0.3 % (ref 0–1)
BILIRUB SERPL-MCNC: 0.3 MG/DL (ref 0–1)
BUN BLDV-MCNC: 9 MG/DL (ref 6–23)
CALCIUM SERPL-MCNC: 7.9 MG/DL (ref 8.3–10.6)
CHLORIDE BLD-SCNC: 105 MMOL/L (ref 99–110)
CO2: 23 MMOL/L (ref 21–32)
CREAT SERPL-MCNC: 0.7 MG/DL (ref 0.6–1.1)
DIFFERENTIAL TYPE: ABNORMAL
EOSINOPHILS ABSOLUTE: 0 K/CU MM
EOSINOPHILS RELATIVE PERCENT: 0 % (ref 0–3)
GFR AFRICAN AMERICAN: >60 ML/MIN/1.73M2
GFR NON-AFRICAN AMERICAN: >60 ML/MIN/1.73M2
GLUCOSE BLD-MCNC: 126 MG/DL (ref 70–99)
HCT VFR BLD CALC: 25.9 % (ref 37–47)
HEMOGLOBIN: 7.9 GM/DL (ref 12.5–16)
IMMATURE NEUTROPHIL %: 0.3 % (ref 0–0.43)
LYMPHOCYTES ABSOLUTE: 0.7 K/CU MM
LYMPHOCYTES RELATIVE PERCENT: 5.6 % (ref 24–44)
MCH RBC QN AUTO: 29.6 PG (ref 27–31)
MCHC RBC AUTO-ENTMCNC: 30.5 % (ref 32–36)
MCV RBC AUTO: 97 FL (ref 78–100)
MONOCYTES ABSOLUTE: 0.7 K/CU MM
MONOCYTES RELATIVE PERCENT: 5.9 % (ref 0–4)
NUCLEATED RBC %: 0 %
PDW BLD-RTO: 13.3 % (ref 11.7–14.9)
PLATELET # BLD: 251 K/CU MM (ref 140–440)
PMV BLD AUTO: 10.6 FL (ref 7.5–11.1)
POTASSIUM SERPL-SCNC: 4.6 MMOL/L (ref 3.5–5.1)
RBC # BLD: 2.67 M/CU MM (ref 4.2–5.4)
SARS-COV-2, NAAT: NOT DETECTED
SEGMENTED NEUTROPHILS ABSOLUTE COUNT: 10.1 K/CU MM
SEGMENTED NEUTROPHILS RELATIVE PERCENT: 87.9 % (ref 36–66)
SODIUM BLD-SCNC: 139 MMOL/L (ref 135–145)
SOURCE: NORMAL
TOTAL IMMATURE NEUTOROPHIL: 0.03 K/CU MM
TOTAL NUCLEATED RBC: 0 K/CU MM
TOTAL PROTEIN: 5 GM/DL (ref 6.4–8.2)
WBC # BLD: 11.5 K/CU MM (ref 4–10.5)

## 2022-01-25 PROCEDURE — APPNB45 APP NON BILLABLE 31-45 MINUTES: Performed by: PHYSICIAN ASSISTANT

## 2022-01-25 PROCEDURE — 6360000002 HC RX W HCPCS: Performed by: PHYSICIAN ASSISTANT

## 2022-01-25 PROCEDURE — 99024 POSTOP FOLLOW-UP VISIT: CPT | Performed by: PHYSICIAN ASSISTANT

## 2022-01-25 PROCEDURE — 2580000003 HC RX 258: Performed by: PHYSICIAN ASSISTANT

## 2022-01-25 PROCEDURE — 80053 COMPREHEN METABOLIC PANEL: CPT

## 2022-01-25 PROCEDURE — 87635 SARS-COV-2 COVID-19 AMP PRB: CPT

## 2022-01-25 PROCEDURE — 6370000000 HC RX 637 (ALT 250 FOR IP): Performed by: PHYSICIAN ASSISTANT

## 2022-01-25 PROCEDURE — 36415 COLL VENOUS BLD VENIPUNCTURE: CPT

## 2022-01-25 PROCEDURE — 1200000000 HC SEMI PRIVATE

## 2022-01-25 PROCEDURE — 94761 N-INVAS EAR/PLS OXIMETRY MLT: CPT

## 2022-01-25 PROCEDURE — 85025 COMPLETE CBC W/AUTO DIFF WBC: CPT

## 2022-01-25 RX ORDER — MORPHINE SULFATE 4 MG/ML
2 INJECTION, SOLUTION INTRAMUSCULAR; INTRAVENOUS
Status: DISCONTINUED | OUTPATIENT
Start: 2022-01-25 | End: 2022-01-27 | Stop reason: HOSPADM

## 2022-01-25 RX ORDER — CYCLOBENZAPRINE HCL 10 MG
10 TABLET ORAL 3 TIMES DAILY PRN
Status: DISCONTINUED | OUTPATIENT
Start: 2022-01-25 | End: 2022-01-27 | Stop reason: HOSPADM

## 2022-01-25 RX ADMIN — SODIUM CHLORIDE, PRESERVATIVE FREE 10 ML: 5 INJECTION INTRAVENOUS at 10:26

## 2022-01-25 RX ADMIN — MORPHINE SULFATE 2 MG: 2 INJECTION, SOLUTION INTRAMUSCULAR; INTRAVENOUS at 10:26

## 2022-01-25 RX ADMIN — ENOXAPARIN SODIUM 40 MG: 100 INJECTION SUBCUTANEOUS at 10:27

## 2022-01-25 RX ADMIN — OXYCODONE HYDROCHLORIDE 5 MG: 5 TABLET ORAL at 14:14

## 2022-01-25 RX ADMIN — MORPHINE SULFATE 2 MG: 2 INJECTION, SOLUTION INTRAMUSCULAR; INTRAVENOUS at 03:19

## 2022-01-25 RX ADMIN — OXYCODONE HYDROCHLORIDE 5 MG: 5 TABLET ORAL at 08:05

## 2022-01-25 RX ADMIN — SODIUM CHLORIDE: 9 INJECTION, SOLUTION INTRAVENOUS at 21:49

## 2022-01-25 RX ADMIN — SODIUM CHLORIDE: 9 INJECTION, SOLUTION INTRAVENOUS at 08:07

## 2022-01-25 RX ADMIN — MORPHINE SULFATE 2 MG: 2 INJECTION, SOLUTION INTRAMUSCULAR; INTRAVENOUS at 17:41

## 2022-01-25 RX ADMIN — MORPHINE SULFATE 2 MG: 4 INJECTION INTRAVENOUS at 21:18

## 2022-01-25 RX ADMIN — ONDANSETRON 4 MG: 2 INJECTION INTRAMUSCULAR; INTRAVENOUS at 08:04

## 2022-01-25 ASSESSMENT — ENCOUNTER SYMPTOMS
PHOTOPHOBIA: 0
RECTAL PAIN: 0
BACK PAIN: 0
APNEA: 0
ABDOMINAL PAIN: 1
CONSTIPATION: 0
NAUSEA: 0
VOMITING: 0
CHOKING: 0
EYE ITCHING: 0
ANAL BLEEDING: 0
SORE THROAT: 0
COLOR CHANGE: 0
EYE REDNESS: 0
STRIDOR: 0

## 2022-01-25 ASSESSMENT — PAIN SCALES - GENERAL
PAINLEVEL_OUTOF10: 3
PAINLEVEL_OUTOF10: 8
PAINLEVEL_OUTOF10: 8
PAINLEVEL_OUTOF10: 4
PAINLEVEL_OUTOF10: 6
PAINLEVEL_OUTOF10: 3
PAINLEVEL_OUTOF10: 7

## 2022-01-25 ASSESSMENT — PAIN - FUNCTIONAL ASSESSMENT
PAIN_FUNCTIONAL_ASSESSMENT: PREVENTS OR INTERFERES SOME ACTIVE ACTIVITIES AND ADLS
PAIN_FUNCTIONAL_ASSESSMENT: PREVENTS OR INTERFERES SOME ACTIVE ACTIVITIES AND ADLS

## 2022-01-25 ASSESSMENT — PAIN DESCRIPTION - ORIENTATION
ORIENTATION: LOWER;MID
ORIENTATION: MID;LOWER
ORIENTATION: LOWER;MID

## 2022-01-25 ASSESSMENT — PAIN DESCRIPTION - PAIN TYPE
TYPE: SURGICAL PAIN
TYPE: SURGICAL PAIN
TYPE: ACUTE PAIN
TYPE: SURGICAL PAIN

## 2022-01-25 ASSESSMENT — PAIN DESCRIPTION - DESCRIPTORS
DESCRIPTORS: ACHING
DESCRIPTORS: ACHING;DISCOMFORT
DESCRIPTORS: ACHING;DISCOMFORT

## 2022-01-25 ASSESSMENT — PAIN DESCRIPTION - LOCATION
LOCATION: ABDOMEN;HEAD
LOCATION: ABDOMEN

## 2022-01-25 ASSESSMENT — PAIN DESCRIPTION - PROGRESSION: CLINICAL_PROGRESSION: NOT CHANGED

## 2022-01-25 ASSESSMENT — PAIN DESCRIPTION - ONSET
ONSET: ON-GOING
ONSET: ON-GOING

## 2022-01-25 ASSESSMENT — PAIN DESCRIPTION - FREQUENCY
FREQUENCY: INTERMITTENT

## 2022-01-25 NOTE — PROGRESS NOTES
GENERAL SURGERY PROGRESS NOTE    Rosalind Mooney is a 35 y.o. female with 1 Day Post-Op Exploratory laparotomy, right oophorectomy, appendectomy and abd wash out. Subjective:    Pain: 7/10  BM: -ve. Denies flatus  Diet: ADULT DIET; Full Liquid  Activity: Tolerating well    Pt reports abd pain as expected. Analgesia does help keep it controlled. Is ambulating and urinating well. Denies N/V. Review of Systems   Constitutional: Negative for chills and fever. HENT: Negative for ear pain, mouth sores, sore throat and tinnitus. Eyes: Negative for photophobia, redness and itching. Respiratory: Negative for apnea, choking and stridor. Cardiovascular: Negative for chest pain and palpitations. Gastrointestinal: Positive for abdominal pain. Negative for anal bleeding, constipation, nausea, rectal pain and vomiting. Endocrine: Negative for polydipsia. Genitourinary: Negative for enuresis, flank pain and hematuria. Musculoskeletal: Negative for back pain, joint swelling and myalgias. Skin: Negative for color change and pallor. Allergic/Immunologic: Negative for environmental allergies. Neurological: Negative for syncope and speech difficulty. Psychiatric/Behavioral: Negative for confusion and hallucinations. Objective:    Vitals: VITALS:  BP (!) 138/92   Pulse 95   Temp 98.5 °F (36.9 °C) (Oral)   Resp 17   Ht 5' 3\" (1.6 m)   Wt 153 lb 14.1 oz (69.8 kg)   LMP 10/02/2012   SpO2 98%   BMI 27.26 kg/m²   TEMPERATURE:  Current - Temp: 98.5 °F (36.9 °C);  Max - Temp  Av.1 °F (36.7 °C)  Min: 96.8 °F (36 °C)  Max: 98.6 °F (37 °C)    I/O:  0701 -  0700  In: 550 [I.V.:550]  Out: 220 [Urine:200]    Labs/Imaging Results:   Lab Results   Component Value Date    WBC 11.5 (H) 2022    HGB 7.9 (L) 2022    HCT 25.9 (L) 2022    MCV 97.0 2022     2022     Lab Results   Component Value Date     2022    K 4.6 2022    CL 105 2022    CO2 23 2022    BUN 9 2022    CREATININE 0.7 2022    GLUCOSE 126 (H) 2022    CALCIUM 7.9 (L) 2022    PROT 5.0 (L) 2022    LABALBU 2.9 (L) 2022    BILITOT 0.3 2022    ALKPHOS 93 2022    AST 15 2022    ALT 11 2022    LABGLOM >60 2022    GFRAA >60 2022       Scheduled Meds: sodium chloride flush, 10 mL, IntraVENous, 2 times per day    enoxaparin, 40 mg, SubCUTAneous, Daily    Physical Exam:  Physical Exam  Constitutional:       Appearance: She is well-developed. HENT:      Head: Normocephalic. Eyes:      Pupils: Pupils are equal, round, and reactive to light. Cardiovascular:      Rate and Rhythm: Normal rate. Pulmonary:      Effort: Pulmonary effort is normal.   Abdominal:      General: There is distension. Palpations: Abdomen is soft. There is no mass. Tenderness: There is abdominal tenderness. There is no guarding or rebound. Comments: Midline incision with dressing intact. Dressing with bloody drainage to lower 1/3rd. Incision well approximated with staples in place. Musculoskeletal:         General: Normal range of motion. Cervical back: Normal range of motion and neck supple. Skin:     General: Skin is warm. Neurological:      Mental Status: She is alert and oriented to person, place, and time. Assessment and Plan:    Patient Active Problem List:     Gastroenteritis     Encounter for supervision of normal pregnancy in multigravida     Status post repeat low transverse  section     Ventral hernia     S/P repair of ventral hernia     Abdominal wall mass of epigastric region     Hemoperitoneum     Internal hernia      Diet: Will advance to FLD today. Wound care: Wash abdomen daily with soap and water. Once dry, reapply island dressing. Activity: As tolerated. Needs to ambulate and get up to chair. Will get IS today. Abx: None needed. Med changes:  Added PRN

## 2022-01-25 NOTE — PROGRESS NOTES
Physician Progress Note      PATIENT:               Varsha Mejia  CSN #:                  [id number]  :                       1980  ADMIT DATE:       2022 11:46 AM  [address] [place] DATE:  RESPONDING  PROVIDER #:        Debby Rodríguez PA-C          QUERY TEXT:    Surgical Team,    Patient admitted with abdominal pain and is s/p appendectomy, salpingectomy   and oophorectomy. If possible, please document in progress notes and discharge   summary if you are evaluating and/or treating any of the following: The medical record reflects the following:  Risk Factors: necrotic ovary and fallopian tube  Clinical Indicators: per op note: \"the appendix was identified with a mucinous   appearing growth at the tip of the appendix. I elected to proceed with the   resection of the appendix and the mesoappendix. \"  Treatment: labs, imaging, appendectomy, IV atb    Thank you,  Tram Vuong RN CDS  [phone]  Options provided:  -- Appendicitis confirmed  -- Appendicitis was ruled out  -- Other - I will add my own diagnosis  -- Disagree - Not applicable / Not valid  -- Disagree - Clinically unable to determine / Unknown  -- Refer to Clinical Documentation Reviewer    PROVIDER RESPONSE TEXT:    Pt with growth on appendix. No appendicitis    Query created by: Daksha Nicholas on 2022 1:06 PM      QUERY TEXT:    Surgical Team,    Pt admitted with torsion of right ovary and fallopian tube. Pt noted to have   SIRS. If possible, please document in the progress notes and discharge summary   if you are evaluating and /or treating any of the following:     The medical record reflects the following:  Risk Factors: necrosis of ovary  Clinical Indicators: RR 9-21, HR , BP 75//97, temp 96.8-98.6, WBC   7.2-12.3, necrosis or right ovary and fallopian tube  Treatment: labs, imaging, IV atb, Exploratory laparotomy, right oophorectomy,   appendectomy and abd wash out    Thank you,  Tram Vuong RN CDS  [phone]  Options provided:  -- Sepsis, present on admission  -- Sepsis, present on admission, now resolved  -- Sepsis, not present on admission  -- Sepsis was ruled out  -- Other - I will add my own diagnosis  -- Disagree - Not applicable / Not valid  -- Disagree - Clinically unable to determine / Unknown  -- Refer to Clinical Documentation Reviewer    PROVIDER RESPONSE TEXT:    After further study, sepsis was ruled out for this patient. Query created by: Erin Power on 1/25/2022 1:20 PM      QUERY TEXT:    Hospitalists,    Patient admitted with torsion of right ovary and fallopian tube. Noted   documentation of mesenteric defect repair in the operative note. If possible,   please provide  further clarification regarding the type of mesenteric defect. The medical record reflects the following:  Risk Factors: hemoperitoneum  Clinical Indicators: per Op note: \"There is a small mesenteric defect at the   jejunojejunostomy. This was closed using 3-0 silk.'  Treatment: surgical repair    Thank you,  Muas Rivera RN CDS  [phone]  Options provided:  -- Mesenteric ulcer  -- Mesenteric laceration/tear  -- Mesenteric defect type ##please specify: This patient has a mesenteric   defect of ##please specify. -- Other - I will add my own diagnosis  -- Disagree - Not applicable / Not valid  -- Disagree - Clinically unable to determine / Unknown  -- Refer to Clinical Documentation Reviewer    PROVIDER RESPONSE TEXT:    Mesenteric defect type ##please specify: This patient has a mesenteric defect   of ##please specify.     Query created by: Erin Power on 1/25/2022 1:35 PM      Electronically signed by:  Miladys Leo PA-C 1/25/2022 1:58 PM

## 2022-01-26 LAB
ALBUMIN SERPL-MCNC: 3 GM/DL (ref 3.4–5)
ALP BLD-CCNC: 83 IU/L (ref 40–129)
ALT SERPL-CCNC: 9 U/L (ref 10–40)
ANION GAP SERPL CALCULATED.3IONS-SCNC: 7 MMOL/L (ref 4–16)
AST SERPL-CCNC: 13 IU/L (ref 15–37)
BILIRUB SERPL-MCNC: 0.2 MG/DL (ref 0–1)
BUN BLDV-MCNC: 6 MG/DL (ref 6–23)
CALCIUM SERPL-MCNC: 7.9 MG/DL (ref 8.3–10.6)
CHLORIDE BLD-SCNC: 105 MMOL/L (ref 99–110)
CO2: 26 MMOL/L (ref 21–32)
CREAT SERPL-MCNC: 0.5 MG/DL (ref 0.6–1.1)
GFR AFRICAN AMERICAN: >60 ML/MIN/1.73M2
GFR NON-AFRICAN AMERICAN: >60 ML/MIN/1.73M2
GLUCOSE BLD-MCNC: 94 MG/DL (ref 70–99)
HCT VFR BLD CALC: 21 % (ref 37–47)
HEMOGLOBIN: 6.4 GM/DL (ref 12.5–16)
MCH RBC QN AUTO: 29.9 PG (ref 27–31)
MCHC RBC AUTO-ENTMCNC: 30.5 % (ref 32–36)
MCV RBC AUTO: 98.1 FL (ref 78–100)
PDW BLD-RTO: 12.9 % (ref 11.7–14.9)
PLATELET # BLD: 185 K/CU MM (ref 140–440)
PMV BLD AUTO: 9.5 FL (ref 7.5–11.1)
POTASSIUM SERPL-SCNC: 3.7 MMOL/L (ref 3.5–5.1)
RBC # BLD: 2.14 M/CU MM (ref 4.2–5.4)
SODIUM BLD-SCNC: 138 MMOL/L (ref 135–145)
TOTAL PROTEIN: 5 GM/DL (ref 6.4–8.2)
WBC # BLD: 5.7 K/CU MM (ref 4–10.5)

## 2022-01-26 PROCEDURE — 94150 VITAL CAPACITY TEST: CPT

## 2022-01-26 PROCEDURE — 85027 COMPLETE CBC AUTOMATED: CPT

## 2022-01-26 PROCEDURE — APPNB45 APP NON BILLABLE 31-45 MINUTES: Performed by: PHYSICIAN ASSISTANT

## 2022-01-26 PROCEDURE — 6370000000 HC RX 637 (ALT 250 FOR IP): Performed by: NURSE PRACTITIONER

## 2022-01-26 PROCEDURE — 1200000000 HC SEMI PRIVATE

## 2022-01-26 PROCEDURE — 36430 TRANSFUSION BLD/BLD COMPNT: CPT

## 2022-01-26 PROCEDURE — 86901 BLOOD TYPING SEROLOGIC RH(D): CPT

## 2022-01-26 PROCEDURE — 86900 BLOOD TYPING SEROLOGIC ABO: CPT

## 2022-01-26 PROCEDURE — 99024 POSTOP FOLLOW-UP VISIT: CPT | Performed by: PHYSICIAN ASSISTANT

## 2022-01-26 PROCEDURE — 86850 RBC ANTIBODY SCREEN: CPT

## 2022-01-26 PROCEDURE — P9016 RBC LEUKOCYTES REDUCED: HCPCS

## 2022-01-26 PROCEDURE — 6360000002 HC RX W HCPCS: Performed by: PHYSICIAN ASSISTANT

## 2022-01-26 PROCEDURE — 94761 N-INVAS EAR/PLS OXIMETRY MLT: CPT

## 2022-01-26 PROCEDURE — 36415 COLL VENOUS BLD VENIPUNCTURE: CPT

## 2022-01-26 PROCEDURE — 2580000003 HC RX 258: Performed by: PHYSICIAN ASSISTANT

## 2022-01-26 PROCEDURE — 80053 COMPREHEN METABOLIC PANEL: CPT

## 2022-01-26 PROCEDURE — 86922 COMPATIBILITY TEST ANTIGLOB: CPT

## 2022-01-26 RX ORDER — SODIUM CHLORIDE 9 MG/ML
INJECTION, SOLUTION INTRAVENOUS PRN
Status: DISCONTINUED | OUTPATIENT
Start: 2022-01-26 | End: 2022-01-27 | Stop reason: HOSPADM

## 2022-01-26 RX ORDER — ACETAMINOPHEN 325 MG/1
650 TABLET ORAL EVERY 4 HOURS PRN
Status: DISCONTINUED | OUTPATIENT
Start: 2022-01-26 | End: 2022-01-27 | Stop reason: HOSPADM

## 2022-01-26 RX ORDER — HYDROCODONE BITARTRATE AND ACETAMINOPHEN 5; 325 MG/1; MG/1
1 TABLET ORAL EVERY 6 HOURS PRN
Status: DISCONTINUED | OUTPATIENT
Start: 2022-01-26 | End: 2022-01-27 | Stop reason: HOSPADM

## 2022-01-26 RX ADMIN — MORPHINE SULFATE 2 MG: 4 INJECTION INTRAVENOUS at 17:56

## 2022-01-26 RX ADMIN — SODIUM CHLORIDE, PRESERVATIVE FREE 10 ML: 5 INJECTION INTRAVENOUS at 04:02

## 2022-01-26 RX ADMIN — ACETAMINOPHEN 650 MG: 325 TABLET ORAL at 14:09

## 2022-01-26 RX ADMIN — ENOXAPARIN SODIUM 40 MG: 100 INJECTION SUBCUTANEOUS at 08:43

## 2022-01-26 RX ADMIN — ACETAMINOPHEN 650 MG: 325 TABLET ORAL at 08:49

## 2022-01-26 RX ADMIN — MORPHINE SULFATE 2 MG: 4 INJECTION INTRAVENOUS at 14:00

## 2022-01-26 RX ADMIN — ONDANSETRON 4 MG: 2 INJECTION INTRAMUSCULAR; INTRAVENOUS at 00:37

## 2022-01-26 RX ADMIN — MORPHINE SULFATE 2 MG: 4 INJECTION INTRAVENOUS at 04:02

## 2022-01-26 RX ADMIN — MORPHINE SULFATE 2 MG: 4 INJECTION INTRAVENOUS at 21:24

## 2022-01-26 RX ADMIN — MORPHINE SULFATE 2 MG: 4 INJECTION INTRAVENOUS at 08:44

## 2022-01-26 RX ADMIN — MORPHINE SULFATE 2 MG: 4 INJECTION INTRAVENOUS at 00:37

## 2022-01-26 RX ADMIN — ACETAMINOPHEN 650 MG: 325 TABLET ORAL at 22:22

## 2022-01-26 RX ADMIN — ACETAMINOPHEN 650 MG: 325 TABLET ORAL at 00:43

## 2022-01-26 RX ADMIN — SODIUM CHLORIDE, PRESERVATIVE FREE 10 ML: 5 INJECTION INTRAVENOUS at 21:24

## 2022-01-26 ASSESSMENT — PAIN SCALES - GENERAL
PAINLEVEL_OUTOF10: 9
PAINLEVEL_OUTOF10: 0
PAINLEVEL_OUTOF10: 7
PAINLEVEL_OUTOF10: 7
PAINLEVEL_OUTOF10: 8
PAINLEVEL_OUTOF10: 7
PAINLEVEL_OUTOF10: 0
PAINLEVEL_OUTOF10: 7
PAINLEVEL_OUTOF10: 9
PAINLEVEL_OUTOF10: 6
PAINLEVEL_OUTOF10: 9

## 2022-01-26 ASSESSMENT — PAIN DESCRIPTION - DESCRIPTORS
DESCRIPTORS: ACHING;DISCOMFORT
DESCRIPTORS: ACHING

## 2022-01-26 ASSESSMENT — PAIN DESCRIPTION - FREQUENCY
FREQUENCY: INTERMITTENT

## 2022-01-26 ASSESSMENT — ENCOUNTER SYMPTOMS
COLOR CHANGE: 0
APNEA: 0
VOMITING: 0
BACK PAIN: 0
SORE THROAT: 0
STRIDOR: 0
EYE REDNESS: 0
PHOTOPHOBIA: 0
CHOKING: 0
CONSTIPATION: 0
EYE ITCHING: 0
ANAL BLEEDING: 0
NAUSEA: 0
ABDOMINAL PAIN: 1
RECTAL PAIN: 0

## 2022-01-26 ASSESSMENT — PAIN DESCRIPTION - PAIN TYPE
TYPE: SURGICAL PAIN
TYPE: SURGICAL PAIN
TYPE: ACUTE PAIN
TYPE: SURGICAL PAIN

## 2022-01-26 ASSESSMENT — PAIN DESCRIPTION - ONSET
ONSET: ON-GOING

## 2022-01-26 ASSESSMENT — PAIN DESCRIPTION - ORIENTATION
ORIENTATION: MID;LOWER
ORIENTATION: LOWER;MID
ORIENTATION: MID

## 2022-01-26 ASSESSMENT — PAIN DESCRIPTION - LOCATION
LOCATION: ABDOMEN
LOCATION: ABDOMEN
LOCATION: HEAD;ABDOMEN
LOCATION: HEAD

## 2022-01-26 ASSESSMENT — PAIN - FUNCTIONAL ASSESSMENT
PAIN_FUNCTIONAL_ASSESSMENT: PREVENTS OR INTERFERES SOME ACTIVE ACTIVITIES AND ADLS

## 2022-01-26 ASSESSMENT — PAIN DESCRIPTION - PROGRESSION
CLINICAL_PROGRESSION: NOT CHANGED
CLINICAL_PROGRESSION: NOT CHANGED

## 2022-01-26 NOTE — PLAN OF CARE
Problem: Pain:  Goal: Pain level will decrease  Description: Pain level will decrease  1/26/2022 1631 by Na Daniel RN  Outcome: Ongoing  1/26/2022 0312 by Stacey Lobato RN  Outcome: Ongoing  Goal: Control of acute pain  Description: Control of acute pain  1/26/2022 1631 by Na Daniel RN  Outcome: Ongoing  1/26/2022 0312 by Stacey Lobato RN  Outcome: Ongoing  Goal: Control of chronic pain  Description: Control of chronic pain  1/26/2022 1631 by Na Daniel RN  Outcome: Ongoing  1/26/2022 0312 by Stacey Lobato RN  Outcome: Ongoing

## 2022-01-26 NOTE — PROGRESS NOTES
2022    K 4.6 2022     2022    CO2 23 2022    BUN 9 2022    CREATININE 0.7 2022    GLUCOSE 126 (H) 2022    CALCIUM 7.9 (L) 2022    PROT 5.0 (L) 2022    LABALBU 2.9 (L) 2022    BILITOT 0.3 2022    ALKPHOS 93 2022    AST 15 2022    ALT 11 2022    LABGLOM >60 2022    GFRAA >60 2022       Scheduled Meds: sodium chloride flush, 10 mL, IntraVENous, 2 times per day    enoxaparin, 40 mg, SubCUTAneous, Daily    Physical Exam:  Physical Exam  Constitutional:       Appearance: She is well-developed. HENT:      Head: Normocephalic. Eyes:      Pupils: Pupils are equal, round, and reactive to light. Cardiovascular:      Rate and Rhythm: Normal rate. Pulmonary:      Effort: Pulmonary effort is normal.   Abdominal:      General: There is no distension. Palpations: Abdomen is soft. There is no mass. Tenderness: There is abdominal tenderness. There is no guarding or rebound. Comments: Midline incision with dressing intact. Dressing with bloody drainage to lower 1/3rd. Incision well approximated with staples in place. Musculoskeletal:         General: Normal range of motion. Cervical back: Normal range of motion and neck supple. Skin:     General: Skin is warm. Neurological:      Mental Status: She is alert and oriented to person, place, and time. Assessment and Plan:    Patient Active Problem List:     Gastroenteritis     Encounter for supervision of normal pregnancy in multigravida     Status post repeat low transverse  section     Ventral hernia     S/P repair of ventral hernia     Abdominal wall mass of epigastric region     Hemoperitoneum     Internal hernia      Diet: Will advance to low fiber diet ans saline lock. Wound care: Wash abdomen daily with soap and water. Once dry, reapply island dressing. OK to shower   Activity: As tolerated.  Needs to ambulate and get up to chair. Continue IS  Abx: None needed. Med changes: None  Labs/Imaging: Hgb 6.4. Will transfuse 1u PRBC  Disposition: Awaiting clinical improvement.        Beni Peters PA-C

## 2022-01-27 VITALS
HEIGHT: 63 IN | HEART RATE: 86 BPM | BODY MASS INDEX: 27.27 KG/M2 | RESPIRATION RATE: 16 BRPM | SYSTOLIC BLOOD PRESSURE: 138 MMHG | WEIGHT: 153.88 LBS | OXYGEN SATURATION: 97 % | DIASTOLIC BLOOD PRESSURE: 82 MMHG | TEMPERATURE: 97.3 F

## 2022-01-27 PROBLEM — N83.511 TORSION OF RIGHT OVARY AND OVARIAN PEDICLE: Status: ACTIVE | Noted: 2022-01-27

## 2022-01-27 PROBLEM — N83.511 TORSION OF RIGHT OVARY AND OVARIAN PEDICLE: Status: RESOLVED | Noted: 2022-01-27 | Resolved: 2022-01-27

## 2022-01-27 PROBLEM — K66.1 HEMOPERITONEUM: Status: RESOLVED | Noted: 2022-01-24 | Resolved: 2022-01-27

## 2022-01-27 LAB
ABO/RH: NORMAL
ANTIBODY SCREEN: NEGATIVE
COMPONENT: NORMAL
CROSSMATCH RESULT: NORMAL
HCT VFR BLD CALC: 24 % (ref 37–47)
HEMOGLOBIN: 7.5 GM/DL (ref 12.5–16)
STATUS: NORMAL
TRANSFUSION STATUS: NORMAL
UNIT DIVISION: 0
UNIT NUMBER: NORMAL

## 2022-01-27 PROCEDURE — 85014 HEMATOCRIT: CPT

## 2022-01-27 PROCEDURE — 2580000003 HC RX 258: Performed by: PHYSICIAN ASSISTANT

## 2022-01-27 PROCEDURE — 85018 HEMOGLOBIN: CPT

## 2022-01-27 PROCEDURE — 85027 COMPLETE CBC AUTOMATED: CPT

## 2022-01-27 PROCEDURE — 36415 COLL VENOUS BLD VENIPUNCTURE: CPT

## 2022-01-27 PROCEDURE — 99024 POSTOP FOLLOW-UP VISIT: CPT | Performed by: PHYSICIAN ASSISTANT

## 2022-01-27 PROCEDURE — 6370000000 HC RX 637 (ALT 250 FOR IP): Performed by: PHYSICIAN ASSISTANT

## 2022-01-27 PROCEDURE — APPNB45 APP NON BILLABLE 31-45 MINUTES: Performed by: PHYSICIAN ASSISTANT

## 2022-01-27 PROCEDURE — 6360000002 HC RX W HCPCS: Performed by: PHYSICIAN ASSISTANT

## 2022-01-27 RX ORDER — FERROUS SULFATE 325(65) MG
325 TABLET ORAL
Qty: 30 TABLET | Refills: 0 | Status: SHIPPED | OUTPATIENT
Start: 2022-01-27 | End: 2022-03-24 | Stop reason: SDUPTHER

## 2022-01-27 RX ORDER — HYDROCODONE BITARTRATE AND ACETAMINOPHEN 5; 325 MG/1; MG/1
1 TABLET ORAL EVERY 6 HOURS PRN
Qty: 10 TABLET | Refills: 0 | Status: SHIPPED | OUTPATIENT
Start: 2022-01-27 | End: 2022-01-30

## 2022-01-27 RX ORDER — CYCLOBENZAPRINE HCL 10 MG
10 TABLET ORAL 3 TIMES DAILY PRN
Qty: 30 TABLET | Refills: 0 | Status: SHIPPED | OUTPATIENT
Start: 2022-01-27 | End: 2022-02-06

## 2022-01-27 RX ADMIN — HYDROCODONE BITARTRATE AND ACETAMINOPHEN 1 TABLET: 5; 325 TABLET ORAL at 08:22

## 2022-01-27 RX ADMIN — HYDROCODONE BITARTRATE AND ACETAMINOPHEN 1 TABLET: 5; 325 TABLET ORAL at 00:08

## 2022-01-27 RX ADMIN — SODIUM CHLORIDE, PRESERVATIVE FREE 10 ML: 5 INJECTION INTRAVENOUS at 08:22

## 2022-01-27 RX ADMIN — MORPHINE SULFATE 2 MG: 4 INJECTION INTRAVENOUS at 04:16

## 2022-01-27 RX ADMIN — MORPHINE SULFATE 2 MG: 4 INJECTION INTRAVENOUS at 10:05

## 2022-01-27 RX ADMIN — ENOXAPARIN SODIUM 40 MG: 100 INJECTION SUBCUTANEOUS at 08:22

## 2022-01-27 ASSESSMENT — PAIN DESCRIPTION - DESCRIPTORS
DESCRIPTORS: ACHING;DISCOMFORT
DESCRIPTORS: ACHING;DISCOMFORT
DESCRIPTORS: ACHING

## 2022-01-27 ASSESSMENT — PAIN DESCRIPTION - DIRECTION: RADIATING_TOWARDS: BACK

## 2022-01-27 ASSESSMENT — PAIN SCALES - GENERAL
PAINLEVEL_OUTOF10: 6
PAINLEVEL_OUTOF10: 6
PAINLEVEL_OUTOF10: 7
PAINLEVEL_OUTOF10: 6
PAINLEVEL_OUTOF10: 7

## 2022-01-27 ASSESSMENT — PAIN DESCRIPTION - PAIN TYPE
TYPE: SURGICAL PAIN

## 2022-01-27 ASSESSMENT — PAIN DESCRIPTION - LOCATION
LOCATION: ABDOMEN

## 2022-01-27 ASSESSMENT — PAIN - FUNCTIONAL ASSESSMENT
PAIN_FUNCTIONAL_ASSESSMENT: PREVENTS OR INTERFERES SOME ACTIVE ACTIVITIES AND ADLS

## 2022-01-27 ASSESSMENT — PAIN DESCRIPTION - ORIENTATION
ORIENTATION: MID

## 2022-01-27 ASSESSMENT — PAIN DESCRIPTION - PROGRESSION
CLINICAL_PROGRESSION: NOT CHANGED

## 2022-01-27 ASSESSMENT — PAIN DESCRIPTION - ONSET
ONSET: ON-GOING

## 2022-01-27 ASSESSMENT — PAIN DESCRIPTION - FREQUENCY
FREQUENCY: INTERMITTENT

## 2022-01-27 NOTE — DISCHARGE SUMMARY
Physician Discharge Summary     Patient ID:  Sakshi Johnson  [id number]  82 y.o.  1980    Admit date: 1/24/2022    Discharge date: 01/27/22    Admitting Physician: Misael Mukherjee MD     Discharge Physician:same    Admission Diagnoses: Hemoperitoneum [K66.1]    Discharge Diagnoses: same    Admission Condition:fair    Discharged Condition: Good    Indication for Admission: hemoperitoneum    Hospital Course:  Patient had an uneventful hospital course. Patient was able to tolerate diet, ambulate and have regular bowel function present discharge. Post-op she had drop in Hgb 2/2 her hemoperituneum. She received 1u PRBC. Will go home with iron tablets. Consults: none    Outstanding Order Results     No orders found from 12/26/2021 to 1/25/2022. Treatments: surgery: exploratory laparoscopy, right oophorectomy, appendectomy and abd washout. Discharge Exam:  Physical Exam  Constitutional:       Appearance: She is well-developed. HENT:      Head: Normocephalic. Eyes:      Pupils: Pupils are equal, round, and reactive to light. Cardiovascular:      Rate and Rhythm: Normal rate. Pulmonary:      Effort: Pulmonary effort is normal.   Abdominal:      General: There is no distension. Palpations: Abdomen is soft. There is no mass. Tenderness: There is no abdominal tenderness. There is no guarding or rebound. Comments: Midline incision with staples intact. Well approximated without signs of infection. Musculoskeletal:         General: Normal range of motion. Cervical back: Normal range of motion and neck supple. Skin:     General: Skin is warm. Neurological:      Mental Status: She is alert and oriented to person, place, and time.          Disposition: home    Patient Instructions:      Medication List      START taking these medications    cyclobenzaprine 10 MG tablet  Commonly known as: FLEXERIL  Take 1 tablet by mouth 3 times daily as needed for Muscle spasms     ferrous sulfate 325 (65 Fe) MG tablet  Commonly known as: IRON 325  Take 1 tablet by mouth daily (with breakfast)        CHANGE how you take these medications    HYDROcodone-acetaminophen 5-325 MG per tablet  Commonly known as: NORCO  Take 1 tablet by mouth every 6 hours as needed for Pain for up to 3 days. What changed: how much to take        CONTINUE taking these medications    calcium carbonate 500 MG chewable tablet  Commonly known as: Antacid  Take 1 tablet by mouth daily     dicyclomine 10 MG capsule  Commonly known as: Bentyl  Take 1 capsule by mouth 3 times daily As needed for abdominal pain     famotidine 20 MG tablet  Commonly known as: Pepcid  Take 1 tablet by mouth 2 times daily     * naproxen 500 MG tablet  Commonly known as: Naprosyn  Take 1 tablet by mouth 2 times daily     * naproxen 500 MG tablet  Commonly known as: Naprosyn  Take 1 tablet by mouth 2 times daily     ondansetron 4 MG disintegrating tablet  Commonly known as: Zofran ODT  Take 1 tablet by mouth every 8 hours as needed for Nausea         * This list has 2 medication(s) that are the same as other medications prescribed for you. Read the directions carefully, and ask your doctor or other care provider to review them with you. Where to Get Your Medications      These medications were sent to [address] [place] [address] (), [address]  [address] ([facility]    Phone: [phone]   · cyclobenzaprine 10 MG tablet  · ferrous sulfate 325 (65 Fe) MG tablet  · HYDROcodone-acetaminophen 5-325 MG per tablet         Activity: activity as tolerated and no lifting, Driving, or Strenuous exercise for 2 weeks    Diet: regular diet    Wound Care: keep wound clean and dry    Follow-up with Dr Deanna Desai or Radha Wiseman PA-C by calling [phone]. The Office staff will determine follow up time per protocol.     Signed:  Radha Wiseman PA-C

## 2022-01-27 NOTE — PROGRESS NOTES
Patient discharged home. Discharge instructions and medications reviewed with patient. Prescriptions called into pharmacy. Verbalized understanding. Discharged per private vehicle.

## 2022-01-28 ENCOUNTER — CARE COORDINATION (OUTPATIENT)
Dept: CASE MANAGEMENT | Age: 42
End: 2022-01-28

## 2022-01-28 NOTE — CARE COORDINATION
Care Transitions Outreach Attempt    Call within 2 business days of discharge: Yes   Attempted to reach patient for transitions of care follow up. Unable to reach patient. Patient: Gerson Gilbert Patient : 1980 MRN: [id number]    Last Discharge [facility]       Complaint Diagnosis Description Type Department Provider    22 Abdominal Pain Torsion of right ovary and ovarian pedicle . .. ED to Hosp-Admission (Discharged) Jean Shanks MD; Leta Bryant MD        24 Hour Transition of Care Call:    1st Attempt to contact patient for Initial 24 Hour Transition from hospital to home Call:   Patient not reached. Left HIPAA Compliant message on Voice Mail to call CTN (number given) with any questions and an update on patient's condition since discharge. Will continue to follow. Dave Rodrigues LPN    [phone]  Harpal Lizarraga / Blas Gordon Coordinator        Was this an external facility discharge?  No Discharge [facility]    Noted following upcoming appointments from discharge chart review:   [facility] follow up appointment(s):   Future Appointments   Date Time Provider Lizzy Soto   2/10/2022 10:00 AM Denita Brunson PA-C [facility]     Non-[facility] follow up appointment(s):

## 2022-01-30 ASSESSMENT — ENCOUNTER SYMPTOMS
COUGH: 0
VOMITING: 0
BACK PAIN: 0
CONSTIPATION: 1
ABDOMINAL PAIN: 1
ABDOMINAL DISTENTION: 1
EYE REDNESS: 0
SHORTNESS OF BREATH: 0
RHINORRHEA: 0
SORE THROAT: 0
NAUSEA: 1

## 2022-01-31 ENCOUNTER — CARE COORDINATION (OUTPATIENT)
Dept: CASE MANAGEMENT | Age: 42
End: 2022-01-31

## 2022-01-31 NOTE — CARE COORDINATION
Blas 45 Transitions Initial Follow Up Call    Call within 2 business days of discharge: Yes    Patient: Mikey Ariza Patient : 1980   MRN: [id number]  Reason for Admission: Abdominal pain  Discharge Date: 22 RARS: Readmission Risk Score: 11.6 ( )      Last Discharge [facility]       Complaint Diagnosis Description Type Department Provider    22 Abdominal Pain Torsion of right ovary and ovarian pedicle . .. ED to Hosp-Admission (Discharged) (ADMIT) Adrianna Veras MD; Nadiya Grimes MD           Spoke with: Miki Wakefield, patient    Contacted patient for initial Care Transition follow up. Vivian states that she is doing okay. Reports staples are still intact. Denies having any signs/symptoms of infection-swelling, redness, drainage, fever, chills. Reports pain is tolerable. Has pain medication and taking as needed. She denies having any c/o nausea/vomiting. She is eating and drinking fluids w/o issues. Denies having any urinary or bowel issues. She is aware of when to contact her provider with any new or worsening symptoms. Reviewed medication list.  Confirmed she is taking Iron daily and Flexeril as needed. She is aware that her Norco changed. She has a follow up with the general surgeon on 2/10/22. No questions or concerns at this time. No further outreach. Transitions of Care Initial Call    Was this an external facility discharge? No Discharge Facility: [facility]     Challenges to be reviewed by the provider   Additional needs identified to be addressed with provider: No  none             Method of communication with provider : none    Was this a readmission?  No  Patient stated reason for admission: Abdominal pain, Nausea/Vomiting  Patients top risk factors for readmission: medical condition-Laparatomy exploratory appendectomy right oopherectomy for ovarian torsion    Care Transition Nurse (CTN) contacted the patient by telephone to perform post hospital discharge assessment. Verified name and  with patient as identifiers. Provided introduction to self, and explanation of the CTN role. CTN reviewed discharge instructions, medical action plan and red flags with patient who verbalized understanding. Patient given an opportunity to ask questions and does not have any further questions or concerns at this time. Were discharge instructions available to patient? Yes. Reviewed appropriate site of care based on symptoms and resources available to patient including: PCP and Specialist. The patient agrees to contact the PCP office for questions related to their healthcare. Medication reconciliation was performed with patient, who verbalizes understanding of administration of home medications. Advised obtaining a 90-day supply of all daily and as-needed medications. Covid Risk Education     Educated patient about risk for severe COVID-19 due to risk factors according to CDC guidelines. CTN reviewed discharge instructions, medical action plan and red flag symptoms with the patient who verbalized understanding. Discussed COVID vaccination status: No. Education provided on COVID-19 vaccination as appropriate. Discussed exposure protocols and quarantine with CDC Guidelines. Patient was given an opportunity to verbalize any questions and concerns and agrees to contact CTN or health care provider for questions related to their healthcare. Reviewed and educated patient on any new and changed medications related to discharge diagnosis. Was patient discharged with a pulse oximeter? No Discussed and confirmed pulse oximeter discharge instructions and when to notify provider or seek emergency care. CTN provided contact information. No further follow-up call indicated based on severity of symptoms and risk factors.     Follow Up  Future Appointments   Date Time Provider Lizzy Soto   2/10/2022 10:00 AM KATELYN Rodriguez ANA Brown RN

## 2022-02-07 ENCOUNTER — HOSPITAL ENCOUNTER (OUTPATIENT)
Age: 42
Discharge: HOME | End: 2022-02-07
Payer: COMMERCIAL

## 2022-02-07 DIAGNOSIS — K66.1 HEMOPERITONEUM: ICD-10-CM

## 2022-02-07 LAB
ALBUMIN SERPL-MCNC: 3.7 GM/DL (ref 3.4–5)
ALP BLD-CCNC: 116 IU/L (ref 40–129)
ALT SERPL-CCNC: 10 U/L (ref 10–40)
ANION GAP SERPL CALCULATED.3IONS-SCNC: 10 MMOL/L (ref 4–16)
AST SERPL-CCNC: 14 IU/L (ref 15–37)
BILIRUB SERPL-MCNC: 0.2 MG/DL (ref 0–1)
BUN BLDV-MCNC: 11 MG/DL (ref 6–23)
CALCIUM SERPL-MCNC: 8.8 MG/DL (ref 8.3–10.6)
CHLORIDE BLD-SCNC: 103 MMOL/L (ref 99–110)
CO2: 25 MMOL/L (ref 21–32)
CREAT SERPL-MCNC: 0.8 MG/DL (ref 0.6–1.1)
GFR AFRICAN AMERICAN: >60 ML/MIN/1.73M2
GFR NON-AFRICAN AMERICAN: >60 ML/MIN/1.73M2
GLUCOSE BLD-MCNC: 73 MG/DL (ref 70–99)
HCT VFR BLD CALC: 36.1 % (ref 37–47)
HEMOGLOBIN: 11.1 GM/DL (ref 12.5–16)
MCH RBC QN AUTO: 29.7 PG (ref 27–31)
MCHC RBC AUTO-ENTMCNC: 30.7 % (ref 32–36)
MCV RBC AUTO: 96.5 FL (ref 78–100)
PDW BLD-RTO: 12.9 % (ref 11.7–14.9)
PLATELET # BLD: 363 K/CU MM (ref 140–440)
PMV BLD AUTO: 9.8 FL (ref 7.5–11.1)
POTASSIUM SERPL-SCNC: 4.7 MMOL/L (ref 3.5–5.1)
RBC # BLD: 3.74 M/CU MM (ref 4.2–5.4)
SODIUM BLD-SCNC: 138 MMOL/L (ref 135–145)
TOTAL PROTEIN: 6.4 GM/DL (ref 6.4–8.2)
WBC # BLD: 8.1 K/CU MM (ref 4–10.5)

## 2022-02-07 PROCEDURE — 80053 COMPREHEN METABOLIC PANEL: CPT

## 2022-02-07 PROCEDURE — 36415 COLL VENOUS BLD VENIPUNCTURE: CPT

## 2022-02-07 PROCEDURE — 85027 COMPLETE CBC AUTOMATED: CPT

## 2022-02-10 ENCOUNTER — OFFICE VISIT (OUTPATIENT)
Dept: SURGERY | Age: 42
End: 2022-02-10

## 2022-02-10 VITALS
WEIGHT: 153 LBS | HEART RATE: 98 BPM | OXYGEN SATURATION: 100 % | DIASTOLIC BLOOD PRESSURE: 68 MMHG | HEIGHT: 63 IN | SYSTOLIC BLOOD PRESSURE: 120 MMHG | BODY MASS INDEX: 27.11 KG/M2

## 2022-02-10 DIAGNOSIS — Z01.818 PRE-OP TESTING: ICD-10-CM

## 2022-02-10 DIAGNOSIS — C18.1 CANCER OF APPENDIX (HCC): ICD-10-CM

## 2022-02-10 DIAGNOSIS — Z48.89 ENCOUNTER FOR POSTOPERATIVE CARE: Primary | ICD-10-CM

## 2022-02-10 PROCEDURE — 99024 POSTOP FOLLOW-UP VISIT: CPT | Performed by: PHYSICIAN ASSISTANT

## 2022-02-10 PROCEDURE — APPNB30 APP NON BILLABLE TIME 0-30 MINS: Performed by: PHYSICIAN ASSISTANT

## 2022-02-10 RX ORDER — SODIUM, POTASSIUM,MAG SULFATES 17.5-3.13G
177 SOLUTION, RECONSTITUTED, ORAL ORAL SEE ADMIN INSTRUCTIONS
Qty: 2 EACH | Refills: 0 | Status: SHIPPED | OUTPATIENT
Start: 2022-02-10 | End: 2022-06-15 | Stop reason: ALTCHOICE

## 2022-02-10 NOTE — PROGRESS NOTES
Chief Complaint   Patient presents with    Post-Op Check     1st po LAPAROTOMY EXPLORATORY APPENDECTOMY RIGHT OOPHERECTOMY FOR OVARIAN TORSION         SUBJECTIVE:  Patient presents today for her 1st post op visit following exploratory laparotomy, appendectomy and right oophorectomy. Pt reports that pain is intermittent and mild. Pt is  tolerating a regular diet. BMs are WNL. Incisions: minbruising and no discharge. staples intact. Past Surgical History:   Procedure Laterality Date    ABDOMINOPLASTY       SECTION, LOW TRANSVERSE  1997    ENDOMETRIAL ABLATION      GASTRIC BYPASS SURGERY      HYSTERECTOMY      LAPAROTOMY N/A 2022    LAPAROTOMY EXPLORATORY APPENDECTOMY RIGHT OOPHERECTOMY FOR OVARIAN TORSION performed by Tylor Fletcher MD at [facility]       No past medical history on file. No family history on file. Social History     Socioeconomic History    Marital status:      Spouse name: Not on file    Number of children: Not on file    Years of education: Not on file    Highest education level: Not on file   Occupational History    Not on file   Tobacco Use    Smoking status: Former Smoker     Types: Cigarettes     Quit date: 2009     Years since quittin.7    Smokeless tobacco: Never Used   Substance and Sexual Activity    Alcohol use: No    Drug use: No    Sexual activity: Yes     Partners: Male   Other Topics Concern    Not on file   Social History Narrative    Not on file     Social Determinants of Health     Financial Resource Strain:     Difficulty of Paying Living Expenses: Not on file   Food Insecurity:     Worried About Running Out of Food in the Last Year: Not on file    Darrian of Food in the Last Year: Not on file   Transportation Needs:     Lack of Transportation (Medical): Not on file    Lack of Transportation (Non-Medical):  Not on file   Physical Activity:     Days of Exercise per Week: Not on file and   hemorrhage with adherent blood        clot, suggestive of torsion. -     Focal tubal ovarian adhesions. -     Benign paratubal cyst.     ASSESSMENT:  Patient doing well on this post operative check. Wounds well healed. 1. Encounter for postoperative care    2. Cancer of appendix ([address]    3. Pre-op testing        PLAN:  Discussed pathology results at length with the pt. Will refer to Dr. Nat Coello urgently, and return to the office in 2 weeks. Pt will need colonoscopy, and likely a right hemicolectomy. Did discuss colonoscopy and order the prep, but will wait until after she sees Dr. Nat Coello to schedule. Pt is to increase activities as tolerated. Work: should remain out of work for the time being. Orders Placed This Encounter   Procedures   Judy Bach MD, Hematology Oncology, [facility]        Orders Placed This Encounter   Medications    Na Sulfate-K Sulfate-Mg Sulf (SUPREP) 17.5-3.13-1.6 GM/177ML SOLN solution     Sig: Take 177 mLs by mouth See Admin Instructions     Dispense:  2 each     Refill:  0        Follow Up: Return in about 2 weeks (around 2/24/2022).     Ramona Agudelo PA-C

## 2022-02-11 ENCOUNTER — HOSPITAL ENCOUNTER (OUTPATIENT)
Dept: INFUSION THERAPY | Age: 42
Discharge: HOME | End: 2022-02-11
Payer: COMMERCIAL

## 2022-02-11 ENCOUNTER — INITIAL CONSULT (OUTPATIENT)
Dept: ONCOLOGY | Age: 42
End: 2022-02-11
Payer: COMMERCIAL

## 2022-02-11 VITALS
WEIGHT: 152.6 LBS | TEMPERATURE: 97.5 F | SYSTOLIC BLOOD PRESSURE: 158 MMHG | HEART RATE: 75 BPM | BODY MASS INDEX: 27.04 KG/M2 | DIASTOLIC BLOOD PRESSURE: 86 MMHG | HEIGHT: 63 IN | RESPIRATION RATE: 18 BRPM | OXYGEN SATURATION: 96 %

## 2022-02-11 DIAGNOSIS — D37.3 LOW GRADE MUCINOUS NEOPLASM OF APPENDIX: Primary | ICD-10-CM

## 2022-02-11 DIAGNOSIS — D37.3 LOW GRADE MUCINOUS NEOPLASM OF APPENDIX: ICD-10-CM

## 2022-02-11 LAB
BASOPHILS ABSOLUTE: 0 K/CU MM
BASOPHILS RELATIVE PERCENT: 0.4 % (ref 0–1)
CEA: 1.4 NG/ML
DIFFERENTIAL TYPE: ABNORMAL
EOSINOPHILS ABSOLUTE: 0.1 K/CU MM
EOSINOPHILS RELATIVE PERCENT: 1.9 % (ref 0–3)
ERYTHROCYTE SEDIMENTATION RATE: 1 MM/HR (ref 0–20)
FERRITIN: 50 NG/ML (ref 15–150)
FOLATE: 5.1 NG/ML (ref 3.1–17.5)
HCT VFR BLD CALC: 36.5 % (ref 37–47)
HEMOGLOBIN: 11.4 GM/DL (ref 12.5–16)
IRON: 53 UG/DL (ref 37–145)
LACTATE DEHYDROGENASE: 166 IU/L (ref 120–246)
LYMPHOCYTES ABSOLUTE: 1.2 K/CU MM
LYMPHOCYTES RELATIVE PERCENT: 25.2 % (ref 24–44)
MCH RBC QN AUTO: 29.3 PG (ref 27–31)
MCHC RBC AUTO-ENTMCNC: 31.2 % (ref 32–36)
MCV RBC AUTO: 93.8 FL (ref 78–100)
MONOCYTES ABSOLUTE: 0.4 K/CU MM
MONOCYTES RELATIVE PERCENT: 7.5 % (ref 0–4)
PCT TRANSFERRIN: 15 % (ref 10–44)
PDW BLD-RTO: 13.2 % (ref 11.7–14.9)
PLATELET # BLD: 240 K/CU MM (ref 140–440)
PMV BLD AUTO: 9.3 FL (ref 7.5–11.1)
RBC # BLD: 3.89 M/CU MM (ref 4.2–5.4)
RETICULOCYTE COUNT PCT: 1.9 % (ref 0.2–2.2)
SEGMENTED NEUTROPHILS ABSOLUTE COUNT: 3.1 K/CU MM
SEGMENTED NEUTROPHILS RELATIVE PERCENT: 65 % (ref 36–66)
TOTAL IRON BINDING CAPACITY: 351 UG/DL (ref 250–450)
UNSATURATED IRON BINDING CAPACITY: 298 UG/DL (ref 110–370)
VITAMIN B-12: 354.6 PG/ML (ref 211–911)
VITAMIN D 25-HYDROXY: 22.94 NG/ML
WBC # BLD: 4.7 K/CU MM (ref 4–10.5)

## 2022-02-11 PROCEDURE — 86304 IMMUNOASSAY TUMOR CA 125: CPT

## 2022-02-11 PROCEDURE — G8484 FLU IMMUNIZE NO ADMIN: HCPCS | Performed by: INTERNAL MEDICINE

## 2022-02-11 PROCEDURE — 1111F DSCHRG MED/CURRENT MED MERGE: CPT | Performed by: INTERNAL MEDICINE

## 2022-02-11 PROCEDURE — 83615 LACTATE (LD) (LDH) ENZYME: CPT

## 2022-02-11 PROCEDURE — 83540 ASSAY OF IRON: CPT

## 2022-02-11 PROCEDURE — 82306 VITAMIN D 25 HYDROXY: CPT

## 2022-02-11 PROCEDURE — 85025 COMPLETE CBC W/AUTO DIFF WBC: CPT

## 2022-02-11 PROCEDURE — 99205 OFFICE O/P NEW HI 60 MIN: CPT | Performed by: INTERNAL MEDICINE

## 2022-02-11 PROCEDURE — 1036F TOBACCO NON-USER: CPT | Performed by: INTERNAL MEDICINE

## 2022-02-11 PROCEDURE — 82728 ASSAY OF FERRITIN: CPT

## 2022-02-11 PROCEDURE — 82607 VITAMIN B-12: CPT

## 2022-02-11 PROCEDURE — 86301 IMMUNOASSAY TUMOR CA 19-9: CPT

## 2022-02-11 PROCEDURE — G8427 DOCREV CUR MEDS BY ELIG CLIN: HCPCS | Performed by: INTERNAL MEDICINE

## 2022-02-11 PROCEDURE — 82746 ASSAY OF FOLIC ACID SERUM: CPT

## 2022-02-11 PROCEDURE — 36415 COLL VENOUS BLD VENIPUNCTURE: CPT

## 2022-02-11 PROCEDURE — 82378 CARCINOEMBRYONIC ANTIGEN: CPT

## 2022-02-11 PROCEDURE — 83550 IRON BINDING TEST: CPT

## 2022-02-11 PROCEDURE — G8419 CALC BMI OUT NRM PARAM NOF/U: HCPCS | Performed by: INTERNAL MEDICINE

## 2022-02-11 PROCEDURE — 85652 RBC SED RATE AUTOMATED: CPT

## 2022-02-11 PROCEDURE — 85045 AUTOMATED RETICULOCYTE COUNT: CPT

## 2022-02-11 ASSESSMENT — PATIENT HEALTH QUESTIONNAIRE - PHQ9
SUM OF ALL RESPONSES TO PHQ9 QUESTIONS 1 & 2: 0
SUM OF ALL RESPONSES TO PHQ QUESTIONS 1-9: 0
2. FEELING DOWN, DEPRESSED OR HOPELESS: 0
SUM OF ALL RESPONSES TO PHQ QUESTIONS 1-9: 0
1. LITTLE INTEREST OR PLEASURE IN DOING THINGS: 0

## 2022-02-11 NOTE — PROGRESS NOTES
MA Rooming Questions  Patient: Hi Corley  MRN: [id number]    Date: 2/11/2022        1. Do you have any new issues? NP    5. Did the patient have a depression screening completed today?  Yes    PHQ-9 Total Score: 0 (2/11/2022  8:42 AM)       PHQ-9 Given to (if applicable):               PHQ-9 Score (if applicable):                     [] Positive     [x]  Negative              Does question #9 need addressed (if applicable)                     [] Yes    []  No               Shaina Meyers CMA

## 2022-02-13 LAB — CA 125: 63 U/ML (ref 0–35)

## 2022-02-14 NOTE — PROGRESS NOTES
Patient Name:  Solange Zurita  Patient :  1980  Patient MRN:  [id number]     Primary Oncologist: Chandan Babb MD  Referring Provider: Luis Martin MD     Date of Service: 2022      Reason for Consult: To evaluate the patient with a low-grade appendiceal mucinous neoplasm. Chief Complaint:    Chief Complaint   Patient presents with    New Patient     Patient Active Problem List:     Gastroenteritis     Encounter for supervision of normal pregnancy in multigravida     Status post repeat low transverse  section     Ventral hernia     S/P repair of ventral hernia     Abdominal wall mass of epigastric region    HPI:   Maxim Reyna. Rosalind Nunez is a 75-year-old very pleasant female with medical history significant for history of obesity, status post gastric bypass surgery in 2016, referred to me on 2022 for 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 She initially presented to [facility] on 2022 with worsening abdominal pain. She required exploratory laparotomy on 2022. She was found to have hemoperitoneum secondary to torsion and ischemic right adnexa and abnormal appendix with mucinous growth at the tip of the appendix. She is status post right oophorectomy and appendicectomy and abdominal washout. 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 1 small benign mesoappendiceal lymph node. Tumor size was 2.9 x 2 x 1 cm. Acellular mucin invades subserosa or mesoappendix but does not extend to the serosal surface. All margins negative for noninvasive tumor and mucin. Proximal mucosal margin uninvolved and approximately at least 5 cm away. Mesenteric margins is also uninvolved and approximately 1.5 cm away. There is no lymphovascular invasion, perineural invasion or tumor deposits [pT3 (LAMN with acellular mucin or mucinous epithelium that extends into the subserosa), pN0].     Since she is found to have LAMN, she was referred to me for further evaluation. She is recovering well from surgery and she doesn't have any significant symptoms at today visit. Past Medical History:     Significant for  1. History of obesity status post gastric bypass surgery in 2016    Past Surgery History:    Significant for  1. Gastric bypass surgery in   2. Sinus surgery in   3. Tonsillectomy in   4. Hernia repair in     5. Abdominoplasty in   6.  section in ,  and   7. Endometrial ablation  8. Hysterectomy in   9. Right oophorectomy for right ovarian torsion and necrosis on 2022  10. Appendicectomy for LAMN on 2022    Social History:   She is a former smoker and she quit smoking on 2009. She socially drinks alcohol and she denies illicit drug abuse. Family History:    Significant for hypertension, hyperlipidemia and mental illness in her mother. Her father has hypertension, diabetes and depression. Allergies   Allergen Reactions    Amoxicillin Hives    Other Itching and Rash     Tolerates paper tape       Current Outpatient Medications on File Prior to Visit   Medication Sig Dispense Refill    ACETAMINOPHEN PO Take by mouth      ferrous sulfate (IRON 325) 325 (65 Fe) MG tablet Take 1 tablet by mouth daily (with breakfast) 30 tablet 0    Na Sulfate-K Sulfate-Mg Sulf (SUPREP) 17.5-3.13-1.6 GM/177ML SOLN solution Take 177 mLs by mouth See Admin Instructions 2 each 0    calcium carbonate (ANTACID) 500 MG chewable tablet Take 1 tablet by mouth daily 30 tablet 0     No current facility-administered medications on file prior to visit. Review of Systems:  Constitutional:  No weight loss, No fever, No chills, No night sweats. Energy level good. Eyes:  No diplopia, No transient or permanent loss of vision, No scotomata. ENT / Mouth:  No epistaxis, No dysphagia, No hoarseness, No oral ulcers, No gingival bleeding.   No sore throat, No postnasal drip, No nasal drip, No mouth pain, No sinus pain, No tinnitus, Normal hearing. Cardiovascular:  No chest pain, No palpitations, No syncope, No upper extremity edema, No lower extremity edema, No calf discomfort. Respiratory:  No cough. No hemoptysis, No pleurisy, No wheezing, No dyspnea. Breast:  No breast mass, No pain, No nipple discharge, No change in size, No change in shape. Gastrointestinal:  No abdominal pain, No abdominal cramping, No nausea, No vomiting, No constipation, No diarrhea, No hematochezia, No melena, No jaundice, No dyspepsia, No dysphagia. Urinary:  No dysuria, No hematuria, No urinary incontinence. Gynecological:  No vaginal discharge, No suprapubic pain, No abnormal vaginal bleeding. (Female Patients Only)  Musculoskeletal:  No muscle pain, No swollen joints, No joint redness, No bone pain, No spine tenderness. Skin:  No rash, No nodules, No pruritus, No lesions. Neurologic:  No confusion, No seizures, No syncope, No tremor, No speech change, No headache, No hiccups, No abnormal gait, No sensory changes, No weakness. Psychiatric:  No depression, No anxiety, Concentration normal.  Endocrine:  No polyuria, No polydipsia, No hot flashes, No thyroid symptoms. Hematologic:  No epistaxis, No gingival bleeding, No petechiae, No ecchymosis. Lymphatic:  No lymphadenopathy, No lymphedema. Allergy / Immunologic:  No eczema, No frequent mucous infections, No frequent respiratory infections, No recurrent urticarial, No frequent skin infections.      Vital Signs: BP (!) 158/86 (Site: Right Upper Arm, Position: Sitting, Cuff Size: Medium Adult)   Pulse 75   Temp 97.5 °F (36.4 °C) (Temporal)   Resp 18   Ht 5' 3\" (1.6 m)   Wt 152 lb 9.6 oz (69.2 kg)   LMP 10/02/2012   SpO2 96%   BMI 27.03 kg/m²      Physical Exam:  CONSTITUTIONAL: awake, alert, cooperative, no apparent distress   EYES: pupils equal, round and reactive to light, sclera clear, normal conjunctiva  ENT: Normocephalic, without obvious abnormality, atraumatic  NECK: supple, symmetrical, no jugular venous distension, no carotid bruits   HEMATOLOGIC/LYMPHATIC: no cervical, supraclavicular or axillary lymphadenopathy   LUNGS: no increased work of breathing, clear to auscultation   CARDIOVASCULAR: regular rate and rhythm, normal S1 and S2, no murmur noted  ABDOMEN: normal bowel sounds x 4, soft, non-distended, non-tender, no masses palpated, no hepatosplenomegaly   MUSCULOSKELETAL: full range of motion noted, tone is normal  NEUROLOGIC: awake, alert, oriented to name, place and time. Motor skills grossly intact. SKIN: appears intact, normal skin color, normal texture, normal turgor, no jaundice.    EXTREMITIES: no LE edema      Labs:  Hematology:  Lab Results   Component Value Date    WBC 4.7 02/11/2022    RBC 3.89 (L) 02/11/2022    HGB 11.4 (L) 02/11/2022    HCT 36.5 (L) 02/11/2022    MCV 93.8 02/11/2022    MCH 29.3 02/11/2022    MCHC 31.2 (L) 02/11/2022    RDW 13.2 02/11/2022     02/11/2022    MPV 9.3 02/11/2022    SEGSPCT 65.0 02/11/2022    EOSRELPCT 1.9 02/11/2022    BASOPCT 0.4 02/11/2022    LYMPHOPCT 25.2 02/11/2022    MONOPCT 7.5 (H) 02/11/2022    SEGSABS 3.1 02/11/2022    EOSABS 0.1 02/11/2022    BASOSABS 0.0 02/11/2022    LYMPHSABS 1.2 02/11/2022    MONOSABS 0.4 02/11/2022    DIFFTYPE AUTOMATED DIFFERENTIAL 02/11/2022     Lab Results   Component Value Date    ESR 1 02/11/2022     Chemistry:  Lab Results   Component Value Date     02/07/2022    K 4.7 02/07/2022     02/07/2022    CO2 25 02/07/2022    BUN 11 02/07/2022    CREATININE 0.8 02/07/2022    GLUCOSE 73 02/07/2022    CALCIUM 8.8 02/07/2022    PROT 6.4 02/07/2022    LABALBU 3.7 02/07/2022    BILITOT 0.2 02/07/2022    ALKPHOS 116 02/07/2022    AST 14 (L) 02/07/2022    ALT 10 02/07/2022    LABGLOM >60 02/07/2022    GFRAA >60 02/07/2022    PHOS 3.7 10/21/2016    MG 1.8 10/21/2016     Lab Results   Component Value Date     02/11/2022     No components found for: LD  Lab Results Component Value Date    TSHHS 2.050 10/21/2016    T4FREE 1.07 10/21/2016     Immunology:  Lab Results   Component Value Date    PROT 6.4 02/07/2022     No results found for: Abdiel Oconnor, KLFLCR  No results found for: B2M  Coagulation Panel:  No results found for: PROTIME, INR, APTT, DDIMER  Anemia Panel:  Lab Results   Component Value Date    HWZYIEKV38 354.6 02/11/2022    FOLATE 5.1 02/11/2022     Tumor Markers:  Lab Results   Component Value Date     63 (H) 02/11/2022    CEA 1.4 02/11/2022        Observations:  PHQ-9 Total Score: 0 (2/11/2022  8:42 AM)       Assessment   Low-grade appendiceal mucinous neoplasm (LAMN)    Plan:                                                                                                                                      Vivian AMRITA Maldonado is a 64-year-old very pleasant female who initially presented on 1/24/2022 with worsening abdominal pain. Exploratory laparotomy on 1/24/2022 showed hemoperitoneum secondary to torsion & ischemic right adnexa and abnormal appendix with mucinous growth at the tip of the appendix. She is status post right oophorectomy, appendicectomy and abdominal washout. 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 Tumor size was 2.9 x 2 x 1 cm. Acellular mucin invades subserosa or mesoappendix but does not extend to the serosal surface. All margins negative for noninvasive tumor and mucin. Proximal mucosal margin uninvolved and approximately at least 5 cm away. Mesenteric margins is also uninvolved and approximately 1.5 cm away. There is no lymphovascular invasion, perineural invasion or tumor deposits [pT3 (LAMN with acellular mucin or mucinous epithelium that extends into the subserosa), pN0]. Since she only has pT3 low grade appendicular neoplasia and she is status post appendicectomy with negative surgical margins, I recommend for close observation. She doesn't need additional surgery.     I recommend to check CEA, CA 19-9 and  level. I will plan to routinely repeat these tumor marker in future to monitor disease progression. Colorectal cancer and other tumors involving the gastrointestinal tract, endometrium, ovary, breast, and kidney have been reported in patients with appendiceal mucinous lesions. I agree with having colonoscopy and I asked her to have it as planned. I will continue to follow her and make sure that she has regular mammogram and regular follow up with gynecologist.     Flakita Bentoner her case with Dr. Xiomara Tobias. I answered all her questions and concerns for today. I asked her to follow up with primary care physician on regular basis. I will continue to keep you updated on her progress. Thank you for allowing me to participate in the care of this very pleasant patient. Recent imaging and labs were reviewed and discussed with the patient.

## 2022-02-17 LAB — CA 19-9: 8 U/ML (ref 0–37)

## 2022-02-19 ENCOUNTER — HOSPITAL ENCOUNTER (EMERGENCY)
Age: 42
Discharge: HOME | End: 2022-02-19
Attending: EMERGENCY MEDICINE
Payer: COMMERCIAL

## 2022-02-19 VITALS
DIASTOLIC BLOOD PRESSURE: 96 MMHG | RESPIRATION RATE: 18 BRPM | OXYGEN SATURATION: 99 % | WEIGHT: 150 LBS | HEIGHT: 63 IN | HEART RATE: 95 BPM | BODY MASS INDEX: 26.58 KG/M2 | TEMPERATURE: 98.4 F | SYSTOLIC BLOOD PRESSURE: 158 MMHG

## 2022-02-19 DIAGNOSIS — N39.0 URINARY TRACT INFECTION WITH HEMATURIA, SITE UNSPECIFIED: Primary | ICD-10-CM

## 2022-02-19 DIAGNOSIS — R31.9 URINARY TRACT INFECTION WITH HEMATURIA, SITE UNSPECIFIED: Primary | ICD-10-CM

## 2022-02-19 LAB
BACTERIA: NEGATIVE /HPF
BILIRUBIN URINE: NEGATIVE MG/DL
BLOOD, URINE: ABNORMAL
CLARITY: ABNORMAL
COLOR: ABNORMAL
GLUCOSE, URINE: 100 MG/DL
HYALINE CASTS: 0 /LPF
KETONES, URINE: NEGATIVE MG/DL
LEUKOCYTE ESTERASE, URINE: ABNORMAL
MUCUS: ABNORMAL HPF
NITRITE URINE, QUANTITATIVE: POSITIVE
PH, URINE: 5 (ref 5–8)
PROTEIN UA: 100 MG/DL
RBC URINE: 6 /HPF (ref 0–6)
SPECIFIC GRAVITY UA: 1.01 (ref 1–1.03)
SQUAMOUS EPITHELIAL: <1 /HPF
TRICHOMONAS: ABNORMAL /HPF
UROBILINOGEN, URINE: 0.2 MG/DL (ref 0.2–1)
WBC CLUMP: ABNORMAL /HPF
WBC UA: 95 /HPF (ref 0–5)

## 2022-02-19 PROCEDURE — 99284 EMERGENCY DEPT VISIT MOD MDM: CPT

## 2022-02-19 PROCEDURE — 6370000000 HC RX 637 (ALT 250 FOR IP): Performed by: EMERGENCY MEDICINE

## 2022-02-19 PROCEDURE — 81001 URINALYSIS AUTO W/SCOPE: CPT

## 2022-02-19 RX ORDER — HYDROCODONE BITARTRATE AND ACETAMINOPHEN 5; 325 MG/1; MG/1
1 TABLET ORAL ONCE
Status: COMPLETED | OUTPATIENT
Start: 2022-02-19 | End: 2022-02-19

## 2022-02-19 RX ORDER — CEPHALEXIN 250 MG/1
500 CAPSULE ORAL ONCE
Status: COMPLETED | OUTPATIENT
Start: 2022-02-19 | End: 2022-02-19

## 2022-02-19 RX ORDER — CEPHALEXIN 500 MG/1
500 CAPSULE ORAL 2 TIMES DAILY
Qty: 20 CAPSULE | Refills: 0 | Status: SHIPPED | OUTPATIENT
Start: 2022-02-19 | End: 2022-03-01

## 2022-02-19 RX ORDER — PHENAZOPYRIDINE HYDROCHLORIDE 200 MG/1
200 TABLET, FILM COATED ORAL 3 TIMES DAILY PRN
Qty: 6 TABLET | Refills: 0 | Status: SHIPPED | OUTPATIENT
Start: 2022-02-19 | End: 2022-02-21

## 2022-02-19 RX ADMIN — CEPHALEXIN 500 MG: 250 CAPSULE ORAL at 21:58

## 2022-02-19 RX ADMIN — HYDROCODONE BITARTRATE AND ACETAMINOPHEN 1 TABLET: 5; 325 TABLET ORAL at 21:58

## 2022-02-19 ASSESSMENT — ENCOUNTER SYMPTOMS
EYE DISCHARGE: 0
SORE THROAT: 0
NAUSEA: 0
SHORTNESS OF BREATH: 0
COUGH: 0
RHINORRHEA: 0
EYE PAIN: 0
BACK PAIN: 0
ABDOMINAL PAIN: 0
VOMITING: 0

## 2022-02-19 ASSESSMENT — PAIN DESCRIPTION - PROGRESSION
CLINICAL_PROGRESSION: GRADUALLY WORSENING
CLINICAL_PROGRESSION: GRADUALLY WORSENING

## 2022-02-19 ASSESSMENT — PAIN DESCRIPTION - LOCATION
LOCATION: VAGINA
LOCATION: VAGINA

## 2022-02-19 ASSESSMENT — PAIN DESCRIPTION - PAIN TYPE
TYPE: ACUTE PAIN
TYPE: ACUTE PAIN

## 2022-02-19 ASSESSMENT — PAIN DESCRIPTION - DESCRIPTORS
DESCRIPTORS: BURNING;CONSTANT
DESCRIPTORS: BURNING;CONSTANT

## 2022-02-19 ASSESSMENT — PAIN - FUNCTIONAL ASSESSMENT: PAIN_FUNCTIONAL_ASSESSMENT: 0-10

## 2022-02-19 ASSESSMENT — PAIN SCALES - GENERAL
PAINLEVEL_OUTOF10: 6
PAINLEVEL_OUTOF10: 7
PAINLEVEL_OUTOF10: 6

## 2022-02-19 ASSESSMENT — PAIN DESCRIPTION - FREQUENCY
FREQUENCY: CONTINUOUS
FREQUENCY: CONTINUOUS

## 2022-02-20 NOTE — ED TRIAGE NOTES
Pt presents to emergency department via POV and is ambulatory without assistance. ABCs are intact. Pt states that \"I have an UTI. \" Pt states she has burning in her vaginal area that is constant. Pt denies any other pain or symptoms. Pt has not taken anything for the pain prior to arrival. Pt denies fever/chills, or flank pain. Pt is calm and does not appear to be in any life-threatening or significant distress at this time. Pt states she came here because all of the urgent cares are closed right now. Pt has had vital signs obtained and has provided a urine sample for urinalysis. Pt is currently sitting in the waiting room in a chair and is awaiting further treatment and evaluation.     Nolvia Connor, KARLENEP

## 2022-02-20 NOTE — ED PROVIDER NOTES
[address] ENCOUNTER      Pt Name: Nida Wilson  MRN: [id number]  Jihangfjodi 1980  Date of evaluation: 2/19/2022  Provider: Tressa Cooley MD    CHIEF COMPLAINT       Chief Complaint   Patient presents with    Urinary Tract Infection         HISTORY OF PRESENT ILLNESS      Nida Wilson is a 35 y.o. female who presents to the emergency department  for   Chief Complaint   Patient presents with    Urinary Tract Infection       35 yof presents with several days of dysuria. He is concerned about the possibility of urinary tract infection. She states she has had a urinary tract infection remotely. She reports that she took some over-the-counter Azo which did not completely help with her symptoms. Denies any remarkable suprapubic abdominal pain. No fevers. No vomiting. No chills. No constitutional infectious symptoms. Denies any other remarkable symptoms. She reports that she attempted to get in with her primary care physician but cannot get an appointment for couple of weeks. GCS of 15 in the emergency department. She does not identify exasperate relieving factors            Nursing Notes, Triage Notes & Vital Signs were reviewed. REVIEW OF SYSTEMS    (2-9 systems for level 4, 10 or more for level 5)     Review of Systems   Constitutional: Negative for chills and fever. HENT: Negative for congestion, rhinorrhea and sore throat. Eyes: Negative for pain and discharge. Respiratory: Negative for cough and shortness of breath. Cardiovascular: Negative for chest pain and palpitations. Gastrointestinal: Negative for abdominal pain, nausea and vomiting. Endocrine: Negative for polydipsia and polyuria. Genitourinary: Positive for dysuria. Negative for flank pain. Musculoskeletal: Negative for back pain and neck pain. Skin: Negative for pallor and wound.    Neurological: Negative for dizziness, facial asymmetry, light-headedness, numbness and headaches. Psychiatric/Behavioral: Negative for confusion. Except as noted above the remainder of the review of systems was reviewed and negative. PAST MEDICAL HISTORY   History reviewed. No pertinent past medical history. Prior to Admission medications    Medication Sig Start Date End Date Taking?  Authorizing Provider   cephALEXin (KEFLEX) 500 MG capsule Take 1 capsule by mouth 2 times daily for 10 days 2/19/22 3/1/22 Yes Marvin Alcantara MD   phenazopyridine (PYRIDIUM) 200 MG tablet Take 1 tablet by mouth 3 times daily as needed for Pain 22 Yes Marvin Alcantara MD   ACETAMINOPHEN PO Take by mouth    Historical Provider, MD   Na Sulfate-K Sulfate-Mg Sulf (SUPREP) 17.5-3.13-1.6 GM/177ML SOLN solution Take 177 mLs by mouth See Admin Instructions 2/10/22   Tobias Michaels PA-C   ferrous sulfate (IRON 325) 325 (65 Fe) MG tablet Take 1 tablet by mouth daily (with breakfast) 22   Tobias Michaels PA-C   calcium carbonate (ANTACID) 500 MG chewable tablet Take 1 tablet by mouth daily 22  Jr Preciado DO        Patient Active Problem List   Diagnosis    Gastroenteritis    Encounter for supervision of normal pregnancy in multigravida    Status post repeat low transverse  section    Ventral hernia    S/P repair of ventral hernia    Abdominal wall mass of epigastric region         SURGICAL HISTORY       Past Surgical History:   Procedure Laterality Date    ABDOMINOPLASTY       SECTION, LOW TRANSVERSE  1997    ENDOMETRIAL ABLATION      GASTRIC BYPASS SURGERY      HYSTERECTOMY      LAPAROTOMY N/A 2022    LAPAROTOMY EXPLORATORY APPENDECTOMY RIGHT OOPHERECTOMY FOR OVARIAN TORSION performed by Eder Patel MD at [address] [place] [address]       Previous Medications    ACETAMINOPHEN PO    Take by mouth    CALCIUM CARBONATE (ANTACID) 500 MG CHEWABLE TABLET    Take 1 tablet by mouth daily    FERROUS SULFATE (IRON 325) 325 (65 FE) MG TABLET    Take 1 tablet by mouth daily (with breakfast)    NA SULFATE-K SULFATE-MG SULF (SUPREP) 17.5-3.13-1.6 GM/177ML SOLN SOLUTION    Take 177 mLs by mouth See Admin Instructions       ALLERGIES     Amoxicillin and Other    FAMILY HISTORY     History reviewed. No pertinent family history. SOCIAL HISTORY       Social History     Socioeconomic History    Marital status:      Spouse name: None    Number of children: None    Years of education: None    Highest education level: None   Occupational History    None   Tobacco Use    Smoking status: Former Smoker     Types: Cigarettes     Quit date: 2009     Years since quittin.8    Smokeless tobacco: Never Used   Substance and Sexual Activity    Alcohol use: No    Drug use: No    Sexual activity: Yes     Partners: Male   Other Topics Concern    None   Social History Narrative    None     Social Determinants of Health     Financial Resource Strain:     Difficulty of Paying Living Expenses: Not on file   Food Insecurity:     Worried About Running Out of Food in the Last Year: Not on file    Darrian of Food in the Last Year: Not on file   Transportation Needs:     Lack of Transportation (Medical): Not on file    Lack of Transportation (Non-Medical):  Not on file   Physical Activity:     Days of Exercise per Week: Not on file    Minutes of Exercise per Session: Not on file   Stress:     Feeling of Stress : Not on file   Social Connections:     Frequency of Communication with Friends and Family: Not on file    Frequency of Social Gatherings with Friends and Family: Not on file    Attends Moravian Services: Not on file    Active Member of Clubs or Organizations: Not on file    Attends Club or Organization Meetings: Not on file    Marital Status: Not on file   Intimate Partner Violence:     Fear of Current or Ex-Partner: Not on file    Emotionally Abused: Not on file    Physically Abused: Not on file    Sexually Abused: Not on file   Housing Stability:     Unable to Pay for Housing in the Last Year: Not on file    Number of [place] in the Last Year: Not on file    Unstable Housing in the Last Year: Not on file       SCREENINGS               PHYSICAL EXAM    (up to 7 for level 4, 8 or more for level 5)     ED Triage Vitals [02/19/22 2051]   BP Temp Temp Source Pulse Resp SpO2 Height Weight   (!) 163/97 98.2 °F (36.8 °C) Oral 104 16 98 % 5' 3\" (1.6 m) 150 lb (68 kg)       Physical Exam  Vitals reviewed. Constitutional:       Appearance: She is not ill-appearing or toxic-appearing. HENT:      Head: Normocephalic and atraumatic. Nose: No congestion or rhinorrhea. Mouth/Throat:      Mouth: Mucous membranes are moist.      Pharynx: No oropharyngeal exudate or posterior oropharyngeal erythema. Eyes:      General:         Right eye: No discharge. Left eye: No discharge. Extraocular Movements: Extraocular movements intact. Pupils: Pupils are equal, round, and reactive to light. Cardiovascular:      Rate and Rhythm: Tachycardia present. Pulses: Normal pulses. Heart sounds: No friction rub. No gallop. Pulmonary:      Effort: Pulmonary effort is normal. No respiratory distress. Chest:      Chest wall: No tenderness. Abdominal:      Palpations: Abdomen is soft. Tenderness: There is no abdominal tenderness. There is no guarding. Comments: Abdomen soft, non-tender, non-peritoneal  No guarding on abdominal exam   Musculoskeletal:         General: No swelling or tenderness. Normal range of motion. Cervical back: Normal range of motion and neck supple. Skin:     General: Skin is warm. Capillary Refill: Capillary refill takes less than 2 seconds. Neurological:      General: No focal deficit present. Mental Status: She is alert and oriented to person, place, and time. presents with several days of dysuria. She is concerned about urinary tract infection. She reports having one normal and her symptoms feel similar. No fevers or chills. No vomiting. She presents with mild tachycardia. Vitals otherwise unremarkable. She is afebrile. Her oxygen saturations are in the high 90s on room air. Blood pressure mildly elevated. Her abdomen is not significantly tender. No remarkable supra pubic tenderness. She has been taking some over-the-counter Azo. Urinalysis is obtained and is nitrate positive and shows pyuria. Is consistent with urinary tract infection. She is treated with pain medication as well as cephalexin in the emergency department. She will prescribe a course of cephalexin for home and follow-up outpatient. She also prescribed. For home. She is discharged in stable condition. Strict return precautions for worsening concerning symptoms are discussed       Amount and/or Complexity of Data Reviewed  Clinical lab tests: reviewed        -  Patient seen and evaluated in the emergency department. -  Triage and nursing notes reviewed and incorporated. -  Old chart records reviewed and incorporated. -  Work-up included:  See above  -  Results discussed with patient. CONSULTS:  None    PROCEDURES:  None performed unless otherwise noted below     Procedures        FINAL IMPRESSION      1.  Urinary tract infection with hematuria, site unspecified          DISPOSITION/PLAN   DISPOSITION Discharge - Pending Orders Complete 02/19/2022 09:50:22 PM      PATIENT REFERRED TO:  Link Salomon MD  [address] [facility] [address] [place] [address] [phone]    Schedule an appointment as soon as possible for a visit in 3 days        DISCHARGE MEDICATIONS:  New Prescriptions    CEPHALEXIN (KEFLEX) 500 MG CAPSULE    Take 1 capsule by mouth 2 times daily for 10 days    PHENAZOPYRIDINE (PYRIDIUM) 200 MG TABLET    Take 1 tablet by mouth 3 times daily as needed for Pain       ED Provider Disposition Time  DISPOSITION Discharge - Pending Orders Complete 02/19/2022 09:50:22 PM      Appropriate personal protective equipment was worn during the patient's evaluation. These included surgical, eye protection, surgical mask or in 95 respirator and gloves. The patient was also placed in a surgical mask for the prevention of possible spread of respiratory viral illnesses. The Patient was instructed to read the package inserts with any medication that was prescribed. Major potential reactions and medication interactions were discussed. The Patient understands that there are numerous possible adverse reactions not covered. The patient was also instructed to arrange follow-up with his or her primary care provider for review of any pending labwork or incidental findings on any radiology results that were obtained. All efforts were made to discuss any incidental findings that require further monitoring. Controlled Substances Monitoring:     No flowsheet data found.     (Please note that portions of this note were completed with a voice recognition program.  Efforts were made to edit the dictations but occasionally words are mis-transcribed.)    Clarita No MD (electronically signed)  Attending Emergency Physician           Clarita No MD  02/19/22 [id number]

## 2022-02-20 NOTE — ED NOTES
Reviewed discharge information. Patient verbalized understanding of paperwork, medication, and care instructions. Patient denied any questions.      Amber Marie RN  02/19/22 9643

## 2022-02-21 ENCOUNTER — OFFICE VISIT (OUTPATIENT)
Dept: SURGERY | Age: 42
End: 2022-02-21

## 2022-02-21 ENCOUNTER — TELEPHONE (OUTPATIENT)
Dept: SURGERY | Age: 42
End: 2022-02-21

## 2022-02-21 ENCOUNTER — HOSPITAL ENCOUNTER (OUTPATIENT)
Age: 42
Setting detail: SPECIMEN
Discharge: HOME | End: 2022-02-21
Payer: COMMERCIAL

## 2022-02-21 VITALS
WEIGHT: 151 LBS | HEART RATE: 94 BPM | SYSTOLIC BLOOD PRESSURE: 142 MMHG | HEIGHT: 63 IN | BODY MASS INDEX: 26.75 KG/M2 | DIASTOLIC BLOOD PRESSURE: 98 MMHG | OXYGEN SATURATION: 97 %

## 2022-02-21 DIAGNOSIS — Z48.89 ENCOUNTER FOR POSTOPERATIVE CARE: ICD-10-CM

## 2022-02-21 DIAGNOSIS — Z01.818 PRE-OP TESTING: ICD-10-CM

## 2022-02-21 DIAGNOSIS — D37.3 LOW GRADE MUCINOUS NEOPLASM OF APPENDIX: Primary | ICD-10-CM

## 2022-02-21 PROCEDURE — 99024 POSTOP FOLLOW-UP VISIT: CPT | Performed by: PHYSICIAN ASSISTANT

## 2022-02-21 PROCEDURE — U0005 INFEC AGEN DETEC AMPLI PROBE: HCPCS

## 2022-02-21 PROCEDURE — APPNB30 APP NON BILLABLE TIME 0-30 MINS: Performed by: PHYSICIAN ASSISTANT

## 2022-02-21 PROCEDURE — U0003 INFECTIOUS AGENT DETECTION BY NUCLEIC ACID (DNA OR RNA); SEVERE ACUTE RESPIRATORY SYNDROME CORONAVIRUS 2 (SARS-COV-2) (CORONAVIRUS DISEASE [COVID-19]), AMPLIFIED PROBE TECHNIQUE, MAKING USE OF HIGH THROUGHPUT TECHNOLOGIES AS DESCRIBED BY CMS-2020-01-R: HCPCS

## 2022-02-21 ASSESSMENT — PATIENT HEALTH QUESTIONNAIRE - PHQ9
SUM OF ALL RESPONSES TO PHQ9 QUESTIONS 1 & 2: 0
1. LITTLE INTEREST OR PLEASURE IN DOING THINGS: 0
2. FEELING DOWN, DEPRESSED OR HOPELESS: 0
SUM OF ALL RESPONSES TO PHQ QUESTIONS 1-9: 0

## 2022-02-21 NOTE — PROGRESS NOTES
Chief Complaint   Patient presents with    Post-Op Check     2nd p/o-- EXP LAP W/ RT OOPHORECTOMY AND APPY, ABD WASHOUT @ [facility] 22 - DISCUSS COLONSCOPY         SUBJECTIVE:  Patient presents today for her 2nd post op visit following ex lap with R oophorectomy, appendectomy and abdominal wash out. Pt reports that pain is none. Pt is  tolerating a regular diet. BMs are WNL. Incisions: well healed. Pt has seen Dr. Taz Fuller, who is recommending colonoscopy as well as monitoring. At this time, colon resection is not needed. Past Surgical History:   Procedure Laterality Date    ABDOMINOPLASTY       SECTION, LOW TRANSVERSE  1997    ENDOMETRIAL ABLATION      GASTRIC BYPASS SURGERY      HYSTERECTOMY      LAPAROTOMY N/A 2022    LAPAROTOMY EXPLORATORY APPENDECTOMY RIGHT OOPHERECTOMY FOR OVARIAN TORSION performed by Deanna Desai MD at [address] [facility]       No past medical history on file. No family history on file.   Social History     Socioeconomic History    Marital status:      Spouse name: Not on file    Number of children: Not on file    Years of education: Not on file    Highest education level: Not on file   Occupational History    Not on file   Tobacco Use    Smoking status: Former Smoker     Types: Cigarettes     Quit date: 2009     Years since quittin.8    Smokeless tobacco: Never Used   Vaping Use    Vaping Use: Never used   Substance and Sexual Activity    Alcohol use: Yes     Comment: occ    Drug use: No    Sexual activity: Yes     Partners: Male   Other Topics Concern    Not on file   Social History Narrative    Not on file     Social Determinants of Health     Financial Resource Strain:     Difficulty of Paying Living Expenses: Not on file   Food Insecurity:     Worried About [address] in the Last Year: Not on file    Darrian of Food in the Last Year: Not on file   Transportation Needs:     Lack of Transportation (Medical): Not on file    Lack of Transportation (Non-Medical): Not on file   Physical Activity:     Days of Exercise per Week: Not on file    Minutes of Exercise per Session: Not on file   Stress:     Feeling of Stress : Not on file   Social Connections:     Frequency of Communication with Friends and Family: Not on file    Frequency of Social Gatherings with Friends and Family: Not on file    Attends Taoist Services: Not on file    Active Member of [address] [place] [address] or Organizations: Not on file    Attends Club or Organization Meetings: Not on file    Marital Status: Not on file   Intimate Partner Violence:     Fear of Current or Ex-Partner: Not on file    Emotionally Abused: Not on file    Physically Abused: Not on file    Sexually Abused: Not on file   Housing Stability:     Unable to Pay for Housing in the Last Year: Not on file    Number of [place] in the Last Year: Not on file    Unstable Housing in the Last Year: Not on file       OBJECTIVE:  Physical Exam  Constitutional:       Appearance: She is well-developed. HENT:      Head: Normocephalic. Eyes:      Pupils: Pupils are equal, round, and reactive to light. Cardiovascular:      Rate and Rhythm: Normal rate. Pulmonary:      Effort: Pulmonary effort is normal.   Abdominal:      General: There is no distension. Palpations: Abdomen is soft. There is no mass. Tenderness: There is no abdominal tenderness. There is no guarding or rebound. Comments: Midline incision well healed. Some scab still present by umbilicus. Musculoskeletal:         General: Normal range of motion. Cervical back: Normal range of motion and neck supple. Skin:     General: Skin is warm. Neurological:      Mental Status: She is alert and oriented to person, place, and time. ASSESSMENT:  Patient doing well on this post operative check. Wounds well healed. Will plan for diagnostic colonoscopy soon.      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 Encounter for postoperative care    3. Pre-op testing        PLAN:  Discussed procedure with pt, who has elected to proceed with diagnostic colonoscopy. Already has prep at home. Pt is to increase activities as tolerated. Orders Placed This Encounter   Procedures    Covid-19 Ambulatory        No orders of the defined types were placed in this encounter. Follow Up: Return for Colonoscopy follow-up.     Beni Peters PA-C

## 2022-02-21 NOTE — PATIENT INSTRUCTIONS
Pre-Procedure COVID-19 Self Testing  Quarantine Instructions  Day of Surgery Instructions         What to do before my surgery:    All patients scheduled for elective surgery must test for COVID19 72-96 hours prior to the surgery date.  Pre-Procedure COVID-19 Self-Test will be scheduled for you by your provider.  You can receive your Pre-Procedure COVID-19 Self-Test at:  [facility]. [address] [facility], [facility], [facility], [address] [facility]   If you do not have the COVID-19 test we will cancel or reschedule your procedure   Once you test you must quarantine at home until after your procedure with only your immediate family members or whoever lives with you.  If you must work during your quarantine period, we ask that you continue to practice social distancing, wear a mask that covers your mouth and nose and perform all hand hygiene as recommended by the CDC.  If you must go to the grocery, etc. and cannot get someone to do this for you please wear a mask that covers your mouth and nose and perform all hand hygiene as recommended by the CDC.  Your surgeon's office will notify you with any concerns about your test result. What can I expect on the day of surgery?  Arrive at the time the office or hospital staff tell you on the day of your procedure.  Wear a mask when entering the hospital.     A member of the hospital staff will take your temperature and ask you a few questions as you enter the building.  In abundance of caution for the safety of all our patients and staff, please follow all hospital visitor guidelines in place at the time of your procedure. The staff caring for you will stay in close communication with your loved one and keep them updated on progress.  Please provide a phone number for us to use when communicating with your family or ride home.    When you are ready to discharge, we will notify your family/person with you to bring the car to the front entrance. We will take you to them after you receive all of your discharge instructions.

## 2022-02-21 NOTE — TELEPHONE ENCOUNTER
SPOKE TO  [facility] ([address] [facility]) SCHEDULED @ [facility].  NOTIFIED OF DATES, TIMES AND LOCATION    PHONE ASSESSMENT   COVID - 2/21/22 IN OFFICE  SURGERY - 2/28/22 1100  P/O - 4/10/22 @ 300    NPO AFTER MIDNIGHT  SUPREP  CLEAR LIQUIDS 2/27/22  1ST BOTTLE 4PM  2ND BOTTLE 6AM  HOLD BLOOD THINNERS - NOT TAKING

## 2022-02-22 LAB
SARS-COV-2: NOT DETECTED
SOURCE: NORMAL

## 2022-02-27 RX ORDER — SOD SULF/POT CHLORIDE/MAG SULF 1.479 G
24 TABLET ORAL ONCE
Qty: 24 TABLET | Refills: 0 | Status: SHIPPED | OUTPATIENT
Start: 2022-02-27 | End: 2022-02-27

## 2022-03-02 ENCOUNTER — TELEPHONE (OUTPATIENT)
Dept: SURGERY | Age: 42
End: 2022-03-02

## 2022-03-02 NOTE — TELEPHONE ENCOUNTER
LEFT MESSAGE FOR PT REGARDING RESCHEDULED PROCEDURE DATE    SURGERY - 3/16/22 @ [address] [facility])  COVID - 3/11/22 @ 730  PO- 3/24/22 @ 915    PT IS TO  SUTAB IN OFFICE  1ST DOSE 4PM  2ND DOSE 9AM

## 2022-03-10 NOTE — PROGRESS NOTES
Patient will arrive at 1200 at [facility] on 3/16/2022 for her procedure at 1400. 1. Do not eat or drink anything after midnight - unless instructed by your doctor prior to surgery. This includes                   no water, chewing gum or mints. 2. Follow your directions as prescribed by the doctor for your procedure and medications. 3. Check with your Doctor regarding stopping vitamins, supplements, blood thinners (Plavix, Coumadin, Lovenox, Effient, Pradaxa, Xarelto, Fragmin or                   other blood thinners) and follow their instructions. 4. Do not smoke, and do not drink any alcoholic beverages 24 hours prior to surgery. This includes NA Beer. 5. You may brush your teeth and gargle the morning of surgery. DO NOT SWALLOW WATER   6. You MUST make arrangements for a responsible adult to take you home after your surgery and be able to check on you every couple                   hours for the day. You will not be allowed to leave alone or drive yourself home. It is strongly suggested someone stay with you the first 24                   hrs. Your surgery will be cancelled if you do not have a ride home. 7. Please wear simple, loose fitting clothing to the hospital.  Kunal Slater not bring valuables (money, credit cards, checkbooks, etc.) Do not wear any                   makeup (including no eye makeup) or nail polish on your fingers or toes. 8. DO NOT wear any jewelry or piercings on day of surgery. All body piercing jewelry must be removed. 9. If you have dentures, they will be removed before going to the OR; we will provide you a container. If you wear contact lenses or glasses,                  they will be removed; please bring a case for them. 10. If you  have a Living Will and Durable Power of  for Healthcare, please bring in a copy.            11. Please bring picture ID,  insurance card, paperwork from the doctors office    (H & P, Consent, & card for implantable devices). 12. Take a shower the night before or morning of your procedure with Hibiclens or an anti-bacterial soap. Do not apply any deodorant, lotion, oil or powder.

## 2022-03-11 NOTE — PROGRESS NOTES
Patient called me back and stated \" that she will be able to arrive at 1130 for her procedure at 1330. \"

## 2022-03-11 NOTE — PROGRESS NOTES
Left message with arrival time change of  1130 at [facility] on 3/16/2022 for her procedure at 1330. Ask patient to call me back and let me know if she can come at that time.

## 2022-03-15 ENCOUNTER — ANESTHESIA EVENT (OUTPATIENT)
Dept: ENDOSCOPY | Age: 42
End: 2022-03-15
Payer: COMMERCIAL

## 2022-03-15 NOTE — ANESTHESIA PRE PROCEDURE
Department of Anesthesiology  Preprocedure Note       Name:  Sanjuana Lagos   Age:  35 y.o.  :  1980                                          MRN:  [id number]         Date:  3/15/2022      Surgeon: Kylah Garcia):  Dafne Mathis MD    Procedure: Procedure(s):  COLONOSCOPY DIAGNOSTIC    Medications prior to admission:   Prior to Admission medications    Medication Sig Start Date End Date Taking? Authorizing Provider   Pseudoephedrine HCl (SUDAFED 12 HOUR PO) Take by mouth as needed    Historical Provider, MD   ACETAMINOPHEN PO Take by mouth    Historical Provider, MD   Na Sulfate-K Sulfate-Mg Sulf (SUPREP) 17.5-3.13-1.6 GM/177ML SOLN solution Take 177 mLs by mouth See Admin Instructions  Patient not taking: Reported on 2022 2/10/22   Gisele Obrien PA-C   ferrous sulfate (IRON 325) 325 (65 Fe) MG tablet Take 1 tablet by mouth daily (with breakfast) 22   Gisele Obrien PA-C       Current medications:    Current Outpatient Medications   Medication Sig Dispense Refill    Pseudoephedrine HCl (SUDAFED 12 HOUR PO) Take by mouth as needed      ACETAMINOPHEN PO Take by mouth      Na Sulfate-K Sulfate-Mg Sulf (SUPREP) 17.5-3.13-1.6 GM/177ML SOLN solution Take 177 mLs by mouth See Admin Instructions (Patient not taking: Reported on 2022) 2 each 0    ferrous sulfate (IRON 325) 325 (65 Fe) MG tablet Take 1 tablet by mouth daily (with breakfast) 30 tablet 0     No current facility-administered medications for this visit. Allergies:     Allergies   Allergen Reactions    Amoxicillin Hives    Adhesive Tape      rash    Other Itching and Rash       Problem List:    Patient Active Problem List   Diagnosis Code    Gastroenteritis K52.9    Encounter for supervision of normal pregnancy in multigravida Z34.80    Status post repeat low transverse  section Z98.891    Ventral hernia K43.9    S/P repair of ventral hernia Z98.890, Z87.19    Abdominal wall mass of epigastric region R19.06       Past Medical History:        Diagnosis Date    UTI (urinary tract infection)        Past Surgical History:        Procedure Laterality Date    ABDOMINOPLASTY       SECTION, LOW TRANSVERSE  1997    ENDOMETRIAL ABLATION      GASTRIC BYPASS SURGERY      HYSTERECTOMY      LAPAROTOMY N/A 2022    LAPAROTOMY EXPLORATORY APPENDECTOMY RIGHT OOPHERECTOMY FOR OVARIAN TORSION performed by Huntre Montanez MD at [facility]         Social History:    Social History     Tobacco Use    Smoking status: Former Smoker     Types: Cigarettes     Quit date: 2009     Years since quittin.8    Smokeless tobacco: Never Used   Substance Use Topics    Alcohol use: Yes     Comment: occ                                Counseling given: Not Answered      Vital Signs (Current): There were no vitals filed for this visit.                                            BP Readings from Last 3 Encounters:   22 (!) 142/98   22 (!) 158/96   22 (!) 158/86       NPO Status:                                                                                 BMI:   Wt Readings from Last 3 Encounters:   22 151 lb (68.5 kg)   22 150 lb (68 kg)   22 152 lb 9.6 oz (69.2 kg)     There is no height or weight on file to calculate BMI.    CBC:   Lab Results   Component Value Date    WBC 4.7 2022    RBC 3.89 2022    HGB 11.4 2022    HCT 36.5 2022    MCV 93.8 2022    RDW 13.2 2022     2022       CMP:   Lab Results   Component Value Date     2022    K 4.7 2022     2022    CO2 25 2022    BUN 11 2022    CREATININE 0.8 2022    GFRAA >60 2022    LABGLOM >60 2022    GLUCOSE 73 2022    PROT 6.4 2022    CALCIUM 8.8 2022    BILITOT 0.2 2022    ALKPHOS 116 2022    AST 14 2022    ALT 10 2022       POC Tests: No results for input(s): POCGLU, POCNA, POCK, POCCL, POCBUN, POCHEMO, POCHCT in the last 72 hours. Coags: No results found for: PROTIME, INR, APTT    HCG (If Applicable):   Lab Results   Component Value Date    PREGTESTUR neg 11/13/2012    PREGTESTUR NEGATIVE 11/13/2012        ABGs: No results found for: PHART, PO2ART, ITB3THE, IOW5ZSP, BEART, D8IXULSD     Type & Screen (If Applicable):  No results found for: LABABO, LABRH    Drug/Infectious Status (If Applicable):  No results found for: HIV, HEPCAB    COVID-19 Screening (If Applicable):   Lab Results   Component Value Date    COVID19 NOT DETECTED 02/21/2022           Anesthesia Evaluation  Patient summary reviewed  Airway: Mallampati: II        Dental:          Pulmonary: breath sounds clear to auscultation                            ROS comment: Former smoker   Cardiovascular:  Exercise tolerance: good (>4 METS),           Rhythm: regular             Beta Blocker:  Not on Beta Blocker         Neuro/Psych:   Negative Neuro/Psych ROS              GI/Hepatic/Renal:   (+) hiatal hernia, bowel prep,          ROS comment: S/p GBP    [place]. Endo/Other:    (+) blood dyscrasia: anemia:., .                 Abdominal:   (+) obese,           Vascular: negative vascular ROS. Other Findings:             Anesthesia Plan      MAC     ASA 2       Induction: intravenous. Anesthetic plan and risks discussed with patient. Plan discussed with TIM.                 PAM Moody - TIM   3/15/2022

## 2022-03-16 ENCOUNTER — HOSPITAL ENCOUNTER (OUTPATIENT)
Age: 42
Setting detail: OUTPATIENT SURGERY
Discharge: HOME | End: 2022-03-16
Attending: SURGERY | Admitting: SURGERY
Payer: COMMERCIAL

## 2022-03-16 ENCOUNTER — ANESTHESIA (OUTPATIENT)
Dept: ENDOSCOPY | Age: 42
End: 2022-03-16
Payer: COMMERCIAL

## 2022-03-16 VITALS
HEART RATE: 76 BPM | BODY MASS INDEX: 26.58 KG/M2 | SYSTOLIC BLOOD PRESSURE: 139 MMHG | DIASTOLIC BLOOD PRESSURE: 93 MMHG | OXYGEN SATURATION: 100 % | TEMPERATURE: 97 F | WEIGHT: 150 LBS | HEIGHT: 63 IN | RESPIRATION RATE: 16 BRPM

## 2022-03-16 VITALS — DIASTOLIC BLOOD PRESSURE: 99 MMHG | SYSTOLIC BLOOD PRESSURE: 161 MMHG | OXYGEN SATURATION: 100 %

## 2022-03-16 PROCEDURE — 7100000011 HC PHASE II RECOVERY - ADDTL 15 MIN: Performed by: SURGERY

## 2022-03-16 PROCEDURE — 2709999900 HC NON-CHARGEABLE SUPPLY: Performed by: SURGERY

## 2022-03-16 PROCEDURE — 6360000002 HC RX W HCPCS

## 2022-03-16 PROCEDURE — 2500000003 HC RX 250 WO HCPCS

## 2022-03-16 PROCEDURE — 7100000010 HC PHASE II RECOVERY - FIRST 15 MIN: Performed by: SURGERY

## 2022-03-16 PROCEDURE — 3609027000 HC COLONOSCOPY: Performed by: SURGERY

## 2022-03-16 PROCEDURE — 3700000000 HC ANESTHESIA ATTENDED CARE: Performed by: SURGERY

## 2022-03-16 PROCEDURE — 3700000001 HC ADD 15 MINUTES (ANESTHESIA): Performed by: SURGERY

## 2022-03-16 PROCEDURE — 45378 DIAGNOSTIC COLONOSCOPY: CPT | Performed by: SURGERY

## 2022-03-16 PROCEDURE — 2580000003 HC RX 258: Performed by: ANESTHESIOLOGY

## 2022-03-16 RX ORDER — SODIUM CHLORIDE, SODIUM LACTATE, POTASSIUM CHLORIDE, CALCIUM CHLORIDE 600; 310; 30; 20 MG/100ML; MG/100ML; MG/100ML; MG/100ML
INJECTION, SOLUTION INTRAVENOUS CONTINUOUS
Status: DISCONTINUED | OUTPATIENT
Start: 2022-03-16 | End: 2022-03-16 | Stop reason: HOSPADM

## 2022-03-16 RX ORDER — LIDOCAINE HYDROCHLORIDE 20 MG/ML
INJECTION, SOLUTION EPIDURAL; INFILTRATION; INTRACAUDAL; PERINEURAL PRN
Status: DISCONTINUED | OUTPATIENT
Start: 2022-03-16 | End: 2022-03-16 | Stop reason: SDUPTHER

## 2022-03-16 RX ORDER — PROPOFOL 10 MG/ML
INJECTION, EMULSION INTRAVENOUS PRN
Status: DISCONTINUED | OUTPATIENT
Start: 2022-03-16 | End: 2022-03-16 | Stop reason: SDUPTHER

## 2022-03-16 RX ADMIN — PROPOFOL 450 MG: 10 INJECTION, EMULSION INTRAVENOUS at 12:07

## 2022-03-16 RX ADMIN — LIDOCAINE HYDROCHLORIDE 100 MG: 20 INJECTION, SOLUTION EPIDURAL; INFILTRATION; INTRACAUDAL; PERINEURAL at 12:07

## 2022-03-16 RX ADMIN — SODIUM CHLORIDE, POTASSIUM CHLORIDE, SODIUM LACTATE AND CALCIUM CHLORIDE: 600; 310; 30; 20 INJECTION, SOLUTION INTRAVENOUS at 11:10

## 2022-03-16 ASSESSMENT — ENCOUNTER SYMPTOMS
EYE ITCHING: 0
CONSTIPATION: 0
COLOR CHANGE: 0
ABDOMINAL PAIN: 1
PHOTOPHOBIA: 0
RECTAL PAIN: 0
EYE REDNESS: 0
STRIDOR: 0
ANAL BLEEDING: 0
APNEA: 0
BACK PAIN: 0
CHOKING: 0
SORE THROAT: 0

## 2022-03-16 ASSESSMENT — PAIN SCALES - GENERAL
PAINLEVEL_OUTOF10: 0
PAINLEVEL_OUTOF10: 0

## 2022-03-16 ASSESSMENT — PAIN - FUNCTIONAL ASSESSMENT: PAIN_FUNCTIONAL_ASSESSMENT: 0-10

## 2022-03-16 NOTE — PROGRESS NOTES
1250- patient arrived back to [facility]. Report given to this nurse from [address] Patient A&O x4. Beverage of choice offered to patient. Call light in reach and bed in lowest position, sister at bedside. 1314- IV removed. discharge instructions given to patient and her sister both verbalized understanding. 1320- Patient sitting on side of bed getting dressed sister at bedside. 1325- Patient escorted to car via wheelchair where sister is taking the patient home.

## 2022-03-16 NOTE — PROGRESS NOTES
REPORT RECEIVED FROM CHANG IRAHETA IN SDS. PREOP QUESTIONS, NPO STATUS AND ALLERGIES VERIFIED WITH PT. H/P AND CONSENT COMPLETED. DENIES TAKING BLOOD THINNERS OR BETA BLOCKERS. SISTER AT BEDSIDE.

## 2022-03-16 NOTE — ANESTHESIA POSTPROCEDURE EVALUATION
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        Anesthesia Post Evaluation    Patient location during evaluation: bedside  Patient participation: complete - patient participated  Level of consciousness: awake  Pain score: 0  Airway patency: patent  Nausea & Vomiting: no nausea and no vomiting  Complications: no  Cardiovascular status: blood pressure returned to baseline  Respiratory status: acceptable and room air  Hydration status: euvolemic

## 2022-03-16 NOTE — H&P
Yan Newton MD      General Surgery       Subjective:     Patient is a 35 y.o.  female scheduled for c-scope. Indications for procedure are appendiceal cancer s/p appendectomy. Discussed Blood/Blood Products: yes    Patient Active Problem List    Diagnosis Date Noted    Abdominal wall mass of epigastric region 2015    Ventral hernia 2015    S/P repair of ventral hernia 2015    Status post repeat low transverse  section 2012    Gastroenteritis 2012    Encounter for supervision of normal pregnancy in multigravida 2012     Past Medical History:   Diagnosis Date    UTI (urinary tract infection)       Past Surgical History:   Procedure Laterality Date    ABDOMINOPLASTY       SECTION, LOW TRANSVERSE  1997    ENDOMETRIAL ABLATION      GASTRIC BYPASS SURGERY      HYSTERECTOMY      LAPAROTOMY N/A 2022    LAPAROTOMY EXPLORATORY APPENDECTOMY RIGHT OOPHERECTOMY FOR OVARIAN TORSION performed by Caitlin Newton MD at [address] [place] [address]        Prior to Admission medications    Medication Sig Start Date End Date Taking?  Authorizing Provider   Pseudoephedrine HCl (SUDAFED 12 HOUR PO) Take by mouth as needed    Historical Provider, MD   ACETAMINOPHEN PO Take by mouth    Historical Provider, MD   Na Sulfate-K Sulfate-Mg Sulf (SUPREP) 17.5-3.13-1.6 GM/177ML SOLN solution Take 177 mLs by mouth See Admin Instructions  Patient not taking: Reported on 2022 2/10/22   Jersey Posadas PA-C   ferrous sulfate (IRON 325) 325 (65 Fe) MG tablet Take 1 tablet by mouth daily (with breakfast) 22   Jersey Posadas PA-C     Allergies   Allergen Reactions    Amoxicillin Hives    Adhesive Tape      rash    Other Itching and Rash      Social History     Tobacco Use    Smoking status: Former Smoker     Types: Cigarettes     Quit date: 2009     Years since quittin.8    Smokeless tobacco: Never Used Substance Use Topics    Alcohol use: Yes     Comment: occ      Family History   Problem Relation Age of Onset    Other Mother     Heart Disease Father     Heart Attack Father           Review of Systems  Review of Systems   Constitutional: Negative for chills and fever. HENT: Negative for ear pain, mouth sores, sore throat and tinnitus. Eyes: Negative for photophobia, redness and itching. Respiratory: Negative for apnea, choking and stridor. Cardiovascular: Negative for chest pain and palpitations. Gastrointestinal: Positive for abdominal pain. Negative for anal bleeding, constipation and rectal pain. Endocrine: Negative for polydipsia. Genitourinary: Negative for enuresis, flank pain and hematuria. Musculoskeletal: Negative for back pain, joint swelling and myalgias. Skin: Negative for color change and pallor. Allergic/Immunologic: Negative for environmental allergies. Neurological: Negative for syncope and speech difficulty. Psychiatric/Behavioral: Negative for confusion and hallucinations. Objective:     No data found. Physical Exam  Constitutional:       Appearance: She is well-developed. HENT:      Head: Normocephalic. Eyes:      Pupils: Pupils are equal, round, and reactive to light. Cardiovascular:      Rate and Rhythm: Normal rate. Pulmonary:      Effort: Pulmonary effort is normal.   Abdominal:      General: There is no distension. Palpations: Abdomen is soft. There is no mass. Tenderness: There is no abdominal tenderness. There is no guarding or rebound. Musculoskeletal:         General: Normal range of motion. Cervical back: Normal range of motion and neck supple. Skin:     General: Skin is warm. Neurological:      Mental Status: She is alert and oriented to person, place, and time.          Data Review  CBC:   Lab Results   Component Value Date    WBC 4.7 02/11/2022    RBC 3.89 02/11/2022     BMP:   Lab Results   Component Value Date GLUCOSE 73 02/07/2022    CO2 25 02/07/2022    BUN 11 02/07/2022    CREATININE 0.8 02/07/2022    CALCIUM 8.8 02/07/2022       Assessment:     Appendiceal cancer    Plan:      Will plan for screening c-scope    Victorino Michaels MD

## 2022-03-23 NOTE — PROGRESS NOTES
Patient Name:  Solange Zurita  Patient :  1980  Patient MRN:  [id number]     Primary Oncologist: Chandan Babb MD  Referring Provider: Luis Martin MD     Date of Service: 3/24/2022      Chief Complaint:    Chief Complaint   Patient presents with    Follow-up     Patient Active Problem List:     Gastroenteritis     Encounter for supervision of normal pregnancy in multigravida     Status post repeat low transverse  section     Ventral hernia     S/P repair of ventral hernia     Abdominal wall mass of epigastric region    HPI:   Maxim Reyna. 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 She initially presented to [facility] on 2022 with worsening abdominal pain. She required exploratory laparotomy on 2022. She was found to have hemoperitoneum secondary to torsion and ischemic right adnexa and abnormal appendix with mucinous growth at the tip of the appendix. She is status post right oophorectomy and appendicectomy and abdominal washout. 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 1 small benign mesoappendiceal lymph node. Tumor size was 2.9 x 2 x 1 cm. Acellular mucin invades subserosa or mesoappendix but does not extend to the serosal surface. All margins negative for noninvasive tumor and mucin. Proximal mucosal margin uninvolved and approximately at least 5 cm away. Mesenteric margins is also uninvolved and approximately 1.5 cm away. There is no lymphovascular invasion, perineural invasion or tumor deposits [pT3 (LAMN with acellular mucin or mucinous epithelium that extends into the subserosa), pN0]. Since she is found to have LAMN, she was referred to me for further evaluation.      Since she only has pT3 low grade appendicular mucinous neoplasia and she is status post appendicectomy with negative surgical margins, I recommend for close observation. I recommend to regularly check CEA, CA 19-9 and  level in future to monitor disease progression. Colorectal cancer and other tumors involving the gastrointestinal tract, endometrium, ovary, breast, and kidney have been reported in patients with appendiceal mucinous lesions. Recommend to have screening colonoscopy, regular GYN follow up and mammogram.     Had colonoscopy on 3/16/2022 by Dr. Camila Rg and it only showed internal hemorrhoids. On 2022, she presented to me for follow up. I have been following her for pT3 low-grade mucinous appendiceal neoplasm and she is status post surgery. She has been under close observation since then. Laboratory work-ups on 2022 showed mild elevation in  and iron deficiency. She is going to see Dr. Jie Gao soon and she will need pelvic exam as well as US pelvis to rule out ovarian cancer (she still has one ovary). Also need mammogram. I will follow up with Dr. Nick Tomlinson recommendations. Already had colonoscopy on 3/16/22. Will supplement with FeSO4 and will check her blood tests, including iron study and tumor marker in 6 months. She doesn't have any significant symptoms at today visit. Past Medical History:     Significant for  1. History of obesity status post gastric bypass surgery in 2016    Past Surgery History:    Significant for  1. Gastric bypass surgery in   2. Sinus surgery in   3. Tonsillectomy in   4. Hernia repair in     5. Abdominoplasty in   6.  section in ,  and   7. Endometrial ablation  8. Hysterectomy in   9. Right oophorectomy for right ovarian torsion and necrosis on 2022  10. Appendicectomy for LAMN on 2022    Social History:   She is a former smoker and she quit smoking on 2009.   She socially drinks alcohol and she denies illicit drug abuse. Family History:    Significant for hypertension, hyperlipidemia and mental illness in her mother. Her father has hypertension, diabetes and depression. Allergies:  Amoxicillin  Adhesive tape     Review of Systems: \"Per interval history; otherwise 10 point ROS is negative. \"  Her energy level is good and her sleep is fine. She doesn't have fever, chills, night sweats, cough, shortness of breath, chest pain, hemoptysis or palpitation. Her bowels and bladder functions are normal. She denies nausea, vomiting, abdominal pain, diarrhea, constipation, dysuria, loss of appetite or weight loss. She doesn't have neuropathy and she denies bleeding or clotting issues. She doesn't have any pain on today's visit. Denies anxiety or depression. The rest of the systems are unremarkable. Vital Signs: BP (!) 145/88 (Site: Left Upper Arm, Position: Sitting, Cuff Size: Medium Adult)   Pulse 86   Temp 96.8 °F (36 °C) (Infrared)   Ht 5' 3\" (1.6 m)   Wt 162 lb (73.5 kg)   LMP 10/02/2012   SpO2 100%   BMI 28.70 kg/m²      Physical Exam:  CONSTITUTIONAL: awake, alert, cooperative, no apparent distress   EYES: pupils equal, round and reactive to light, sclera clear, normal conjunctiva  ENT: Normocephalic, without obvious abnormality, atraumatic  NECK: supple, symmetrical, no jugular venous distension, no carotid bruits   HEMATOLOGIC/LYMPHATIC: no cervical, supraclavicular or axillary lymphadenopathy   LUNGS: VBS, no wheezes, no increased work of breathing, no rhonchi, clear to auscultation, no crackles,    CARDIOVASCULAR: regular rate and rhythm, normal S1 and S2, no murmur noted  ABDOMEN: normal bowel sounds x 4, soft, non-distended, non-tender, no masses palpated, no hepatosplenomegaly   MUSCULOSKELETAL: full range of motion noted, tone is normal  NEUROLOGIC: awake, alert, oriented to name, place and time. Motor skills grossly intact.    SKIN: appears intact, normal skin color, normal texture, normal turgor, no jaundice.    EXTREMITIES: no LE edema, no leg swelling, no cyanosis, no clubbing,       Labs:  Hematology:  Lab Results   Component Value Date    WBC 4.7 02/11/2022    RBC 3.89 (L) 02/11/2022    HGB 11.4 (L) 02/11/2022    HCT 36.5 (L) 02/11/2022    MCV 93.8 02/11/2022    MCH 29.3 02/11/2022    MCHC 31.2 (L) 02/11/2022    RDW 13.2 02/11/2022     02/11/2022    MPV 9.3 02/11/2022    SEGSPCT 65.0 02/11/2022    EOSRELPCT 1.9 02/11/2022    BASOPCT 0.4 02/11/2022    LYMPHOPCT 25.2 02/11/2022    MONOPCT 7.5 (H) 02/11/2022    SEGSABS 3.1 02/11/2022    EOSABS 0.1 02/11/2022    BASOSABS 0.0 02/11/2022    LYMPHSABS 1.2 02/11/2022    MONOSABS 0.4 02/11/2022    DIFFTYPE AUTOMATED DIFFERENTIAL 02/11/2022     Lab Results   Component Value Date    ESR 1 02/11/2022     Chemistry:  Lab Results   Component Value Date     02/07/2022    K 4.7 02/07/2022     02/07/2022    CO2 25 02/07/2022    BUN 11 02/07/2022    CREATININE 0.8 02/07/2022    GLUCOSE 73 02/07/2022    CALCIUM 8.8 02/07/2022    PROT 6.4 02/07/2022    LABALBU 3.7 02/07/2022    BILITOT 0.2 02/07/2022    ALKPHOS 116 02/07/2022    AST 14 (L) 02/07/2022    ALT 10 02/07/2022    LABGLOM >60 02/07/2022    GFRAA >60 02/07/2022    PHOS 3.7 10/21/2016    MG 1.8 10/21/2016     Lab Results   Component Value Date     02/11/2022     No components found for: LD  Lab Results   Component Value Date    TSHHS 2.050 10/21/2016    T4FREE 1.07 10/21/2016     Immunology:  Lab Results   Component Value Date    PROT 6.4 02/07/2022     No results found for: [place] Shoulders, KLFLCR  No results found for: B2M  Coagulation Panel:  No results found for: PROTIME, INR, APTT, DDIMER  Anemia Panel:  Lab Results   Component Value Date    OEUKEDON83 354.6 02/11/2022    FOLATE 5.1 02/11/2022     Tumor Markers:  Lab Results   Component Value Date     63 (H) 02/11/2022    CEA 1.4 02/11/2022     8 02/11/2022        Observations:  No data recorded     Assessment   Low-grade appendiceal mucinous neoplasm (LAMN)    Plan:                                                                                                         Vivian Dangelo is a 38-year-old very pleasant female who initially presented on 1/24/2022 with worsening abdominal pain. Exploratory laparotomy on 1/24/2022 showed hemoperitoneum secondary to torsion & ischemic right adnexa and abnormal appendix with mucinous growth at the tip of the appendix. She is status post right oophorectomy, appendicectomy and abdominal washout. 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 Tumor size was 2.9 x 2 x 1 cm. Acellular mucin invades subserosa or mesoappendix but does not extend to the serosal surface. All margins negative for noninvasive tumor and mucin. Proximal mucosal margin uninvolved and approximately at least 5 cm away. Mesenteric margins is also uninvolved and approximately 1.5 cm away. There is no lymphovascular invasion, perineural invasion or tumor deposits [pT3 (LAMN with acellular mucin or mucinous epithelium that extends into the subserosa), pN0]. Since she only has pT3 low grade appendicular mucinous neoplasia and she is status post appendicectomy with negative surgical margins, I recommend for close observation. I recommend to regularly check CEA, CA 19-9 and  level in future to monitor disease progression. Colorectal cancer and other tumors involving the gastrointestinal tract, endometrium, ovary, breast, and kidney have been reported in patients with appendiceal mucinous lesions. Recommend to have screening colonoscopy, regular GYN follow up and mammogram.     Had colonoscopy on 3/16/2022 by Dr. Priscilla Boss and it only showed internal hemorrhoids. On March 24, 2022, she presented to me for follow up. I have been following her for pT3 low-grade mucinous appendiceal neoplasm and she is status post surgery. She has been under close observation since then.     Laboratory work-ups on 2/11/2022 showed mild elevation in  and iron deficiency. She is going to see Dr. Shanelle Isaacs soon and she will need pelvic exam as well as US pelvis to rule out ovarian cancer (she still has one ovary). Also need mammogram. I will follow up with Dr. Alexia Londono recommendations. Already had colonoscopy on 3/16/22. Will supplement with FeSO4 and will check her blood tests, including iron study and tumor marker in 6 months. I answered all her questions and concerns for today. I asked her to follow up with primary care physician on regular basis. Recent imaging and labs were reviewed and discussed with the patient.

## 2022-03-24 ENCOUNTER — OFFICE VISIT (OUTPATIENT)
Dept: SURGERY | Age: 42
End: 2022-03-24
Payer: COMMERCIAL

## 2022-03-24 ENCOUNTER — HOSPITAL ENCOUNTER (OUTPATIENT)
Dept: INFUSION THERAPY | Age: 42
Discharge: HOME | End: 2022-03-24
Payer: COMMERCIAL

## 2022-03-24 ENCOUNTER — OFFICE VISIT (OUTPATIENT)
Dept: ONCOLOGY | Age: 42
End: 2022-03-24
Payer: COMMERCIAL

## 2022-03-24 VITALS
WEIGHT: 161.4 LBS | OXYGEN SATURATION: 97 % | DIASTOLIC BLOOD PRESSURE: 70 MMHG | HEART RATE: 97 BPM | SYSTOLIC BLOOD PRESSURE: 120 MMHG | HEIGHT: 63 IN | BODY MASS INDEX: 28.6 KG/M2

## 2022-03-24 VITALS
SYSTOLIC BLOOD PRESSURE: 145 MMHG | DIASTOLIC BLOOD PRESSURE: 88 MMHG | BODY MASS INDEX: 28.7 KG/M2 | HEIGHT: 63 IN | WEIGHT: 162 LBS | HEART RATE: 86 BPM | OXYGEN SATURATION: 100 % | TEMPERATURE: 96.8 F

## 2022-03-24 DIAGNOSIS — D37.3 LOW GRADE MUCINOUS NEOPLASM OF APPENDIX: ICD-10-CM

## 2022-03-24 DIAGNOSIS — D37.3 LOW GRADE MUCINOUS NEOPLASM OF APPENDIX: Primary | ICD-10-CM

## 2022-03-24 DIAGNOSIS — Z98.890 S/P COLONOSCOPY: Primary | ICD-10-CM

## 2022-03-24 PROCEDURE — 1036F TOBACCO NON-USER: CPT | Performed by: INTERNAL MEDICINE

## 2022-03-24 PROCEDURE — 99214 OFFICE O/P EST MOD 30 MIN: CPT | Performed by: INTERNAL MEDICINE

## 2022-03-24 PROCEDURE — G8427 DOCREV CUR MEDS BY ELIG CLIN: HCPCS | Performed by: PHYSICIAN ASSISTANT

## 2022-03-24 PROCEDURE — G8484 FLU IMMUNIZE NO ADMIN: HCPCS | Performed by: INTERNAL MEDICINE

## 2022-03-24 PROCEDURE — 1036F TOBACCO NON-USER: CPT | Performed by: PHYSICIAN ASSISTANT

## 2022-03-24 PROCEDURE — 99212 OFFICE O/P EST SF 10 MIN: CPT | Performed by: PHYSICIAN ASSISTANT

## 2022-03-24 PROCEDURE — G8484 FLU IMMUNIZE NO ADMIN: HCPCS | Performed by: PHYSICIAN ASSISTANT

## 2022-03-24 PROCEDURE — G8419 CALC BMI OUT NRM PARAM NOF/U: HCPCS | Performed by: INTERNAL MEDICINE

## 2022-03-24 PROCEDURE — G8419 CALC BMI OUT NRM PARAM NOF/U: HCPCS | Performed by: PHYSICIAN ASSISTANT

## 2022-03-24 PROCEDURE — G8427 DOCREV CUR MEDS BY ELIG CLIN: HCPCS | Performed by: INTERNAL MEDICINE

## 2022-03-24 PROCEDURE — 86304 IMMUNOASSAY TUMOR CA 125: CPT

## 2022-03-24 RX ORDER — FERROUS SULFATE 325(65) MG
325 TABLET ORAL
Qty: 30 TABLET | Refills: 5 | Status: SHIPPED | OUTPATIENT
Start: 2022-03-24 | End: 2022-09-20 | Stop reason: SDUPTHER

## 2022-03-24 ASSESSMENT — ENCOUNTER SYMPTOMS
APNEA: 0
RECTAL PAIN: 0
EYE ITCHING: 0
STRIDOR: 0
CONSTIPATION: 0
EYE REDNESS: 0
ANAL BLEEDING: 0
COLOR CHANGE: 0
PHOTOPHOBIA: 0
BACK PAIN: 0
CHOKING: 0
SORE THROAT: 0

## 2022-03-24 NOTE — PROGRESS NOTES
MA Rooming Questions  Patient: Katelyn Watson  MRN: [id number]    Date: 3/24/2022        1. Do you have any new issues?   no         2. Do you need any refills on medications?    no    3. Have you had any imaging done since your last visit?   no    4. Have you been hospitalized or seen in the emergency room since your last visit here?   no    5. Did the patient have a depression screening completed today?  No    No data recorded     PHQ-9 Given to (if applicable):               PHQ-9 Score (if applicable):                     [] Positive     []  Negative              Does question #9 need addressed (if applicable)                     [] Yes    []  No               Vikas Villegas MA

## 2022-03-24 NOTE — PROGRESS NOTES
Chief Complaint   Patient presents with    Follow Up After Procedure     1st f/u cscope 3/16/22       SUBJECTIVE:  HPI: Patient presents for follow up after colonoscopy. Patient is feeling well, denies severe abdominal pain, bloating, rectal bleeding after the procedure. She denies any N/V or issues with her surgical incision. Is scheduled to see Dr. Solorzano See later today, and reports that she is scheduled to see GYN next month.       Past Surgical History:   Procedure Laterality Date    ABDOMINOPLASTY       SECTION, LOW TRANSVERSE  1997    COLONOSCOPY N/A 3/16/2022    COLONOSCOPY DIAGNOSTIC performed by Eder Patel MD at [facility]      HYSTERECTOMY      LAPAROTOMY N/A 2022    LAPAROTOMY EXPLORATORY APPENDECTOMY RIGHT OOPHERECTOMY FOR OVARIAN TORSION performed by Eder Patel MD at [facility]       Past Medical History:   Diagnosis Date    UTI (urinary tract infection)      Family History   Problem Relation Age of Onset    Other Mother     Heart Disease Father     Heart Attack Father      Social History     Socioeconomic History    Marital status: Single     Spouse name: Not on file    Number of children: Not on file    Years of education: Not on file    Highest education level: Not on file   Occupational History    Not on file   Tobacco Use    Smoking status: Former Smoker     Types: Cigarettes     Quit date: 2009     Years since quittin.8    Smokeless tobacco: Never Used   Vaping Use    Vaping Use: Never used   Substance and Sexual Activity    Alcohol use: Yes     Comment: occ    Drug use: No    Sexual activity: Yes     Partners: Male   Other Topics Concern    Not on file   Social History Narrative    Not on file     Social Determinants of Health     Financial Resource Strain:     Difficulty of Paying Living Expenses: Not on file   Food Insecurity:     Worried About Running Out of Food in the Last Year: Not on file    Ran Out of Food in the Last Year: Not on file   Transportation Needs:     Lack of Transportation (Medical): Not on file    Lack of Transportation (Non-Medical): Not on file   Physical Activity:     Days of Exercise per Week: Not on file    Minutes of Exercise per Session: Not on file   Stress:     Feeling of Stress : Not on file   Social Connections:     Frequency of Communication with Friends and Family: Not on file    Frequency of Social Gatherings with Friends and Family: Not on file    Attends [employer] Services: Not on file    Active Member of [address] [place] [address] [employer] or Organizations: Not on file    Attends Club or Organization Meetings: Not on file    Marital Status: Not on file   Intimate Partner Violence:     Fear of Current or Ex-Partner: Not on file    Emotionally Abused: Not on file    Physically Abused: Not on file    Sexually Abused: Not on file   Housing Stability:     Unable to Pay for Housing in the Last Year: Not on file    Number of [place] in the Last Year: Not on file    Unstable Housing in the Last Year: Not on file       Current Outpatient Medications   Medication Sig Dispense Refill    Pseudoephedrine HCl (SUDAFED 12 HOUR PO) Take by mouth as needed      ACETAMINOPHEN PO Take by mouth      Na Sulfate-K Sulfate-Mg Sulf (SUPREP) 17.5-3.13-1.6 GM/177ML SOLN solution Take 177 mLs by mouth See Admin Instructions (Patient not taking: Reported on 2/21/2022) 2 each 0    ferrous sulfate (IRON 325) 325 (65 Fe) MG tablet Take 1 tablet by mouth daily (with breakfast) (Patient not taking: Reported on 3/24/2022) 30 tablet 0     No current facility-administered medications for this visit. Allergies   Allergen Reactions    Amoxicillin Hives    Adhesive Tape      rash    Other Itching and Rash       Review of Systems:         Review of Systems   Constitutional: Negative for chills and fever.    HENT: Negative for ear pain, mouth sores, sore throat and tinnitus. Eyes: Negative for photophobia, redness and itching. Respiratory: Negative for apnea, choking and stridor. Cardiovascular: Negative for chest pain and palpitations. Gastrointestinal: Negative for anal bleeding, constipation and rectal pain. Endocrine: Negative for polydipsia. Genitourinary: Negative for enuresis, flank pain and hematuria. Musculoskeletal: Negative for back pain, joint swelling and myalgias. Skin: Negative for color change and pallor. Allergic/Immunologic: Negative for environmental allergies. Neurological: Negative for syncope and speech difficulty. Psychiatric/Behavioral: Negative for confusion and hallucinations. OBJECTIVE:  Physical Exam:    /70   Pulse 97   Ht 5' 3\" (1.6 m)   Wt 161 lb 6.4 oz (73.2 kg)   LMP 10/02/2012   SpO2 97%   BMI 28.59 kg/m²      Physical Exam  Constitutional:       Appearance: She is well-developed. HENT:      Head: Normocephalic. Eyes:      Pupils: Pupils are equal, round, and reactive to light. Cardiovascular:      Rate and Rhythm: Normal rate. Pulmonary:      Effort: Pulmonary effort is normal.   Abdominal:      General: There is no distension. Palpations: Abdomen is soft. There is no mass. Tenderness: There is no abdominal tenderness. There is no guarding or rebound. Musculoskeletal:         General: Normal range of motion. Cervical back: Normal range of motion and neck supple. Skin:     General: Skin is warm. Neurological:      Mental Status: She is alert and oriented to person, place, and time. ASSESSMENT:  1. S/P colonoscopy    2. Low grade mucinous neoplasm of appendix          PLAN:  Treatment:  Recommend Repeat colonoscopy in 5 years. Patient counseled on risks, benefits, and alternatives of treatment plan at length. Patient states an understanding and willingness to proceed with plan. No orders of the defined types were placed in this encounter.        No orders of the defined types were placed in this encounter. Follow Up:  Return in about 5 years (around 3/24/2027).       Estela Patel PA-C

## 2022-04-27 ENCOUNTER — OFFICE VISIT (OUTPATIENT)
Dept: OBGYN | Age: 42
End: 2022-04-27
Payer: COMMERCIAL

## 2022-04-27 VITALS
WEIGHT: 163 LBS | HEIGHT: 63 IN | BODY MASS INDEX: 28.88 KG/M2 | DIASTOLIC BLOOD PRESSURE: 89 MMHG | SYSTOLIC BLOOD PRESSURE: 150 MMHG

## 2022-04-27 DIAGNOSIS — C18.1 CANCER OF APPENDIX (HCC): Primary | ICD-10-CM

## 2022-04-27 DIAGNOSIS — Z01.419 ENCOUNTER FOR WELL WOMAN EXAM WITH ROUTINE GYNECOLOGICAL EXAM: ICD-10-CM

## 2022-04-27 PROCEDURE — 99396 PREV VISIT EST AGE 40-64: CPT | Performed by: OBSTETRICS & GYNECOLOGY

## 2022-04-27 RX ORDER — BIOTIN 10000 MCG
CAPSULE ORAL
COMMUNITY

## 2022-04-27 RX ORDER — VITAMIN B COMPLEX
1 CAPSULE ORAL DAILY
COMMUNITY

## 2022-04-27 SDOH — ECONOMIC STABILITY: TRANSPORTATION INSECURITY
IN THE PAST 12 MONTHS, HAS THE LACK OF TRANSPORTATION KEPT YOU FROM MEDICAL APPOINTMENTS OR FROM GETTING MEDICATIONS?: NO

## 2022-04-27 SDOH — ECONOMIC STABILITY: INCOME INSECURITY: HOW HARD IS IT FOR YOU TO PAY FOR THE VERY BASICS LIKE FOOD, HOUSING, MEDICAL CARE, AND HEATING?: NOT HARD AT ALL

## 2022-04-27 SDOH — ECONOMIC STABILITY: HOUSING INSECURITY
IN THE LAST 12 MONTHS, WAS THERE A TIME WHEN YOU DID NOT HAVE A STEADY PLACE TO SLEEP OR SLEPT IN A SHELTER (INCLUDING NOW)?: NO

## 2022-04-27 SDOH — ECONOMIC STABILITY: FOOD INSECURITY: WITHIN THE PAST 12 MONTHS, THE FOOD YOU BOUGHT JUST DIDN'T LAST AND YOU DIDN'T HAVE MONEY TO GET MORE.: NEVER TRUE

## 2022-04-27 SDOH — ECONOMIC STABILITY: INCOME INSECURITY: IN THE LAST 12 MONTHS, WAS THERE A TIME WHEN YOU WERE NOT ABLE TO PAY THE MORTGAGE OR RENT ON TIME?: NO

## 2022-04-27 SDOH — ECONOMIC STABILITY: TRANSPORTATION INSECURITY
IN THE PAST 12 MONTHS, HAS LACK OF TRANSPORTATION KEPT YOU FROM MEETINGS, WORK, OR FROM GETTING THINGS NEEDED FOR DAILY LIVING?: NO

## 2022-04-27 SDOH — ECONOMIC STABILITY: FOOD INSECURITY: WITHIN THE PAST 12 MONTHS, YOU WORRIED THAT YOUR FOOD WOULD RUN OUT BEFORE YOU GOT MONEY TO BUY MORE.: NEVER TRUE

## 2022-04-27 NOTE — PROGRESS NOTES
22    Epi Randolph  1980    Chief Complaint   Patient presents with    Gynecologic Exam     pt here for annual, had hyster with right ovary removal, hrt-none, last pap 2016-normal. Dr Denver Dial requesting u/s of left ovary and tube.          Neo Bird is a 35 y.o. female who presents today for evaluation of complaints as above status post appendiceal cancer    Past Medical History:   Diagnosis Date    Abnormal uterine bleeding (AUB)     Anemia     Cancer ([address] 2022    appendix    Dysmenorrhea     HSV-1 (herpes simplex virus 1) infection     Hx of trichomoniasis     Infertility, female     Irregular menses     Triplet pregnancy     UTI (urinary tract infection)        Past Surgical History:   Procedure Laterality Date    ABDOMINOPLASTY      ABDOMINOPLASTY      APPENDECTOMY       SECTION, LOW TRANSVERSE  1997    COLONOSCOPY N/A 3/16/2022    COLONOSCOPY DIAGNOSTIC performed by Andrew Lee MD at [address] [place] [address]      HYSTERECTOMY      LAPAROTOMY N/A 2022    LAPAROTOMY EXPLORATORY APPENDECTOMY RIGHT OOPHERECTOMY FOR OVARIAN TORSION performed by Andrew Lee MD at [place] [address] LIPECTOMY      SINUS SURGERY      TONSILLECTOMY         Social History     Tobacco Use    Smoking status: Former Smoker     Types: Cigarettes     Quit date: 2009     Years since quittin.9    Smokeless tobacco: Never Used   Vaping Use    Vaping Use: Never used   Substance Use Topics    Alcohol use: Yes     Comment: occ    Drug use: No       Family History   Problem Relation Age of Onset    Other Mother     Heart Disease Father     Heart Attack Father        Current Outpatient Medications   Medication Sig Dispense Refill    Multiple Vitamin (MULTIVITAMIN ADULT PO) Take by mouth      Biotin 10 MG CAPS Take by mouth      Cyanocobalamin (VITAMIN B 12 PO) Take by mouth      b complex vitamins capsule Take 1 capsule by mouth daily      ferrous sulfate (IRON 325) 325 (65 Fe) MG tablet Take 1 tablet by mouth daily (with breakfast) 30 tablet 5    Pseudoephedrine HCl (SUDAFED 12 HOUR PO) Take by mouth as needed      ACETAMINOPHEN PO Take by mouth      Na Sulfate-K Sulfate-Mg Sulf (SUPREP) 17.5-3.13-1.6 GM/177ML SOLN solution Take 177 mLs by mouth See Admin Instructions (Patient not taking: Reported on 2022) 2 each 0     No current facility-administered medications for this visit. Allergies   Allergen Reactions    Amoxicillin Hives    Adhesive Tape      rash    Other Itching and Rash           Immunization History   Administered Date(s) Administered    COVID-19, J&J, PF, 0.5 mL 2021    MMR 2012    Tdap (Boostrix, Adacel) 2012       Review of Systems  All other systems reviewed and are negative    BP (!) 150/89   Ht 5' 3\" (1.6 m)   Wt 163 lb (73.9 kg)   LMP 10/02/2012   BMI 28.87 kg/m²     Physical Exam  Nursing note reviewed. Exam conducted with a chaperone present. HENT:      Head: Normocephalic. Nose: Nose normal.   Eyes:      Extraocular Movements: Extraocular movements intact. Pulmonary:      Effort: Pulmonary effort is normal.   Abdominal:      General: Abdomen is flat. Palpations: Abdomen is soft. Hernia: There is no hernia in the left inguinal area or right inguinal area. Genitourinary:     General: Normal vulva. Exam position: Lithotomy position. Pubic Area: No rash or pubic lice. Labia:         Right: No rash, tenderness, lesion or injury. Left: No rash, tenderness, lesion or injury. Urethra: No prolapse, urethral pain, urethral swelling or urethral lesion. Vagina: Normal.      Cervix: Normal.      Uterus: Normal.       Adnexa: Right adnexa normal and left adnexa normal.      Rectum: Normal.   Musculoskeletal:      Cervical back: Normal range of motion.    Lymphadenopathy:      Lower Body: No right inguinal adenopathy. No left inguinal adenopathy. Skin:     General: Skin is warm. Neurological:      Mental Status: She is alert. Uterus and cervix surgically absent    No results found for this visit on 04/27/22. ASSESSMENT AND PLAN   Diagnosis Orders   1. Cancer of appendix ([address]  US NON OB TRANSVAGINAL   2. Encounter for well woman exam with routine gynecological exam         Return for ultrasound, Annual examination.     Karin Peterson MD

## 2022-05-04 ENCOUNTER — OFFICE VISIT (OUTPATIENT)
Dept: OBGYN | Age: 42
End: 2022-05-04
Payer: COMMERCIAL

## 2022-05-04 ENCOUNTER — TELEPHONE (OUTPATIENT)
Dept: ONCOLOGY | Age: 42
End: 2022-05-04

## 2022-05-04 VITALS
HEIGHT: 63 IN | SYSTOLIC BLOOD PRESSURE: 144 MMHG | WEIGHT: 163 LBS | BODY MASS INDEX: 28.88 KG/M2 | DIASTOLIC BLOOD PRESSURE: 84 MMHG

## 2022-05-04 DIAGNOSIS — C18.1 CANCER OF APPENDIX (HCC): Primary | ICD-10-CM

## 2022-05-04 PROCEDURE — G8419 CALC BMI OUT NRM PARAM NOF/U: HCPCS | Performed by: OBSTETRICS & GYNECOLOGY

## 2022-05-04 PROCEDURE — G8427 DOCREV CUR MEDS BY ELIG CLIN: HCPCS | Performed by: OBSTETRICS & GYNECOLOGY

## 2022-05-04 PROCEDURE — 1036F TOBACCO NON-USER: CPT | Performed by: OBSTETRICS & GYNECOLOGY

## 2022-05-04 PROCEDURE — 99213 OFFICE O/P EST LOW 20 MIN: CPT | Performed by: OBSTETRICS & GYNECOLOGY

## 2022-05-04 NOTE — TELEPHONE ENCOUNTER
Patient called to say that Dr Ashish Whittaker did the [address] on her left ovary and she wanted to relay what the Dr Ashish Whittaker had to say, please call

## 2022-05-04 NOTE — PROGRESS NOTES
22    Prasanna Proctor Rodríguez Dodson  1980    Chief Complaint   Patient presents with    Follow-up     ultrasound today. Dr Janette Manzano requesting u/s of left ovary and tube. Olga Aly is a 35 y.o. female who presents today for evaluation of requested follow-up for evaluation of ovaries. Ultrasound today revealed left ovarian cyst and hydrosalpinx. Patient is having no symptoms at this time.     Past Medical History:   Diagnosis Date    Abnormal uterine bleeding (AUB)     Anemia     Cancer ([address] 2022    appendix    Dysmenorrhea     HSV-1 (herpes simplex virus 1) infection     Hx of trichomoniasis     Infertility, female     Irregular menses     Triplet pregnancy     UTI (urinary tract infection)        Past Surgical History:   Procedure Laterality Date    ABDOMINOPLASTY      ABDOMINOPLASTY      APPENDECTOMY       SECTION, LOW TRANSVERSE  1997    COLONOSCOPY N/A 3/16/2022    COLONOSCOPY DIAGNOSTIC performed by Jorje Page MD at [address] [place] [address]      HYSTERECTOMY      LAPAROTOMY N/A 2022    LAPAROTOMY EXPLORATORY APPENDECTOMY RIGHT OOPHERECTOMY FOR OVARIAN TORSION performed by Jorje Page MD at [place] [address] LIPECTOMY      SINUS SURGERY      TONSILLECTOMY         Social History     Tobacco Use    Smoking status: Former Smoker     Types: Cigarettes     Quit date: 2009     Years since quittin.0    Smokeless tobacco: Never Used   Vaping Use    Vaping Use: Never used   Substance Use Topics    Alcohol use: Yes     Comment: occ    Drug use: No       Family History   Problem Relation Age of Onset    Other Mother     Heart Disease Father     Heart Attack Father        Current Outpatient Medications   Medication Sig Dispense Refill    Multiple Vitamin (MULTIVITAMIN ADULT PO) Take by mouth      Biotin 10 MG CAPS Take by mouth      Cyanocobalamin (VITAMIN B 12 PO) Take by mouth      b complex vitamins capsule Take 1 capsule by mouth daily      ferrous sulfate (IRON 325) 325 (65 Fe) MG tablet Take 1 tablet by mouth daily (with breakfast) 30 tablet 5    Pseudoephedrine HCl (SUDAFED 12 HOUR PO) Take by mouth as needed      ACETAMINOPHEN PO Take by mouth      Na Sulfate-K Sulfate-Mg Sulf (SUPREP) 17.5-3.13-1.6 GM/177ML SOLN solution Take 177 mLs by mouth See Admin Instructions (Patient not taking: Reported on 2022) 2 each 0     No current facility-administered medications for this visit. Allergies   Allergen Reactions    Amoxicillin Hives    Adhesive Tape      rash    Other Itching and Rash           Immunization History   Administered Date(s) Administered    COVID-19, J&J, PF, 0.5 mL 2021    MMR 2012    Tdap (Boostrix, Adacel) 2012       Review of Systems  All other systems reviewed and are negative    BP (!) 144/84   Ht 5' 3\" (1.6 m)   Wt 163 lb (73.9 kg)   LMP 10/02/2012   BMI 28.87 kg/m²     Physical Exam    No results found for this visit on 22. ASSESSMENT AND PLAN   Diagnosis Orders   1. Cancer of appendix [facility])     She has been scheduled for follow-up as below. She states that she will discuss the findings with her oncologist and if requested would proceed with laparoscopic nephrectomy    Return in about 2 months (around 2022) for ultrasound, follow up appointment.     Gabriella Parmar MD

## 2022-05-05 ENCOUNTER — TELEPHONE (OUTPATIENT)
Dept: OBGYN | Age: 42
End: 2022-05-05

## 2022-05-05 ENCOUNTER — CLINICAL DOCUMENTATION (OUTPATIENT)
Dept: ONCOLOGY | Age: 42
End: 2022-05-05

## 2022-05-05 NOTE — PROGRESS NOTES
Discussed CT results with Dr. Sally Heaton and states he is agreeable with whatever decision patient decides (it will be patient preference whether she wants to proceed with surgery or postpone and continue with follow up). Physician did advise that if patient has surgery, she will likely go through menopause Called patient at [phone] to notify. Patient voices understanding. No further needs or concerns addressed at this time.

## 2022-05-05 NOTE — TELEPHONE ENCOUNTER
Called patient at [phone] to discuss US results. Patient states that Dr. Shreya Hughes did the [address] on 05/04/2022 and discovered large cyst on left ovary and fallopian tube enlarged and filled with fluid. Patient has had hysterectomy with right unilateral salpingo-oopherectomy. Patient states that Dr. Shreya Hughes can proceed with removing left ovary and fallopian tube now or hold off on surgery and follow up in 2 months.  Will address with Dr. Scottie Carey and call patient back with recommendations per patient request.

## 2022-06-15 RX ORDER — M-VIT,TX,IRON,MINS/CALC/FOLIC 27MG-0.4MG
1 TABLET ORAL DAILY
COMMUNITY

## 2022-06-15 NOTE — PROGRESS NOTES
6/15/22 - . LM concerning  surgery @ [facility] on  6/21/22. Please call the PAT Nurse for a phone assessment and surgery instructions.

## 2022-06-15 NOTE — PROGRESS NOTES
.Surgery @ [facility] on 6/21/22 you will be called 6/20/22 with times               1. Do not eat or drink anything after midnight - unless instructed by your doctor prior to surgery. This includes                   no water, chewing gum or mints. 2. Follow your directions as prescribed by the doctor for your procedure and medications. Do not take any medications morning of surgery. 3. Check with your Doctor regarding stopping vitamins, supplements, blood thinners (Plavix, Coumadin, Lovenox, Effient, Pradaxa, Xarelto, Fragmin or                   other blood thinners) and follow their instructions. Stop all vitamins, supplements and NSAIDS today 6/15/22. 4. Do not smoke, vape or use chewing tobacco morning of surgery. Do not drink any alcoholic beverages 24 hours prior to surgery. This includes NA Beer. No street drugs 7 days prior to surgery. 5. You may brush your teeth and gargle the morning of surgery. DO NOT SWALLOW WATER   6. You MUST make arrangements for a responsible adult to take you home after your surgery and be able to check on you every couple                   hours for the day. You will not be allowed to leave alone or drive yourself home. It is strongly suggested someone stay with you the first 24                   hrs. Your surgery will be cancelled if you do not have a ride home. 7. Please wear simple, loose fitting clothing to the hospital.  Sohan Marquez not bring valuables (money, credit cards, checkbooks, etc.) Do not wear any                   makeup (including no eye makeup) or nail polish on your fingers or toes. 8. DO NOT wear any jewelry or piercings on day of surgery. All body piercing jewelry must be removed. 9. If you have dentures, they will be removed before going to the OR; we will provide you a container. If you wear contact lenses or glasses,                  they will be removed; please bring a case for them.            10. If you  have a Living Will and Durable Power of  for Healthcare, please bring in a copy. 11. Please bring picture ID,  insurance card, paperwork from the doctors office    (H & P, Consent, & card for implantable devices). 12. Take a shower the morning of your procedure with Hibiclens or an anti-bacterial soap. Do not apply any make-up, deodorant, lotion, oil or powder. 13.  Enter thru the main entrance wearing a mask on the day of surgery.

## 2022-06-16 ENCOUNTER — OFFICE VISIT (OUTPATIENT)
Dept: OBGYN | Age: 42
End: 2022-06-16
Payer: COMMERCIAL

## 2022-06-16 VITALS
BODY MASS INDEX: 29.59 KG/M2 | HEIGHT: 63 IN | SYSTOLIC BLOOD PRESSURE: 148 MMHG | WEIGHT: 167 LBS | DIASTOLIC BLOOD PRESSURE: 95 MMHG

## 2022-06-16 DIAGNOSIS — N83.202 LEFT OVARIAN CYST: Primary | ICD-10-CM

## 2022-06-16 PROCEDURE — G8419 CALC BMI OUT NRM PARAM NOF/U: HCPCS | Performed by: OBSTETRICS & GYNECOLOGY

## 2022-06-16 PROCEDURE — G8427 DOCREV CUR MEDS BY ELIG CLIN: HCPCS | Performed by: OBSTETRICS & GYNECOLOGY

## 2022-06-16 PROCEDURE — 1036F TOBACCO NON-USER: CPT | Performed by: OBSTETRICS & GYNECOLOGY

## 2022-06-16 PROCEDURE — 99213 OFFICE O/P EST LOW 20 MIN: CPT | Performed by: OBSTETRICS & GYNECOLOGY

## 2022-06-16 NOTE — PROGRESS NOTES
22    Doreen Anne Pair  1980    Chief Complaint   Patient presents with    Pre-op Exam     Pt here for Preop today for Laparoscopic left salpingo-oophorectomy due to left ovarian cyst        Nakia Prado is a 35 y.o. female who presents today for evaluation of complaints as above      Past Medical History:   Diagnosis Date    Abnormal uterine bleeding (AUB)     Anemia     Cancer ([address] 2022    appendix - surgery    Dysmenorrhea     HSV-1 (herpes simplex virus 1) infection     Hx of trichomoniasis     Infertility, female     Irregular menses     Triplet pregnancy     UTI (urinary tract infection)        Past Surgical History:   Procedure Laterality Date    ABDOMINOPLASTY  2018    APPENDECTOMY  2022     SECTION, LOW TRANSVERSE      , ,     COLONOSCOPY N/A 3/16/2022    COLONOSCOPY DIAGNOSTIC performed by Ranjana Soto MD at [facility]  2016   [address] [facility]  2015    ventral hernia    HYSTERECTOMY ([facility])  2017    LAPAROTOMY N/A 2022    LAPAROTOMY EXPLORATORY APPENDECTOMY RIGHT OOPHERECTOMY FOR OVARIAN TORSION performed by Ranjana Soto MD at [facility] LIPECTOMY      SINUS SURGERY  2018    TONSILLECTOMY         Social History     Tobacco Use    Smoking status: Former Smoker     Packs/day: 1.50     Years: 20.00     Pack years: 30.00     Types: Cigarettes     Start date:      Quit date: 2011     Years since quittin.1    Smokeless tobacco: Never Used   Vaping Use    Vaping Use: Never used   Substance Use Topics    Alcohol use: Yes     Comment: occ    Drug use: No       Family History   Problem Relation Age of Onset    Other Mother     Heart Disease Father     Heart Attack Father        Current Outpatient Medications   Medication Sig Dispense Refill    Multiple Vitamins-Minerals (THERAPEUTIC MULTIVITAMIN-MINERALS) tablet Take 1 tablet by mouth daily      Biotin 10 MG CAPS Take by mouth      Cyanocobalamin (VITAMIN B 12 PO) Take by mouth      b complex vitamins capsule Take 1 capsule by mouth daily      ferrous sulfate (IRON 325) 325 (65 Fe) MG tablet Take 1 tablet by mouth daily (with breakfast) 30 tablet 5    Pseudoephedrine HCl (SUDAFED 12 HOUR PO) Take by mouth as needed      ACETAMINOPHEN PO Take by mouth       No current facility-administered medications for this visit. Allergies   Allergen Reactions    Amoxicillin Hives    Adhesive Tape Rash     rash           Immunization History   Administered Date(s) Administered    COVID-19, J&J, PF, 0.5 mL 2021    MMR 2012    Tdap (Boostrix, Adacel) 2012       Review of Systems  All other systems reviewed and are negative    BP (!) 148/95   Ht 5' 3\" (1.6 m)   Wt 167 lb (75.8 kg)   LMP 10/02/2012   BMI 29.58 kg/m²     Physical Exam    No results found for this visit on 22. ASSESSMENT AND PLAN   Diagnosis Orders   1. Left ovarian cyst       RBAC discussed  Plan lsc lso    Return in about 2 weeks (around 2022) for postop.     Ramon Christine MD

## 2022-06-17 ENCOUNTER — HOSPITAL ENCOUNTER (EMERGENCY)
Age: 42
Discharge: HOME | End: 2022-06-17
Payer: COMMERCIAL

## 2022-06-17 VITALS
SYSTOLIC BLOOD PRESSURE: 162 MMHG | TEMPERATURE: 98.4 F | DIASTOLIC BLOOD PRESSURE: 112 MMHG | RESPIRATION RATE: 16 BRPM | HEART RATE: 92 BPM | OXYGEN SATURATION: 98 %

## 2022-06-17 DIAGNOSIS — R31.9 URINARY TRACT INFECTION WITH HEMATURIA, SITE UNSPECIFIED: Primary | ICD-10-CM

## 2022-06-17 DIAGNOSIS — N39.0 URINARY TRACT INFECTION WITH HEMATURIA, SITE UNSPECIFIED: Primary | ICD-10-CM

## 2022-06-17 LAB
BACTERIA: NEGATIVE /HPF
BILIRUBIN URINE: NEGATIVE MG/DL
BLOOD, URINE: ABNORMAL
CLARITY: CLEAR
COLOR: YELLOW
GLUCOSE, URINE: NEGATIVE MG/DL
KETONES, URINE: NEGATIVE MG/DL
LEUKOCYTE ESTERASE, URINE: ABNORMAL
MUCUS: ABNORMAL HPF
NITRITE URINE, QUANTITATIVE: POSITIVE
PH, URINE: 7 (ref 5–8)
PROTEIN UA: >300 MG/DL
RBC URINE: 430 /HPF (ref 0–6)
SPECIFIC GRAVITY UA: 1.01 (ref 1–1.03)
SQUAMOUS EPITHELIAL: <1 /HPF
TRICHOMONAS: ABNORMAL /HPF
URIC ACID CRYSTALS: ABNORMAL /HPF
UROBILINOGEN, URINE: 1 MG/DL (ref 0.2–1)
WBC UA: 92 /HPF (ref 0–5)

## 2022-06-17 PROCEDURE — 81001 URINALYSIS AUTO W/SCOPE: CPT

## 2022-06-17 PROCEDURE — 87186 SC STD MICRODIL/AGAR DIL: CPT

## 2022-06-17 PROCEDURE — 87086 URINE CULTURE/COLONY COUNT: CPT

## 2022-06-17 PROCEDURE — 6370000000 HC RX 637 (ALT 250 FOR IP): Performed by: PHYSICIAN ASSISTANT

## 2022-06-17 PROCEDURE — 87077 CULTURE AEROBIC IDENTIFY: CPT

## 2022-06-17 PROCEDURE — 99283 EMERGENCY DEPT VISIT LOW MDM: CPT

## 2022-06-17 RX ORDER — CEPHALEXIN 500 MG/1
500 CAPSULE ORAL 2 TIMES DAILY
Qty: 14 CAPSULE | Refills: 0 | Status: SHIPPED | OUTPATIENT
Start: 2022-06-17 | End: 2022-06-24

## 2022-06-17 RX ORDER — CEPHALEXIN 250 MG/1
500 CAPSULE ORAL ONCE
Status: COMPLETED | OUTPATIENT
Start: 2022-06-17 | End: 2022-06-17

## 2022-06-17 RX ADMIN — CEPHALEXIN 500 MG: 250 CAPSULE ORAL at 22:41

## 2022-06-17 ASSESSMENT — PAIN DESCRIPTION - DESCRIPTORS: DESCRIPTORS: BURNING;ACHING

## 2022-06-17 ASSESSMENT — PAIN DESCRIPTION - ORIENTATION: ORIENTATION: RIGHT

## 2022-06-17 ASSESSMENT — PAIN DESCRIPTION - FREQUENCY: FREQUENCY: CONTINUOUS

## 2022-06-17 ASSESSMENT — PAIN DESCRIPTION - ONSET: ONSET: GRADUAL

## 2022-06-17 ASSESSMENT — PAIN - FUNCTIONAL ASSESSMENT: PAIN_FUNCTIONAL_ASSESSMENT: NONE - DENIES PAIN

## 2022-06-17 ASSESSMENT — PAIN DESCRIPTION - LOCATION: LOCATION: FLANK;PELVIS

## 2022-06-17 ASSESSMENT — PAIN SCALES - GENERAL: PAINLEVEL_OUTOF10: 7

## 2022-06-18 NOTE — ED PROVIDER NOTES
Diana LUCERO Sung 89 ENCOUNTER      PCP: Tod Chiu MD    [facility]    Chief Complaint   Patient presents with    Urinary Tract Infection     reports dysuria and polyuria since last night; no hematuria       This patient was not evaluated by the attending physician. I have independently evaluated this patient. HPI    Ivis Britton is a 35 y.o. female who presents with urinary discomfort since last night. Patient has had associated increased urinary frequency. The patient denies abdominal pain except for mild suprapubic pressure. Denies vaginal symptoms including bleeding, discharge, odor, or pain. Patient has a history of UTI and current symptoms feel similar. Denies pregnancy. REVIEW OF SYSTEMS    Constitutional:  Denies fever  HENT:  Denies sore throat or ear pain   Respiratory:  Denies cough or shortness of breath   Cardiovascular:  Denies chest pain, palpitations or swelling   GI:  See HPI. Denies vomiting, or diarrhea   :  See HPI.   Musculoskeletal:  Denies pain or swelling of extremities  Skin:  Denies rash   Neurologic:  Denies headache, focal weakness or sensory changes     See HPI and nursing notes additional information  All other review of systems negative at this time      PAST MEDICAL HISTORY/SURGICAL HISTORY    Past Medical History:   Diagnosis Date    Abnormal uterine bleeding (AUB)     Anemia     Cancer ([facility] [address] 2022    appendix - surgery    Dysmenorrhea     HSV-1 (herpes simplex virus 1) infection     Hx of trichomoniasis     Infertility, female     Irregular menses     Triplet pregnancy     UTI (urinary tract infection)      Past Surgical History:   Procedure Laterality Date    ABDOMINOPLASTY  2018    APPENDECTOMY  2022     SECTION, LOW TRANSVERSE      , ,     COLONOSCOPY N/A 3/16/2022    COLONOSCOPY DIAGNOSTIC performed by Teo Taveras MD at [facility]     [address] [facility] 2015    ventral hernia    HYSTERECTOMY (CERVIX STATUS UNKNOWN)  2017    LAPAROTOMY N/A 2022    LAPAROTOMY EXPLORATORY APPENDECTOMY RIGHT OOPHERECTOMY FOR OVARIAN TORSION performed by Carleen Nyhan, MD at [place] [address] LIPECTOMY      SINUS SURGERY  2018    TONSILLECTOMY         CURRENT MEDICATIONS    Current Outpatient Rx   Medication Sig Dispense Refill    cephALEXin (KEFLEX) 500 MG capsule Take 1 capsule by mouth 2 times daily for 7 days 14 capsule 0    Multiple Vitamins-Minerals (THERAPEUTIC MULTIVITAMIN-MINERALS) tablet Take 1 tablet by mouth daily      Biotin 10 MG CAPS Take by mouth      Cyanocobalamin (VITAMIN B 12 PO) Take by mouth      b complex vitamins capsule Take 1 capsule by mouth daily      ferrous sulfate (IRON 325) 325 (65 Fe) MG tablet Take 1 tablet by mouth daily (with breakfast) 30 tablet 5    Pseudoephedrine HCl (SUDAFED 12 HOUR PO) Take by mouth as needed      ACETAMINOPHEN PO Take by mouth         ALLERGIES    Allergies   Allergen Reactions    Amoxicillin Hives    Adhesive Tape Rash     rash       FAMILY HISTORY/SOCIAL HISTORY  Family History   Problem Relation Age of Onset    Other Mother     Heart Disease Father     Heart Attack Father      Social History     Socioeconomic History    Marital status: Single     Spouse name: None    Number of children: None    Years of education: None    Highest education level: None   Occupational History    None   Tobacco Use    Smoking status: Former Smoker     Packs/day: 1.50     Years: 20.00     Pack years: 30.00     Types: Cigarettes     Start date: 12     Quit date: 2011     Years since quittin.1    Smokeless tobacco: Never Used   Vaping Use    Vaping Use: Never used   Substance and Sexual Activity    Alcohol use: Yes     Comment: occ    Drug use: No    Sexual activity: Yes     Partners: Male   Other Topics Concern    None   Social History Narrative    None     Social Determinants of Health     Financial Resource Strain: Low Risk     Difficulty of Paying Living Expenses: Not hard at all   Food Insecurity: No Food Insecurity    Worried About Running Out of Food in the Last Year: Never true    Darrian of Food in the Last Year: Never true   Transportation Needs: No Transportation Needs    Lack of Transportation (Medical): No    Lack of Transportation (Non-Medical):  No   Physical Activity:     Days of Exercise per Week: Not on file    Minutes of Exercise per Session: Not on file   Stress:     Feeling of Stress : Not on file   Social Connections:     Frequency of Communication with Friends and Family: Not on file    Frequency of Social Gatherings with Friends and Family: Not on file    Attends Latter-day Services: Not on file    Active Member of [address] [place] [address] [address] or Organizations: Not on file    Attends Club or Organization Meetings: Not on file    Marital Status: Not on file   Intimate Partner Violence:     Fear of Current or Ex-Partner: Not on file    Emotionally Abused: Not on file    Physically Abused: Not on file    Sexually Abused: Not on file   Housing Stability: Unknown    Unable to Pay for Housing in the Last Year: No    Number of [place] in the Last Year: Not on file    Unstable Housing in the Last Year: No       PHYSICAL EXAM    VITAL SIGNS: BP (!) 163/100   Pulse 93   Temp 98.4 °F (36.9 °C) (Oral)   Resp 16   LMP 10/02/2012   SpO2 97%    Constitutional:  Well-developed, well-nourished, appears comfortable  Eyes:  Non-icteric sclera, no conjunctival injection   HENT:  Atraumatic  NECK: Supple, no JVD   Respiratory:  No respiratory distress, normal breath sounds   Cardiovascular:  Heart rate normal, normal rhythm, no murmurs  GI:  Abdomen soft, non-distended, no abdominal tenderness  :  no CVA tenderness  Integument:  Well hydrated,Nondiaphoretic Skin, no obvious rashes,      LABS:  Results for orders placed or performed during the hospital encounter of 06/17/22   Urinalysis   Result Value Ref Range    Color, UA YELLOW YELLOW    Clarity, UA CLEAR CLEAR    Glucose, Urine NEGATIVE NEGATIVE MG/DL    Bilirubin Urine NEGATIVE NEGATIVE MG/DL    Ketones, Urine NEGATIVE NEGATIVE MG/DL    Specific Gravity, UA 1.015 1.001 - 1.035    Blood, Urine LARGE (A) NEGATIVE    pH, Urine 7.0 5.0 - 8.0    Protein, UA >300 (HH) NEGATIVE MG/DL    Urobilinogen, Urine 1.0 0.2 - 1.0 MG/DL    Nitrite Urine, Quantitative POSITIVE (A) NEGATIVE    Leukocyte Esterase, Urine SMALL (A) NEGATIVE    RBC,  (H) 0 - 6 /HPF    WBC, UA 92 (H) 0 - 5 /HPF    Bacteria, UA NEGATIVE NEGATIVE /HPF    Squam Epithel, UA <1 /HPF    Mucus, UA RARE (A) NEGATIVE HPF    Trichomonas, UA NONE SEEN NONE SEEN /HPF    Uric Acid Marina, UA OCCASIONAL /HPF             ED COURSE & MEDICAL DECISION MAKING      Patient presents as above. Patient is well-appearing, nontoxic. Patient comfortably sitting exam bed in no acute distress. Urinalysis shows positive nitrites, 430 red blood cells, small leukocytes, 92 white blood cells and negative bacteria. Urine sent for culture. Abdomen is soft, nontender on exam, no CVA tenderness. Patient states symptoms feel similar to previous UTIs and I have low suspicion of kidney stone, sinusitis or diverticulitis at this time. Recommend follow-up with primary care provider in 2 days for recheck. Patient provided prescription for Keflex. Clinical  IMPRESSION    Urinary Tract Infection with hematuria    Diagnosis and plan discussed in detail with patient who understands and agrees. Patient agrees to return emergency department if symptoms worsen or any new symptoms develop. Comment: Please note this report has been produced using speech recognition software and may contain errors related to that system including errors in grammar, punctuation, and spelling, as well as words and phrases that may be inappropriate.  If there are any questions or concerns please feel free to contact the dictating provider for clarification.       Allyson Hodge PA-C  06/17/22 [id number]

## 2022-06-20 ENCOUNTER — ANESTHESIA EVENT (OUTPATIENT)
Dept: OPERATING ROOM | Age: 42
End: 2022-06-20
Payer: COMMERCIAL

## 2022-06-20 LAB
CULTURE: ABNORMAL
CULTURE: ABNORMAL
Lab: ABNORMAL
SPECIMEN: ABNORMAL

## 2022-06-20 NOTE — PROGRESS NOTES
Called and confirmed with patient surgery at [facility]  on 6/21/22 at 1430 with an arrival time of  1230 . Come into the Main entrance and check in. You can bring two people with you and wear a mask. Patient verbalized understanding.

## 2022-06-20 NOTE — ANESTHESIA PRE PROCEDURE
Department of Anesthesiology  Preprocedure Note       Name:  Magalie Mcnamara   Age:  35 y.o.  :  1980                                          MRN:  [id number]         Date:  2022      Surgeon: Jony Aguilera):  Chantel Rich MD    Procedure: Procedure(s):  LEFT SALPINGO OOPHORECTOMY LAPAROSCOPIC    Medications prior to admission:   Prior to Admission medications    Medication Sig Start Date End Date Taking? Authorizing Provider   Multiple Vitamins-Minerals (THERAPEUTIC MULTIVITAMIN-MINERALS) tablet Take 1 tablet by mouth daily   Yes Historical Provider, MD   cephALEXin (KEFLEX) 500 MG capsule Take 1 capsule by mouth 2 times daily for 7 days 22  Paige Cox PA-C   Biotin 10 MG CAPS Take by mouth    Historical Provider, MD   Cyanocobalamin (VITAMIN B 12 PO) Take by mouth    Historical Provider, MD   b complex vitamins capsule Take 1 capsule by mouth daily    Historical Provider, MD   ferrous sulfate (IRON 325) 325 (65 Fe) MG tablet Take 1 tablet by mouth daily (with breakfast) 3/24/22   Augustin Villela MD   Pseudoephedrine HCl (SUDAFED 12 HOUR PO) Take by mouth as needed    Historical Provider, MD   ACETAMINOPHEN PO Take by mouth    Historical Provider, MD       Current medications:    No current facility-administered medications for this encounter.      Current Outpatient Medications   Medication Sig Dispense Refill    Multiple Vitamins-Minerals (THERAPEUTIC MULTIVITAMIN-MINERALS) tablet Take 1 tablet by mouth daily      cephALEXin (KEFLEX) 500 MG capsule Take 1 capsule by mouth 2 times daily for 7 days 14 capsule 0    Biotin 10 MG CAPS Take by mouth      Cyanocobalamin (VITAMIN B 12 PO) Take by mouth      b complex vitamins capsule Take 1 capsule by mouth daily      ferrous sulfate (IRON 325) 325 (65 Fe) MG tablet Take 1 tablet by mouth daily (with breakfast) 30 tablet 5    Pseudoephedrine HCl (SUDAFED 12 HOUR PO) Take by mouth as needed      ACETAMINOPHEN PO Take by mouth Allergies:     Allergies   Allergen Reactions    Amoxicillin Hives    Adhesive Tape Rash     rash       Problem List:    Patient Active Problem List   Diagnosis Code    Gastroenteritis K52.9    Encounter for supervision of normal pregnancy in multigravida Z34.80    Status post repeat low transverse  section Z98.891    Ventral hernia K43.9    S/P repair of ventral hernia Z98.890, Z87.19    Abdominal wall mass of epigastric region R19.06    Internal hemorrhoids K64.8       Past Medical History:        Diagnosis Date    Abnormal uterine bleeding (AUB)     Anemia     Cancer ([facility] [address] 2022    appendix - surgery    Dysmenorrhea     HSV-1 (herpes simplex virus 1) infection     Hx of trichomoniasis     Infertility, female     Irregular menses     Triplet pregnancy     UTI (urinary tract infection)        Past Surgical History:        Procedure Laterality Date    ABDOMINOPLASTY  2018    APPENDECTOMY  2022     SECTION, LOW TRANSVERSE      , ,     COLONOSCOPY N/A 3/16/2022    COLONOSCOPY DIAGNOSTIC performed by Niyah Almendarez MD at [address] [facility]  2016   [address] [facility]      ventral hernia    HYSTERECTOMY ([facility])  2017    LAPAROTOMY N/A 2022    LAPAROTOMY EXPLORATORY APPENDECTOMY RIGHT OOPHERECTOMY FOR OVARIAN TORSION performed by Niyah Almendarez MD at [facility]  2018    TONSILLECTOMY         Social History:    Social History     Tobacco Use    Smoking status: Former Smoker     Packs/day: 1.50     Years: 20.00     Pack years: 30.00     Types: Cigarettes     Start date: 12     Quit date: 2011     Years since quittin.1    Smokeless tobacco: Never Used   Substance Use Topics    Alcohol use: Yes     Comment: occ                                Counseling given: Not Answered      Vital Signs (Current):   Vitals:    06/15/22 1421   Weight: 160 lb (72.6 smoker   Cardiovascular:  Exercise tolerance: good (>4 METS),            Beta Blocker:  Not on Beta Blocker         Neuro/Psych:   Negative Neuro/Psych ROS              GI/Hepatic/Renal: Neg GI/Hepatic/Renal ROS            Endo/Other:    (+) malignancy/cancer. Abdominal:             Vascular: negative vascular ROS. Other Findings:           Anesthesia Plan      general     ASA 2       Induction: intravenous. MIPS: Postoperative opioids intended and Prophylactic antiemetics administered. Anesthetic plan and risks discussed with patient. Plan discussed with CRNA. Pre Anesthesia Evaluation complete. Anesthesia plan, risks, benefits, alternatives, and personal involved discussed with patient. Patients and/or legal guardian verbalized an understanding  and agreed to proceed.   Danny Perez DO  6/21/2022

## 2022-06-21 ENCOUNTER — HOSPITAL ENCOUNTER (OUTPATIENT)
Age: 42
Setting detail: OUTPATIENT SURGERY
Discharge: HOME | End: 2022-06-21
Attending: OBSTETRICS & GYNECOLOGY | Admitting: OBSTETRICS & GYNECOLOGY
Payer: COMMERCIAL

## 2022-06-21 ENCOUNTER — ANESTHESIA (OUTPATIENT)
Dept: OPERATING ROOM | Age: 42
End: 2022-06-21
Payer: COMMERCIAL

## 2022-06-21 VITALS
DIASTOLIC BLOOD PRESSURE: 90 MMHG | BODY MASS INDEX: 28.35 KG/M2 | SYSTOLIC BLOOD PRESSURE: 184 MMHG | WEIGHT: 160 LBS | OXYGEN SATURATION: 98 % | HEIGHT: 63 IN | HEART RATE: 83 BPM | RESPIRATION RATE: 18 BRPM | TEMPERATURE: 96.5 F

## 2022-06-21 DIAGNOSIS — N83.202 LEFT OVARIAN CYST: ICD-10-CM

## 2022-06-21 LAB
BASOPHILS ABSOLUTE: 0 K/CU MM
BASOPHILS RELATIVE PERCENT: 0.5 % (ref 0–1)
DIFFERENTIAL TYPE: ABNORMAL
EOSINOPHILS ABSOLUTE: 0.2 K/CU MM
EOSINOPHILS RELATIVE PERCENT: 4 % (ref 0–3)
HCT VFR BLD CALC: 37.3 % (ref 37–47)
HEMOGLOBIN: 11.3 GM/DL (ref 12.5–16)
IMMATURE NEUTROPHIL %: 0.2 % (ref 0–0.43)
LYMPHOCYTES ABSOLUTE: 1.3 K/CU MM
LYMPHOCYTES RELATIVE PERCENT: 21.5 % (ref 24–44)
MCH RBC QN AUTO: 28.1 PG (ref 27–31)
MCHC RBC AUTO-ENTMCNC: 30.3 % (ref 32–36)
MCV RBC AUTO: 92.8 FL (ref 78–100)
MONOCYTES ABSOLUTE: 0.4 K/CU MM
MONOCYTES RELATIVE PERCENT: 7.4 % (ref 0–4)
NUCLEATED RBC %: 0 %
PDW BLD-RTO: 14.4 % (ref 11.7–14.9)
PLATELET # BLD: 267 K/CU MM (ref 140–440)
PMV BLD AUTO: 10.3 FL (ref 7.5–11.1)
RBC # BLD: 4.02 M/CU MM (ref 4.2–5.4)
SEGMENTED NEUTROPHILS ABSOLUTE COUNT: 4 K/CU MM
SEGMENTED NEUTROPHILS RELATIVE PERCENT: 66.4 % (ref 36–66)
TOTAL IMMATURE NEUTOROPHIL: 0.01 K/CU MM
TOTAL NUCLEATED RBC: 0 K/CU MM
WBC # BLD: 6 K/CU MM (ref 4–10.5)

## 2022-06-21 PROCEDURE — 2580000003 HC RX 258: Performed by: ANESTHESIOLOGY

## 2022-06-21 PROCEDURE — 7100000000 HC PACU RECOVERY - FIRST 15 MIN: Performed by: OBSTETRICS & GYNECOLOGY

## 2022-06-21 PROCEDURE — 3600000014 HC SURGERY LEVEL 4 ADDTL 15MIN: Performed by: OBSTETRICS & GYNECOLOGY

## 2022-06-21 PROCEDURE — 6370000000 HC RX 637 (ALT 250 FOR IP): Performed by: ANESTHESIOLOGY

## 2022-06-21 PROCEDURE — 7100000001 HC PACU RECOVERY - ADDTL 15 MIN: Performed by: OBSTETRICS & GYNECOLOGY

## 2022-06-21 PROCEDURE — 7100000011 HC PHASE II RECOVERY - ADDTL 15 MIN: Performed by: OBSTETRICS & GYNECOLOGY

## 2022-06-21 PROCEDURE — 88305 TISSUE EXAM BY PATHOLOGIST: CPT | Performed by: PATHOLOGY

## 2022-06-21 PROCEDURE — 3600000004 HC SURGERY LEVEL 4 BASE: Performed by: OBSTETRICS & GYNECOLOGY

## 2022-06-21 PROCEDURE — 85025 COMPLETE CBC W/AUTO DIFF WBC: CPT

## 2022-06-21 PROCEDURE — 2709999900 HC NON-CHARGEABLE SUPPLY: Performed by: OBSTETRICS & GYNECOLOGY

## 2022-06-21 PROCEDURE — 6360000002 HC RX W HCPCS: Performed by: ANESTHESIOLOGY

## 2022-06-21 PROCEDURE — 6370000000 HC RX 637 (ALT 250 FOR IP): Performed by: OBSTETRICS & GYNECOLOGY

## 2022-06-21 PROCEDURE — 2500000003 HC RX 250 WO HCPCS

## 2022-06-21 PROCEDURE — 2580000003 HC RX 258

## 2022-06-21 PROCEDURE — 2720000010 HC SURG SUPPLY STERILE: Performed by: OBSTETRICS & GYNECOLOGY

## 2022-06-21 PROCEDURE — 3700000000 HC ANESTHESIA ATTENDED CARE: Performed by: OBSTETRICS & GYNECOLOGY

## 2022-06-21 PROCEDURE — 58661 LAPAROSCOPY REMOVE ADNEXA: CPT | Performed by: OBSTETRICS & GYNECOLOGY

## 2022-06-21 PROCEDURE — 6360000002 HC RX W HCPCS

## 2022-06-21 PROCEDURE — 7100000010 HC PHASE II RECOVERY - FIRST 15 MIN: Performed by: OBSTETRICS & GYNECOLOGY

## 2022-06-21 PROCEDURE — 3700000001 HC ADD 15 MINUTES (ANESTHESIA): Performed by: OBSTETRICS & GYNECOLOGY

## 2022-06-21 RX ORDER — ONDANSETRON 2 MG/ML
INJECTION INTRAMUSCULAR; INTRAVENOUS PRN
Status: DISCONTINUED | OUTPATIENT
Start: 2022-06-21 | End: 2022-06-21 | Stop reason: SDUPTHER

## 2022-06-21 RX ORDER — OXYCODONE HYDROCHLORIDE 5 MG/1
5 TABLET ORAL
Status: COMPLETED | OUTPATIENT
Start: 2022-06-21 | End: 2022-06-21

## 2022-06-21 RX ORDER — LIDOCAINE HYDROCHLORIDE 20 MG/ML
INJECTION, SOLUTION EPIDURAL; INFILTRATION; INTRACAUDAL; PERINEURAL PRN
Status: DISCONTINUED | OUTPATIENT
Start: 2022-06-21 | End: 2022-06-21 | Stop reason: SDUPTHER

## 2022-06-21 RX ORDER — ROCURONIUM BROMIDE 10 MG/ML
INJECTION, SOLUTION INTRAVENOUS PRN
Status: DISCONTINUED | OUTPATIENT
Start: 2022-06-21 | End: 2022-06-21 | Stop reason: SDUPTHER

## 2022-06-21 RX ORDER — MIDAZOLAM HYDROCHLORIDE 2 MG/2ML
2 INJECTION, SOLUTION INTRAMUSCULAR; INTRAVENOUS
Status: DISCONTINUED | OUTPATIENT
Start: 2022-06-21 | End: 2022-06-21 | Stop reason: HOSPADM

## 2022-06-21 RX ORDER — HYDROCODONE BITARTRATE AND ACETAMINOPHEN 5; 325 MG/1; MG/1
1 TABLET ORAL EVERY 6 HOURS PRN
Qty: 20 TABLET | Refills: 0 | Status: SHIPPED | OUTPATIENT
Start: 2022-06-21 | End: 2022-06-26

## 2022-06-21 RX ORDER — SODIUM CHLORIDE, SODIUM LACTATE, POTASSIUM CHLORIDE, CALCIUM CHLORIDE 600; 310; 30; 20 MG/100ML; MG/100ML; MG/100ML; MG/100ML
INJECTION, SOLUTION INTRAVENOUS CONTINUOUS
Status: DISCONTINUED | OUTPATIENT
Start: 2022-06-21 | End: 2022-06-21 | Stop reason: HOSPADM

## 2022-06-21 RX ORDER — PROPOFOL 10 MG/ML
INJECTION, EMULSION INTRAVENOUS PRN
Status: DISCONTINUED | OUTPATIENT
Start: 2022-06-21 | End: 2022-06-21 | Stop reason: SDUPTHER

## 2022-06-21 RX ORDER — FENTANYL CITRATE 50 UG/ML
INJECTION, SOLUTION INTRAMUSCULAR; INTRAVENOUS PRN
Status: DISCONTINUED | OUTPATIENT
Start: 2022-06-21 | End: 2022-06-21 | Stop reason: SDUPTHER

## 2022-06-21 RX ORDER — CELECOXIB 200 MG/1
200 CAPSULE ORAL ONCE
Status: COMPLETED | OUTPATIENT
Start: 2022-06-21 | End: 2022-06-21

## 2022-06-21 RX ORDER — PROCHLORPERAZINE EDISYLATE 5 MG/ML
5 INJECTION INTRAMUSCULAR; INTRAVENOUS
Status: DISCONTINUED | OUTPATIENT
Start: 2022-06-21 | End: 2022-06-21 | Stop reason: HOSPADM

## 2022-06-21 RX ORDER — IPRATROPIUM BROMIDE AND ALBUTEROL SULFATE 2.5; .5 MG/3ML; MG/3ML
1 SOLUTION RESPIRATORY (INHALATION)
Status: DISCONTINUED | OUTPATIENT
Start: 2022-06-21 | End: 2022-06-21 | Stop reason: HOSPADM

## 2022-06-21 RX ORDER — DEXAMETHASONE SODIUM PHOSPHATE 4 MG/ML
INJECTION, SOLUTION INTRA-ARTICULAR; INTRALESIONAL; INTRAMUSCULAR; INTRAVENOUS; SOFT TISSUE PRN
Status: DISCONTINUED | OUTPATIENT
Start: 2022-06-21 | End: 2022-06-21 | Stop reason: SDUPTHER

## 2022-06-21 RX ORDER — MEPERIDINE HYDROCHLORIDE 25 MG/ML
12.5 INJECTION INTRAMUSCULAR; INTRAVENOUS; SUBCUTANEOUS EVERY 5 MIN PRN
Status: DISCONTINUED | OUTPATIENT
Start: 2022-06-21 | End: 2022-06-21 | Stop reason: HOSPADM

## 2022-06-21 RX ORDER — DIPHENHYDRAMINE HYDROCHLORIDE 50 MG/ML
12.5 INJECTION INTRAMUSCULAR; INTRAVENOUS
Status: DISCONTINUED | OUTPATIENT
Start: 2022-06-21 | End: 2022-06-21 | Stop reason: HOSPADM

## 2022-06-21 RX ORDER — ACETAMINOPHEN 500 MG
1000 TABLET ORAL ONCE
Status: COMPLETED | OUTPATIENT
Start: 2022-06-21 | End: 2022-06-21

## 2022-06-21 RX ORDER — HYDRALAZINE HYDROCHLORIDE 20 MG/ML
10 INJECTION INTRAMUSCULAR; INTRAVENOUS
Status: DISCONTINUED | OUTPATIENT
Start: 2022-06-21 | End: 2022-06-21 | Stop reason: HOSPADM

## 2022-06-21 RX ORDER — FENTANYL CITRATE 50 UG/ML
50 INJECTION, SOLUTION INTRAMUSCULAR; INTRAVENOUS EVERY 5 MIN PRN
Status: COMPLETED | OUTPATIENT
Start: 2022-06-21 | End: 2022-06-21

## 2022-06-21 RX ORDER — KETOROLAC TROMETHAMINE 30 MG/ML
INJECTION, SOLUTION INTRAMUSCULAR; INTRAVENOUS PRN
Status: DISCONTINUED | OUTPATIENT
Start: 2022-06-21 | End: 2022-06-21 | Stop reason: SDUPTHER

## 2022-06-21 RX ORDER — HALOPERIDOL 5 MG/ML
1 INJECTION INTRAMUSCULAR
Status: DISCONTINUED | OUTPATIENT
Start: 2022-06-21 | End: 2022-06-21 | Stop reason: HOSPADM

## 2022-06-21 RX ORDER — LABETALOL HYDROCHLORIDE 5 MG/ML
10 INJECTION, SOLUTION INTRAVENOUS
Status: DISCONTINUED | OUTPATIENT
Start: 2022-06-21 | End: 2022-06-21 | Stop reason: HOSPADM

## 2022-06-21 RX ORDER — SODIUM CHLORIDE, SODIUM LACTATE, POTASSIUM CHLORIDE, CALCIUM CHLORIDE 600; 310; 30; 20 MG/100ML; MG/100ML; MG/100ML; MG/100ML
INJECTION, SOLUTION INTRAVENOUS CONTINUOUS PRN
Status: DISCONTINUED | OUTPATIENT
Start: 2022-06-21 | End: 2022-06-21 | Stop reason: SDUPTHER

## 2022-06-21 RX ADMIN — ACETAMINOPHEN 1000 MG: 500 TABLET ORAL at 13:29

## 2022-06-21 RX ADMIN — ONDANSETRON 4 MG: 2 INJECTION INTRAMUSCULAR; INTRAVENOUS at 17:46

## 2022-06-21 RX ADMIN — SODIUM CHLORIDE, POTASSIUM CHLORIDE, SODIUM LACTATE AND CALCIUM CHLORIDE: 600; 310; 30; 20 INJECTION, SOLUTION INTRAVENOUS at 13:19

## 2022-06-21 RX ADMIN — OXYCODONE HYDROCHLORIDE 5 MG: 5 TABLET ORAL at 18:57

## 2022-06-21 RX ADMIN — KETOROLAC TROMETHAMINE 30 MG: 30 INJECTION, SOLUTION INTRAMUSCULAR; INTRAVENOUS at 17:46

## 2022-06-21 RX ADMIN — DEXAMETHASONE SODIUM PHOSPHATE 4 MG: 4 INJECTION, SOLUTION INTRAMUSCULAR; INTRAVENOUS at 17:09

## 2022-06-21 RX ADMIN — FENTANYL CITRATE 50 MCG: 50 INJECTION, SOLUTION INTRAMUSCULAR; INTRAVENOUS at 18:30

## 2022-06-21 RX ADMIN — FENTANYL CITRATE 50 MCG: 50 INJECTION, SOLUTION INTRAMUSCULAR; INTRAVENOUS at 18:36

## 2022-06-21 RX ADMIN — HYDROMORPHONE HYDROCHLORIDE 0.5 MG: 1 INJECTION, SOLUTION INTRAMUSCULAR; INTRAVENOUS; SUBCUTANEOUS at 17:28

## 2022-06-21 RX ADMIN — PROPOFOL 200 MG: 10 INJECTION, EMULSION INTRAVENOUS at 17:09

## 2022-06-21 RX ADMIN — CELECOXIB 200 MG: 200 CAPSULE ORAL at 13:28

## 2022-06-21 RX ADMIN — ROCURONIUM BROMIDE 50 MG: 50 INJECTION, SOLUTION INTRAVENOUS at 17:09

## 2022-06-21 RX ADMIN — FENTANYL CITRATE 100 MCG: 50 INJECTION, SOLUTION INTRAMUSCULAR; INTRAVENOUS at 17:09

## 2022-06-21 RX ADMIN — LIDOCAINE HYDROCHLORIDE 100 MG: 20 INJECTION, SOLUTION EPIDURAL; INFILTRATION; INTRACAUDAL; PERINEURAL at 17:09

## 2022-06-21 RX ADMIN — SODIUM CHLORIDE, POTASSIUM CHLORIDE, SODIUM LACTATE AND CALCIUM CHLORIDE: 600; 310; 30; 20 INJECTION, SOLUTION INTRAVENOUS at 17:17

## 2022-06-21 RX ADMIN — SUGAMMADEX 200 MG: 100 INJECTION, SOLUTION INTRAVENOUS at 17:46

## 2022-06-21 ASSESSMENT — PAIN DESCRIPTION - ORIENTATION
ORIENTATION: RIGHT;LEFT;ANTERIOR;DISTAL
ORIENTATION: LOWER
ORIENTATION: RIGHT;LEFT;ANTERIOR;POSTERIOR
ORIENTATION: LOWER

## 2022-06-21 ASSESSMENT — PAIN DESCRIPTION - LOCATION
LOCATION: ABDOMEN
LOCATION: HEAD
LOCATION: ABDOMEN
LOCATION: HEAD
LOCATION: ABDOMEN
LOCATION: ABDOMEN

## 2022-06-21 ASSESSMENT — PAIN DESCRIPTION - PAIN TYPE
TYPE: SURGICAL PAIN

## 2022-06-21 ASSESSMENT — PAIN DESCRIPTION - ONSET
ONSET: ON-GOING

## 2022-06-21 ASSESSMENT — PAIN DESCRIPTION - FREQUENCY
FREQUENCY: CONTINUOUS

## 2022-06-21 ASSESSMENT — PAIN DESCRIPTION - DESCRIPTORS
DESCRIPTORS: ACHING

## 2022-06-21 ASSESSMENT — PAIN SCALES - GENERAL
PAINLEVEL_OUTOF10: 4
PAINLEVEL_OUTOF10: 8
PAINLEVEL_OUTOF10: 3
PAINLEVEL_OUTOF10: 5
PAINLEVEL_OUTOF10: 5
PAINLEVEL_OUTOF10: 8
PAINLEVEL_OUTOF10: 5
PAINLEVEL_OUTOF10: 5

## 2022-06-21 ASSESSMENT — PAIN - FUNCTIONAL ASSESSMENT
PAIN_FUNCTIONAL_ASSESSMENT: ACTIVITIES ARE NOT PREVENTED

## 2022-06-21 NOTE — PROGRESS NOTES
1845 - Patient returned to room from pacu,  A+Ox4,VSS (see doc flow), assessment completed as per doc flow. Patient has 5/10 c/o pain, RN to provide PRN pain meds. Fluids stopped, IV saline locked. Beverage offered. Call light in reach, bed in low position. RN to continue to monitor. Family at bedside. 1943 - Patient discharge instructions and prescriptions reviewed and verified. RN reviewed d/c instructions with patient and friend. All questions answered. Prescription information and side effects given to patient. Patient to call Primary physician tomw  to address high BP. Discharge paperwork signed by RN and patient's friend. Humera Prajapati and asked him to send Rx to [facility] [address] in [address] [facility], as  original pharmacy it was called into is now closed, Dr Leanna Prajapati said he would try to do it from home, pt and her friend made aware at bedside. 65 - Discharged to car  via wheelchair, home with friend.

## 2022-06-21 NOTE — H&P
Abbie Hunt  1980          Chief Complaint   Patient presents with    Pre-op Exam       Pt here for Preop today for Laparoscopic left salpingo-oophorectomy due to left ovarian cyst         Abbie Hunt is a 35 y.o. female who presents today for evaluation of complaints as above        Past Medical History        Past Medical History:   Diagnosis Date    Abnormal uterine bleeding (AUB)      Anemia      Cancer ([address] 2022     appendix - surgery    Dysmenorrhea      HSV-1 (herpes simplex virus 1) infection      Hx of trichomoniasis      Infertility, female      Irregular menses      Triplet pregnancy      UTI (urinary tract infection)              Past Surgical History   Past Surgical History:   Procedure Laterality Date    ABDOMINOPLASTY   2018    APPENDECTOMY   2022     SECTION, LOW TRANSVERSE         , ,     COLONOSCOPY N/A 3/16/2022     COLONOSCOPY DIAGNOSTIC performed by Jay Best MD at [address] [place] [address]   2015     ventral hernia    HYSTERECTOMY (CERVIX STATUS UNKNOWN)   2017    LAPAROTOMY N/A 2022     LAPAROTOMY EXPLORATORY APPENDECTOMY RIGHT OOPHERECTOMY FOR OVARIAN TORSION performed by Jay Best MD at [place] [address] LIPECTOMY        SINUS SURGERY   2018    TONSILLECTOMY                Social History            Tobacco Use    Smoking status: Former Smoker       Packs/day: 1.50       Years: 20.00       Pack years: 30.00       Types: Cigarettes       Start date:        Quit date: 2011       Years since quittin.1    Smokeless tobacco: Never Used   Vaping Use    Vaping Use: Never used   Substance Use Topics    Alcohol use: Yes       Comment: occ    Drug use:  No         Family History         Family History   Problem Relation Age of Onset    Other Mother      Heart Disease Father      Heart Attack Father              Current Facility-Administered Medications          Current Outpatient Medications   Medication Sig Dispense Refill    Multiple Vitamins-Minerals (THERAPEUTIC MULTIVITAMIN-MINERALS) tablet Take 1 tablet by mouth daily        Biotin 10 MG CAPS Take by mouth        Cyanocobalamin (VITAMIN B 12 PO) Take by mouth        b complex vitamins capsule Take 1 capsule by mouth daily        ferrous sulfate (IRON 325) 325 (65 Fe) MG tablet Take 1 tablet by mouth daily (with breakfast) 30 tablet 5    Pseudoephedrine HCl (SUDAFED 12 HOUR PO) Take by mouth as needed        ACETAMINOPHEN PO Take by mouth          No current facility-administered medications for this visit.                  Allergies   Allergen Reactions    Amoxicillin Hives    Adhesive Tape Rash       rash         [id number]          Immunization History   Administered Date(s) Administered    COVID-19, J&J, PF, 0.5 mL 05/14/2021    MMR 04/05/2012    Tdap (Boostrix, Adacel) 04/05/2012         Review of Systems  All other systems reviewed and are negative     BP (!) 148/95   Ht 5' 3\" (1.6 m)   Wt 167 lb (75.8 kg)   LMP 10/02/2012   BMI 29.58 kg/m²      Physical Exam     No results found for this visit on 06/16/22.     ASSESSMENT AND PLAN    Diagnosis Orders   1.  Left ovarian cyst         RBAC discussed  Plan lsc lso     Return in about 2 weeks (around 6/30/2022) for postop.     Bryant Bagley MD

## 2022-06-21 NOTE — PROGRESS NOTES
1809 Patient arrived to PACU from OR. Monitors applied and alarms on. No drainage from sites . Report from Myesha. 1817 Patient tolerating ice chips. 1825 Patient repositioned in bed. 1843 Patient transferred out of PACU to same day surgery. Report to Zweemie.

## 2022-06-21 NOTE — OP NOTE
Department of Obstetrics and Gynecology   Brief Operative Report        Pre-operative Diagnosis: Left ovarian cyst  Post-operative Diagnosis:  Same    Procedure: Left salpingo-oophorectomy  Surgeon:  Arlen Henderson MD     Assistant(s): None  Anesthesia: General  Findings: Enlarged left ovary  Estimated blood loss: 10 cc  Specimens: Left ovary and fallopian tube    Complications:  none    Condition:  good    Patient was taken the operating room where general anesthesia was established and found to be adequate. The patient placed in a low lithotomy position utilizing yellowfin stirrups and prepped and draped in usual sterile fashion. Sponge stick was placed in the vagina and bladder was drained with a straight catheter. Care was turned the abdomen where a small supraumbilical skin incision was made in direct view 5 mm trocar and sleeve was serve out complications. CO2 insufflation was initiated and patient was placed in Trendelenburg position applied. 11 mm left lower quadrant port and 5 mm suprapubic ports were placed under direct visualization complications. With a 5 mm blunt tip LigaSure the colon adhesions were taken down around the ovary as well as with EndoShears. Once this was completed the infundibulopelvic ligament was transected and specimen placed in an Endo Catch bag. Cysts were drained with an 11 blade scalpel through the bag and incision. Ovary and tube removed through the left lower quadrant port and handed off for pathology examination. Good hemostasis was noted throughout. 20 cc of 0.5% Marcaine plain was placed within the peritoneal cavity. All CO2 instruments and trochars were removed. Left lower quadrant port was closed with an interrupted suture of 0 Vicryl reapproximating the fascia. Skin edges were approximated with 4-0 Monocryl injected with 0.5% Marcaine with epinephrine sterile bandages applied. Patient tolerated procedure very well. All counts correct and procedure. Patient was taken recovery room in satisfactory condition.

## 2022-06-21 NOTE — ANESTHESIA POSTPROCEDURE EVALUATION
Department of Anesthesiology  Postprocedure Note    Patient: Charly Heller  MRN: [id number]  YOB: 1980  Date of evaluation: 6/21/2022      Procedure Summary     Date: 06/21/22 Room / Location: [address]    Anesthesia Start: 1703 Anesthesia Stop: 1812    Procedure: LEFT SALPINGO OOPHORECTOMY LAPAROSCOPIC (Left ) Diagnosis:       Left ovarian cyst      (Left ovarian cyst [N83.202])    Surgeons: Luther Quinn MD Responsible Provider: Erma Gifford DO    Anesthesia Type: general ASA Status: 2          Anesthesia Type: general    Griffin Phase I: Griffin Score: 10    Griffin Phase II:      Last vitals:   Vitals Value Taken Time   /101 06/21/22 1843   Temp 96.9 °F (36.1 °C) 06/21/22 1809   Pulse 82 06/21/22 1843   Resp 18 06/21/22 1836   SpO2 94 % 06/21/22 1843   Vitals shown include unvalidated device data.       Anesthesia Post Evaluation    Patient location during evaluation: PACU  Patient participation: complete - patient participated  Level of consciousness: sleepy but conscious  Airway patency: patent  Nausea & Vomiting: no nausea  Complications: no  Cardiovascular status: hemodynamically stable  Respiratory status: acceptable  Hydration status: euvolemic

## 2022-07-06 ENCOUNTER — OFFICE VISIT (OUTPATIENT)
Dept: OBGYN | Age: 42
End: 2022-07-06

## 2022-07-06 VITALS
DIASTOLIC BLOOD PRESSURE: 84 MMHG | BODY MASS INDEX: 29.41 KG/M2 | WEIGHT: 166 LBS | SYSTOLIC BLOOD PRESSURE: 130 MMHG | HEIGHT: 63 IN

## 2022-07-06 DIAGNOSIS — Z09 POSTOPERATIVE EXAMINATION: Primary | ICD-10-CM

## 2022-07-06 PROCEDURE — 99024 POSTOP FOLLOW-UP VISIT: CPT | Performed by: OBSTETRICS & GYNECOLOGY

## 2022-07-12 ENCOUNTER — TELEPHONE (OUTPATIENT)
Dept: OBGYN | Age: 42
End: 2022-07-12

## 2022-07-12 NOTE — TELEPHONE ENCOUNTER
Pt states she had L salpingo oophorectomy 6/21/22. Pt states you told her to call if she started having hot flashes and night sweats. Pt is having both and would like to know if something can be called in to help. Pt had post op 7/6/22. What do you recommend?

## 2022-07-18 NOTE — PROGRESS NOTES
Postoperative examination    Patient is doing well. Incisions clean dry and intact. No new complaints or problems. Pain is better.

## 2022-07-20 ENCOUNTER — OFFICE VISIT (OUTPATIENT)
Dept: OBGYN | Age: 42
End: 2022-07-20
Payer: COMMERCIAL

## 2022-07-20 VITALS
SYSTOLIC BLOOD PRESSURE: 128 MMHG | WEIGHT: 166 LBS | BODY MASS INDEX: 29.41 KG/M2 | DIASTOLIC BLOOD PRESSURE: 83 MMHG | HEIGHT: 63 IN

## 2022-07-20 DIAGNOSIS — N89.8 VAGINAL DISCHARGE: Primary | ICD-10-CM

## 2022-07-20 PROCEDURE — 1036F TOBACCO NON-USER: CPT | Performed by: OBSTETRICS & GYNECOLOGY

## 2022-07-20 PROCEDURE — 99213 OFFICE O/P EST LOW 20 MIN: CPT | Performed by: OBSTETRICS & GYNECOLOGY

## 2022-07-20 PROCEDURE — G8427 DOCREV CUR MEDS BY ELIG CLIN: HCPCS | Performed by: OBSTETRICS & GYNECOLOGY

## 2022-07-20 PROCEDURE — G8419 CALC BMI OUT NRM PARAM NOF/U: HCPCS | Performed by: OBSTETRICS & GYNECOLOGY

## 2022-07-20 NOTE — PROGRESS NOTES
22    Ryan Phelan Jade Stephane  1980    Chief Complaint   Patient presents with    Vaginal Discharge    Vaginal Itching     Pt c/o white vaginal discharge and itching x few days. Post-Op Check     Pt here for post-op, oophorectomy/salpingectomy 22. C/o incision is open.          Noam Quintana is a 35 y.o. female who presents today for evaluation of vaginal discharge    Past Medical History:   Diagnosis Date    Abnormal uterine bleeding (AUB)     Anemia     Cancer ([address] 2022    appendix - surgery    Dysmenorrhea     HSV-1 (herpes simplex virus 1) infection     Hx of trichomoniasis     Infertility, female     Irregular menses     Triplet pregnancy     UTI (urinary tract infection)     Vaginal discharge     Vaginal itching        Past Surgical History:   Procedure Laterality Date    ABDOMINOPLASTY  2018    APPENDECTOMY  2022    [facility], LOW TRANSVERSE      , ,     COLONOSCOPY N/A 3/16/2022    COLONOSCOPY DIAGNOSTIC performed by Viki Bell MD at [address] [place] [address]      HERNIA REPAIR  2015    ventral hernia    HYSTERECTOMY ([address] [place] [address])  2017    LAPAROTOMY N/A 2022    LAPAROTOMY EXPLORATORY APPENDECTOMY RIGHT OOPHERECTOMY FOR OVARIAN TORSION performed by Viki Bell MD at [facility] OR    LIPECTOMY      SALPINGO-OOPHORECTOMY Left 2022    LEFT SALPINGO OOPHORECTOMY LAPAROSCOPIC performed by Antonio Morales MD at [address] [place] [address]  2018    TONSILLECTOMY         Social History     Tobacco Use    Smoking status: Former     Packs/day: 1.50     Years: 20.00     Pack years: 30.00     Types: Cigarettes     Start date:      Quit date: 2011     Years since quittin.2    Smokeless tobacco: Never   Vaping Use    Vaping Use: Never used   Substance Use Topics    Alcohol use: Yes     Comment: occ    Drug use: No       Family History   Problem Relation Age of Onset    Other Mother     Heart Disease Father     Heart Attack Father        Current Outpatient Medications   Medication Sig Dispense Refill    estrogens, conjugated, (PREMARIN) 0.3 MG tablet Take 1 tablet by mouth daily 90 tablet 3    Multiple Vitamins-Minerals (THERAPEUTIC MULTIVITAMIN-MINERALS) tablet Take 1 tablet by mouth daily      Biotin 10 MG CAPS Take by mouth      Cyanocobalamin (VITAMIN B 12 PO) Take by mouth      b complex vitamins capsule Take 1 capsule by mouth daily      ferrous sulfate (IRON 325) 325 (65 Fe) MG tablet Take 1 tablet by mouth daily (with breakfast) 30 tablet 5    Pseudoephedrine HCl (SUDAFED 12 HOUR PO) Take by mouth as needed      ACETAMINOPHEN PO Take by mouth       No current facility-administered medications for this visit. Allergies   Allergen Reactions    Amoxicillin Hives    Adhesive Tape Rash     rash           Immunization History   Administered Date(s) Administered    COVID-19, J&J, (age 18y+), IM, 0.5 mL 2021    MMR 2012    Tdap (Boostrix, Adacel) 2012       Review of Systems  All other systems reviewed and are negative    /83   Ht 5' 3\" (1.6 m)   Wt 166 lb (75.3 kg)   LMP 10/02/2012   BMI 29.41 kg/m²     Physical Exam    No results found for this visit on 22. ASSESSMENT AND PLAN   Diagnosis Orders   1. Vaginal discharge  Vaginal Pathogens Probes *A    C.trachomatis N.gonorrhoeae DNA          Return if symptoms worsen or fail to improve.     Daniel Clark MD

## 2022-07-21 LAB
CANDIDA SPECIES, DNA PROBE: NORMAL
GARDNERELLA VAGINALIS, DNA PROBE: NORMAL
TRICHOMONAS VAGINALIS DNA: NORMAL

## 2022-07-22 LAB
C TRACH DNA GENITAL QL NAA+PROBE: NEGATIVE
N. GONORRHOEAE DNA: NEGATIVE

## 2022-08-02 ENCOUNTER — OFFICE VISIT (OUTPATIENT)
Dept: OBGYN | Age: 42
End: 2022-08-02
Payer: COMMERCIAL

## 2022-08-02 VITALS
HEIGHT: 63 IN | BODY MASS INDEX: 30.65 KG/M2 | SYSTOLIC BLOOD PRESSURE: 128 MMHG | DIASTOLIC BLOOD PRESSURE: 83 MMHG | WEIGHT: 173 LBS

## 2022-08-02 DIAGNOSIS — N89.8 VAGINAL DISCHARGE: ICD-10-CM

## 2022-08-02 DIAGNOSIS — N94.10 DYSPAREUNIA, FEMALE: Primary | ICD-10-CM

## 2022-08-02 DIAGNOSIS — N89.8 VAGINAL ITCHING: ICD-10-CM

## 2022-08-02 PROCEDURE — 99213 OFFICE O/P EST LOW 20 MIN: CPT | Performed by: NURSE PRACTITIONER

## 2022-08-02 PROCEDURE — 1036F TOBACCO NON-USER: CPT | Performed by: NURSE PRACTITIONER

## 2022-08-02 PROCEDURE — G8419 CALC BMI OUT NRM PARAM NOF/U: HCPCS | Performed by: NURSE PRACTITIONER

## 2022-08-02 PROCEDURE — G8427 DOCREV CUR MEDS BY ELIG CLIN: HCPCS | Performed by: NURSE PRACTITIONER

## 2022-08-02 ASSESSMENT — ENCOUNTER SYMPTOMS
RESPIRATORY NEGATIVE: 1
GASTROINTESTINAL NEGATIVE: 1

## 2022-08-02 NOTE — PROGRESS NOTES
22    Laverda Rota Boo Cheese  1980    Chief Complaint   Patient presents with    Dyspareunia     Pt c/o burning with penetration with pain and tenderness after and the next day since having left ovary removed. Vaginal Discharge     Pt c/o white thick discharge with itching, denies odor x 1 month.          Tino Hull is a 35 y.o. female who presents today for evaluation of see above    Past Medical History:   Diagnosis Date    Abnormal uterine bleeding (AUB)     Anemia     Cancer ([address] 2022    appendix - surgery    Dysmenorrhea     HSV-1 (herpes simplex virus 1) infection     Hx of trichomoniasis     Infertility, female     Irregular menses     Triplet pregnancy     UTI (urinary tract infection)     Vaginal discharge     Vaginal itching        Past Surgical History:   Procedure Laterality Date    ABDOMINOPLASTY  2018    APPENDECTOMY  2022    [facility] TRANSVERSE      , ,     COLONOSCOPY N/A 3/16/2022    COLONOSCOPY DIAGNOSTIC performed by Niya Perry MD at [address] [place] [address]      HERNIA REPAIR  2015    ventral hernia    HYSTERECTOMY ([address] [place] [address])  2017    LAPAROTOMY N/A 2022    LAPAROTOMY EXPLORATORY APPENDECTOMY RIGHT OOPHERECTOMY FOR OVARIAN TORSION performed by Niya Perry MD at [facility] OR    LIPECTOMY      SALPINGO-OOPHORECTOMY Left 2022    LEFT SALPINGO OOPHORECTOMY LAPAROSCOPIC performed by Bri Lim MD at [address] [place] [address]  2018    TONSILLECTOMY         Social History     Tobacco Use    Smoking status: Former     Packs/day: 1.50     Years: 20.00     Pack years: 30.00     Types: Cigarettes     Start date:      Quit date: 2011     Years since quittin.2    Smokeless tobacco: Never   Vaping Use    Vaping Use: Never used   Substance Use Topics    Alcohol use: Yes     Comment: occ    Drug use: No       Family History   Problem Relation Age of Onset    Other Mother

## 2022-08-03 PROBLEM — B96.89 BV (BACTERIAL VAGINOSIS): Status: ACTIVE | Noted: 2022-08-03

## 2022-08-03 PROBLEM — N76.0 BV (BACTERIAL VAGINOSIS): Status: ACTIVE | Noted: 2022-08-03

## 2022-08-03 PROBLEM — A59.9 TRICHOMONIASIS: Status: ACTIVE | Noted: 2022-08-03

## 2022-08-03 LAB
CANDIDA SPECIES, DNA PROBE: ABNORMAL
GARDNERELLA VAGINALIS, DNA PROBE: ABNORMAL
TRICHOMONAS VAGINALIS DNA: ABNORMAL

## 2022-08-03 RX ORDER — METRONIDAZOLE 500 MG/1
500 TABLET ORAL 2 TIMES DAILY
Qty: 14 TABLET | Refills: 0 | Status: SHIPPED | OUTPATIENT
Start: 2022-08-03 | End: 2022-09-20

## 2022-08-04 LAB
C TRACH DNA GENITAL QL NAA+PROBE: NEGATIVE
N. GONORRHOEAE DNA: NEGATIVE

## 2022-08-09 ENCOUNTER — OFFICE VISIT (OUTPATIENT)
Dept: OBGYN | Age: 42
End: 2022-08-09
Payer: COMMERCIAL

## 2022-08-09 ENCOUNTER — HOSPITAL ENCOUNTER (OUTPATIENT)
Dept: WOMENS IMAGING | Age: 42
Discharge: HOME | End: 2022-08-09
Payer: COMMERCIAL

## 2022-08-09 VITALS
BODY MASS INDEX: 29.95 KG/M2 | HEIGHT: 63 IN | SYSTOLIC BLOOD PRESSURE: 122 MMHG | DIASTOLIC BLOOD PRESSURE: 81 MMHG | WEIGHT: 169 LBS

## 2022-08-09 DIAGNOSIS — Z12.31 ENCOUNTER FOR SCREENING MAMMOGRAM FOR MALIGNANT NEOPLASM OF BREAST: ICD-10-CM

## 2022-08-09 DIAGNOSIS — L24.A9 WOUND DRAINAGE: Primary | ICD-10-CM

## 2022-08-09 PROCEDURE — 99213 OFFICE O/P EST LOW 20 MIN: CPT | Performed by: NURSE PRACTITIONER

## 2022-08-09 PROCEDURE — 77067 SCR MAMMO BI INCL CAD: CPT

## 2022-08-09 PROCEDURE — G8427 DOCREV CUR MEDS BY ELIG CLIN: HCPCS | Performed by: NURSE PRACTITIONER

## 2022-08-09 PROCEDURE — G8417 CALC BMI ABV UP PARAM F/U: HCPCS | Performed by: NURSE PRACTITIONER

## 2022-08-09 PROCEDURE — 1036F TOBACCO NON-USER: CPT | Performed by: NURSE PRACTITIONER

## 2022-08-09 ASSESSMENT — ENCOUNTER SYMPTOMS
RESPIRATORY NEGATIVE: 1
GASTROINTESTINAL NEGATIVE: 1

## 2022-08-09 NOTE — PROGRESS NOTES
22    Rajan Rodrigues  1980    Chief Complaint   Patient presents with    Other     Pt had left laparoscopic salpingo-oophorectomy on 22, Pt c/o red bloody drainage coming from incision.          Izzy Chow is a 35 y.o. female who presents today for evaluation of see above    Past Medical History:   Diagnosis Date    Abnormal uterine bleeding (AUB)     Anemia     Cancer ([address] 2022    appendix - surgery    Dysmenorrhea     HSV-1 (herpes simplex virus 1) infection     Hx of trichomoniasis     Infertility, female     Irregular menses     Triplet pregnancy     UTI (urinary tract infection)     Vaginal discharge     Vaginal itching        Past Surgical History:   Procedure Laterality Date    ABDOMINOPLASTY  2018    APPENDECTOMY  2022    [address] [facility], LOW TRANSVERSE      , ,     COLONOSCOPY N/A 3/16/2022    COLONOSCOPY DIAGNOSTIC performed by Almita Medina MD at [address] [place] [address]  2016    HERNIA REPAIR  2015    ventral hernia    HYSTERECTOMY ([facility])  2017    LAPAROTOMY N/A 2022    LAPAROTOMY EXPLORATORY APPENDECTOMY RIGHT OOPHERECTOMY FOR OVARIAN TORSION performed by Almita Medina MD at [facility] OR    LIPECTOMY      SALPINGO-OOPHORECTOMY Left 2022    LEFT SALPINGO OOPHORECTOMY LAPAROSCOPIC performed by Julio Cesar Morales MD at [address] [place] [address]  2018    TONSILLECTOMY         Social History     Tobacco Use    Smoking status: Former     Packs/day: 1.50     Years: 20.00     Pack years: 30.00     Types: Cigarettes     Start date: 10     Quit date: 2011     Years since quittin.2    Smokeless tobacco: Never   Vaping Use    Vaping Use: Never used   Substance Use Topics    Alcohol use: Yes     Comment: occ    Drug use: No       Family History   Problem Relation Age of Onset    Other Mother     Heart Disease Father     Heart Attack Father     Breast Cancer Neg Hx        Current Outpatient Medications Ventilator check complete; patient has a #8.0 tracheostomy. .  Breath sounds are diminished. Trachea is midline, Negative for subcutaneous air, and chest excursion is symmetric. Patient is also Negative for cyanosis and is Negative for pitting edema. All alarms are set and audible. Resuscitation bag is at the head of the bed. Ventilator Settings  Mode FIO2 Rate Tidal Volume Pressure PEEP I:E Ratio   Pressure support  52 %    450 ml  10 cm H2O  8 cm H20  1:3.4      Peak airway pressure: 16.5 cm H2O   Minute ventilation: 9.2 l/min     ABG: No results for input(s): PH, PCO2, PO2, HCO3 in the last 72 hours.       Scott Luis, RT Medication Sig Dispense Refill    metroNIDAZOLE (FLAGYL) 500 MG tablet Take 1 tablet by mouth in the morning and 1 tablet in the evening. 14 tablet 0    estrogens, conjugated, (PREMARIN) 0.3 MG tablet Take 1 tablet by mouth daily 90 tablet 3    Multiple Vitamins-Minerals (THERAPEUTIC MULTIVITAMIN-MINERALS) tablet Take 1 tablet by mouth daily      Biotin 10 MG CAPS Take by mouth      Cyanocobalamin (VITAMIN B 12 PO) Take by mouth      b complex vitamins capsule Take 1 capsule by mouth daily      ferrous sulfate (IRON 325) 325 (65 Fe) MG tablet Take 1 tablet by mouth daily (with breakfast) 30 tablet 5    Pseudoephedrine HCl (SUDAFED 12 HOUR PO) Take by mouth as needed      ACETAMINOPHEN PO Take by mouth       No current facility-administered medications for this visit. Allergies   Allergen Reactions    Amoxicillin Hives    Adhesive Tape Rash     rash       [id number]    Immunization History   Administered Date(s) Administered    COVID-19, J&J, (age 18y+), IM, 0.5 mL 05/14/2021    MMR 04/05/2012    Tdap (Boostrix, Adacel) 04/05/2012       Review of Systems   Constitutional: Negative. Respiratory: Negative. Gastrointestinal: Negative. Genitourinary: Negative. /81   Ht 5' 3\" (1.6 m)   Wt 169 lb (76.7 kg)   LMP 10/02/2012   BMI 29.94 kg/m²     Physical Exam  Vitals and nursing note reviewed. Constitutional:       Appearance: Normal appearance. HENT:      Head: Normocephalic. Pulmonary:      Effort: Pulmonary effort is normal.   Abdominal:      Comments: .5m superficial opening; scant amts of serosanguinous drainage   Musculoskeletal:         General: Normal range of motion. Cervical back: Normal range of motion. Skin:     General: Skin is warm and dry. Neurological:      Mental Status: She is alert. Psychiatric:         Mood and Affect: Mood normal.       No results found for this visit on 08/09/22. ASSESSMENT AND PLAN   Diagnosis Orders   1.  Wound drainage Incision healing well; keep area clean/dry, topical Bacitracin prn      No follow-ups on file.     Deepika Bautista, APRN - CNP

## 2022-09-13 ENCOUNTER — HOSPITAL ENCOUNTER (OUTPATIENT)
Dept: INFUSION THERAPY | Age: 42
Discharge: HOME | End: 2022-09-13
Payer: COMMERCIAL

## 2022-09-13 DIAGNOSIS — D37.3 LOW GRADE MUCINOUS NEOPLASM OF APPENDIX: ICD-10-CM

## 2022-09-13 LAB
ALBUMIN SERPL-MCNC: 4.2 GM/DL (ref 3.4–5)
ALP BLD-CCNC: 96 IU/L (ref 40–129)
ALT SERPL-CCNC: 18 U/L (ref 10–40)
ANION GAP SERPL CALCULATED.3IONS-SCNC: 8 MMOL/L (ref 4–16)
AST SERPL-CCNC: 25 IU/L (ref 15–37)
BASOPHILS ABSOLUTE: 0 K/CU MM
BASOPHILS RELATIVE PERCENT: 0.7 % (ref 0–1)
BILIRUB SERPL-MCNC: 0.2 MG/DL (ref 0–1)
BUN BLDV-MCNC: 14 MG/DL (ref 6–23)
CALCIUM SERPL-MCNC: 8.9 MG/DL (ref 8.3–10.6)
CHLORIDE BLD-SCNC: 107 MMOL/L (ref 99–110)
CO2: 27 MMOL/L (ref 21–32)
CREAT SERPL-MCNC: 0.7 MG/DL (ref 0.6–1.1)
DIFFERENTIAL TYPE: ABNORMAL
EOSINOPHILS ABSOLUTE: 0.3 K/CU MM
EOSINOPHILS RELATIVE PERCENT: 4.8 % (ref 0–3)
FERRITIN: 65 NG/ML (ref 15–150)
GFR AFRICAN AMERICAN: >60 ML/MIN/1.73M2
GFR NON-AFRICAN AMERICAN: >60 ML/MIN/1.73M2
GLUCOSE BLD-MCNC: 91 MG/DL (ref 70–99)
HCT VFR BLD CALC: 37.2 % (ref 37–47)
HEMOGLOBIN: 11.6 GM/DL (ref 12.5–16)
IMMATURE NEUTROPHIL %: 0.1 % (ref 0–0.43)
IRON: 73 UG/DL (ref 37–145)
LYMPHOCYTES ABSOLUTE: 1.5 K/CU MM
LYMPHOCYTES RELATIVE PERCENT: 22.2 % (ref 24–44)
MCH RBC QN AUTO: 29.8 PG (ref 27–31)
MCHC RBC AUTO-ENTMCNC: 31.2 % (ref 32–36)
MCV RBC AUTO: 95.6 FL (ref 78–100)
MONOCYTES ABSOLUTE: 0.3 K/CU MM
MONOCYTES RELATIVE PERCENT: 4.8 % (ref 0–4)
PCT TRANSFERRIN: 21 % (ref 10–44)
PDW BLD-RTO: 13 % (ref 11.7–14.9)
PLATELET # BLD: 256 K/CU MM (ref 140–440)
PMV BLD AUTO: 9.7 FL (ref 7.5–11.1)
POTASSIUM SERPL-SCNC: 4.8 MMOL/L (ref 3.5–5.1)
RBC # BLD: 3.89 M/CU MM (ref 4.2–5.4)
SEGMENTED NEUTROPHILS ABSOLUTE COUNT: 4.6 K/CU MM
SEGMENTED NEUTROPHILS RELATIVE PERCENT: 67.4 % (ref 36–66)
SODIUM BLD-SCNC: 142 MMOL/L (ref 135–145)
TOTAL IMMATURE NEUTOROPHIL: 0.01 K/CU MM
TOTAL IRON BINDING CAPACITY: 348 UG/DL (ref 250–450)
TOTAL PROTEIN: 6.7 GM/DL (ref 6.4–8.2)
UNSATURATED IRON BINDING CAPACITY: 275 UG/DL (ref 110–370)
VITAMIN D 25-HYDROXY: 28.61 NG/ML
WBC # BLD: 6.7 K/CU MM (ref 4–10.5)

## 2022-09-13 PROCEDURE — 36415 COLL VENOUS BLD VENIPUNCTURE: CPT

## 2022-09-13 PROCEDURE — 86304 IMMUNOASSAY TUMOR CA 125: CPT

## 2022-09-13 PROCEDURE — 80053 COMPREHEN METABOLIC PANEL: CPT

## 2022-09-13 PROCEDURE — 83540 ASSAY OF IRON: CPT

## 2022-09-13 PROCEDURE — 82728 ASSAY OF FERRITIN: CPT

## 2022-09-13 PROCEDURE — 85025 COMPLETE CBC W/AUTO DIFF WBC: CPT

## 2022-09-13 PROCEDURE — 83550 IRON BINDING TEST: CPT

## 2022-09-13 PROCEDURE — 82746 ASSAY OF FOLIC ACID SERUM: CPT

## 2022-09-13 PROCEDURE — 82378 CARCINOEMBRYONIC ANTIGEN: CPT

## 2022-09-13 PROCEDURE — 82306 VITAMIN D 25 HYDROXY: CPT

## 2022-09-13 PROCEDURE — 82607 VITAMIN B-12: CPT

## 2022-09-13 PROCEDURE — 86301 IMMUNOASSAY TUMOR CA 19-9: CPT

## 2022-09-14 LAB
CEA: 1.4 NG/ML
FOLATE: 2.7 NG/ML (ref 3.1–17.5)
VITAMIN B-12: 346.8 PG/ML (ref 211–911)

## 2022-09-15 LAB — CA 125: 10 U/ML

## 2022-09-16 LAB — CA 19-9: 10 U/ML

## 2022-09-20 ENCOUNTER — OFFICE VISIT (OUTPATIENT)
Dept: ONCOLOGY | Age: 42
End: 2022-09-20
Payer: COMMERCIAL

## 2022-09-20 ENCOUNTER — HOSPITAL ENCOUNTER (OUTPATIENT)
Dept: INFUSION THERAPY | Age: 42
Discharge: HOME | End: 2022-09-20
Payer: COMMERCIAL

## 2022-09-20 VITALS
WEIGHT: 165.8 LBS | OXYGEN SATURATION: 96 % | SYSTOLIC BLOOD PRESSURE: 118 MMHG | RESPIRATION RATE: 16 BRPM | DIASTOLIC BLOOD PRESSURE: 77 MMHG | HEART RATE: 77 BPM | HEIGHT: 63 IN | BODY MASS INDEX: 29.38 KG/M2 | TEMPERATURE: 97.8 F

## 2022-09-20 DIAGNOSIS — D37.3 LOW GRADE MUCINOUS NEOPLASM OF APPENDIX: Primary | ICD-10-CM

## 2022-09-20 PROCEDURE — 99213 OFFICE O/P EST LOW 20 MIN: CPT | Performed by: INTERNAL MEDICINE

## 2022-09-20 PROCEDURE — 1036F TOBACCO NON-USER: CPT | Performed by: INTERNAL MEDICINE

## 2022-09-20 PROCEDURE — 99211 OFF/OP EST MAY X REQ PHY/QHP: CPT

## 2022-09-20 PROCEDURE — G8417 CALC BMI ABV UP PARAM F/U: HCPCS | Performed by: INTERNAL MEDICINE

## 2022-09-20 PROCEDURE — G8427 DOCREV CUR MEDS BY ELIG CLIN: HCPCS | Performed by: INTERNAL MEDICINE

## 2022-09-20 RX ORDER — FOLIC ACID 1 MG/1
1 TABLET ORAL DAILY
Qty: 30 TABLET | Refills: 5 | Status: SHIPPED | OUTPATIENT
Start: 2022-09-20 | End: 2022-10-20

## 2022-09-20 RX ORDER — FERROUS SULFATE 325(65) MG
325 TABLET ORAL
Qty: 30 TABLET | Refills: 5 | Status: SHIPPED | OUTPATIENT
Start: 2022-09-20

## 2022-09-20 NOTE — PROGRESS NOTES
Patient Name:  Aysha Dickerson  Patient :  1980  Patient MRN:  [id number]     Primary Oncologist: Adrian Abreu MD  Referring Provider: Waqas Carter MD     Date of Service: 2022      Chief Complaint:    Chief Complaint   Patient presents with    Follow-up     Patient Active Problem List:     Gastroenteritis     Encounter for supervision of normal pregnancy in multigravida     Status post repeat low transverse  section     Ventral hernia     S/P repair of ventral hernia     Abdominal wall mass of epigastric region    HPI:   Sharlene GrantTroy Cota is a 76-year-old very pleasant female with medical history significant for history of obesity, status post gastric bypass surgery in 2016, referred to me on 2022 for 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 She initially presented to [facility] on 2022 with worsening abdominal pain. She required exploratory laparotomy on 2022. She was found to have hemoperitoneum secondary to torsion and ischemic right adnexa and abnormal appendix with mucinous growth at the tip of the appendix. She is status post right oophorectomy and appendicectomy and abdominal washout. 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 1 small benign mesoappendiceal lymph node. Tumor size was 2.9 x 2 x 1 cm. Acellular mucin invades subserosa or mesoappendix but does not extend to the serosal surface. All margins negative for noninvasive tumor and mucin. Proximal mucosal margin uninvolved and approximately at least 5 cm away. Mesenteric margins is also uninvolved and approximately 1.5 cm away. There is no lymphovascular invasion, perineural invasion or tumor deposits [pT3 (LAMN with acellular mucin or mucinous epithelium that extends into the subserosa), pN0]. Since she is found to have LAMN, she was referred to me for further evaluation.      Since she only has pT3 low grade appendicular mucinous neoplasia and she is status post appendicectomy with negative surgical margins, I recommend for close observation. I recommend to regularly check CEA, CA 19-9 and  level in future to monitor disease progression. Colorectal cancer and other tumors involving the gastrointestinal tract, endometrium, ovary, breast, and kidney have been reported in patients with appendiceal mucinous lesions. Recommend to have screening colonoscopy, regular GYN follow up and mammogram.     Had colonoscopy on 3/16/2022 by Dr. Dennie Britain and it only showed internal hemorrhoids. She underwent left salpingo-oophorectomy on 22. On 2022, she presented to me for follow up. I have been following her for pT3 low-grade mucinous appendiceal neoplasm and she is status post surgery. She has been under close observation since then. Laboratory work-ups on 2022 showed mild elevation in  and iron deficiency. Repeat labs on 22 showed normal , CEA and CA 19-9. Her iron deficiency is getting better. But she has folate deficiency. She was seen by Dr. Stef haines and she is s/p left salphigo oophorectomy on 22. She is also s/p hysterecotmy and right salpingo oophorectomy. Recommend to f/u with GYN regularly. Mammogram done on 22 showed no evidence of malignancy. Already had colonoscopy on 3/16/22. Recommend to continue with FeSO4. I will start folic acid today. I will check her blood tests, including iron study and tumor marker in 6 months. She doesn't have any significant symptoms at today visit. Past Medical History:     Significant for  1. History of obesity status post gastric bypass surgery in 2016    Past Surgery History:    Significant for  1. Gastric bypass surgery in   2. Sinus surgery in   3. Tonsillectomy in   4. Hernia repair in     5. Abdominoplasty in   6.  section in ,  and   7.   Endometrial noted  ABDOMEN: normal bowel sounds x 4, soft, non-distended, non-tender, no masses palpated, no hepatosplenomegaly   MUSCULOSKELETAL: full range of motion noted, tone is normal  NEUROLOGIC: awake, alert, oriented to name, place and time. Motor skills grossly intact. SKIN: appears intact, normal skin color, normal texture, normal turgor, no jaundice.    EXTREMITIES: no LE edema, no clubbing, no leg swelling, no cyanosis,       Labs:  Hematology:  Lab Results   Component Value Date    WBC 6.7 09/13/2022    RBC 3.89 (L) 09/13/2022    HGB 11.6 (L) 09/13/2022    HCT 37.2 09/13/2022    MCV 95.6 09/13/2022    MCH 29.8 09/13/2022    MCHC 31.2 (L) 09/13/2022    RDW 13.0 09/13/2022     09/13/2022    MPV 9.7 09/13/2022    SEGSPCT 67.4 (H) 09/13/2022    EOSRELPCT 4.8 (H) 09/13/2022    BASOPCT 0.7 09/13/2022    LYMPHOPCT 22.2 (L) 09/13/2022    MONOPCT 4.8 (H) 09/13/2022    SEGSABS 4.6 09/13/2022    EOSABS 0.3 09/13/2022    BASOSABS 0.0 09/13/2022    LYMPHSABS 1.5 09/13/2022    MONOSABS 0.3 09/13/2022    DIFFTYPE AUTOMATED DIFFERENTIAL 09/13/2022     Lab Results   Component Value Date    ESR 1 02/11/2022     Chemistry:  Lab Results   Component Value Date     09/13/2022    K 4.8 09/13/2022     09/13/2022    CO2 27 09/13/2022    BUN 14 09/13/2022    CREATININE 0.7 09/13/2022    GLUCOSE 91 09/13/2022    CALCIUM 8.9 09/13/2022    PROT 6.7 09/13/2022    LABALBU 4.2 09/13/2022    BILITOT 0.2 09/13/2022    ALKPHOS 96 09/13/2022    AST 25 09/13/2022    ALT 18 09/13/2022    LABGLOM >60 09/13/2022    GFRAA >60 09/13/2022    PHOS 3.7 10/21/2016    MG 1.8 10/21/2016     Lab Results   Component Value Date     02/11/2022     No components found for: LD  Lab Results   Component Value Date    TSHHS 2.050 10/21/2016    T4FREE 1.07 10/21/2016     Immunology:  Lab Results   Component Value Date    PROT 6.7 09/13/2022     No results found for: Cloyde Bohr, KLFLCR  No results found for: B2M  Coagulation Panel:  No results found for: PROTIME, INR, APTT, DDIMER  Anemia Panel:  Lab Results   Component Value Date    JDDVEYGI04 346.8 09/13/2022    FOLATE 2.7 (L) 09/13/2022     Tumor Markers:  Lab Results   Component Value Date     10 09/13/2022    CEA 1.4 09/13/2022     10 09/13/2022        Observations:  No data recorded     Assessment   Low-grade appendiceal mucinous neoplasm (LAMN)    Plan:                                                                                                         Vivian Manning is a 77-year-old very pleasant female who initially presented on 1/24/2022 with worsening abdominal pain. Exploratory laparotomy on 1/24/2022 showed hemoperitoneum secondary to torsion & ischemic right adnexa and abnormal appendix with mucinous growth at the tip of the appendix. She is status post right oophorectomy, appendicectomy and abdominal washout. 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 Tumor size was 2.9 x 2 x 1 cm. Acellular mucin invades subserosa or mesoappendix but does not extend to the serosal surface. All margins negative for noninvasive tumor and mucin. Proximal mucosal margin uninvolved and approximately at least 5 cm away. Mesenteric margins is also uninvolved and approximately 1.5 cm away. There is no lymphovascular invasion, perineural invasion or tumor deposits [pT3 (LAMN with acellular mucin or mucinous epithelium that extends into the subserosa), pN0]. Since she only has pT3 low grade appendicular mucinous neoplasia and she is status post appendicectomy with negative surgical margins, I recommend for close observation. I recommend to regularly check CEA, CA 19-9 and  level in future to monitor disease progression. Colorectal cancer and other tumors involving the gastrointestinal tract, endometrium, ovary, breast, and kidney have been reported in patients with appendiceal mucinous lesions.     Recommend to have screening colonoscopy, regular GYN follow up and mammogram.     Had colonoscopy on 3/16/2022 by Dr. Jeff Steel and it only showed internal hemorrhoids. She underwent left salpingo-oophorectomy on 6/21/22. On September 20, 2022, she presented to me for follow up. I have been following her for pT3 low-grade mucinous appendiceal neoplasm and she is status post surgery. She has been under close observation since then. Laboratory work-ups on 2/11/2022 showed mild elevation in  and iron deficiency. Repeat labs on 9/13/22 showed normal , CEA and CA 19-9. Her iron deficiency is getting better. But she has folate deficiency. She was seen by Dr. Nicola Lamb soon and she is s/p left salphigo oophorectomy on 6/21/22. She is also s/p hysterecotmy and right salpingo oophorectomy. Recommend to f/u with GYN regularly. Mammogram done on 8/9/22 showed no evidence of malignancy. Already had colonoscopy on 3/16/22. Recommend to continue with FeSO4. I will start folic acid today. I will check her blood tests, including iron study and tumor marker in 6 months. I answered all her questions and concerns for today. Recent imaging and labs were reviewed and discussed with the patient.

## 2022-09-20 NOTE — PROGRESS NOTES
MA Rooming Questions  Patient: Gucci Xiao  MRN: [id number]    Date: 9/20/2022        1. Do you have any new issues? Yes- bruising easy everywhere         2. Do you need any refills on medications?    no    3. Have you had any imaging done since your last visit? yes - Mammo    4. Have you been hospitalized or seen in the emergency room since your last visit here?   no    5. Did the patient have a depression screening completed today?  No    No data recorded     PHQ-9 Given to (if applicable):               PHQ-9 Score (if applicable):                     [] Positive     []  Negative              Does question #9 need addressed (if applicable)                     [] Yes    []  No               Rajinder Certain, CMA

## 2023-01-11 ENCOUNTER — OFFICE VISIT (OUTPATIENT)
Dept: OBGYN | Age: 43
End: 2023-01-11
Payer: COMMERCIAL

## 2023-01-11 VITALS
HEIGHT: 63 IN | BODY MASS INDEX: 29.95 KG/M2 | HEART RATE: 62 BPM | DIASTOLIC BLOOD PRESSURE: 80 MMHG | SYSTOLIC BLOOD PRESSURE: 119 MMHG | WEIGHT: 169 LBS

## 2023-01-11 DIAGNOSIS — N89.8 VAGINAL DISCHARGE: Primary | ICD-10-CM

## 2023-01-11 PROCEDURE — 1036F TOBACCO NON-USER: CPT

## 2023-01-11 PROCEDURE — G8484 FLU IMMUNIZE NO ADMIN: HCPCS

## 2023-01-11 PROCEDURE — G8417 CALC BMI ABV UP PARAM F/U: HCPCS

## 2023-01-11 PROCEDURE — G8427 DOCREV CUR MEDS BY ELIG CLIN: HCPCS

## 2023-01-11 PROCEDURE — 99213 OFFICE O/P EST LOW 20 MIN: CPT

## 2023-01-11 RX ORDER — FOLIC ACID 1 MG/1
1 TABLET ORAL DAILY
COMMUNITY

## 2023-01-11 ASSESSMENT — PATIENT HEALTH QUESTIONNAIRE - PHQ9
SUM OF ALL RESPONSES TO PHQ QUESTIONS 1-9: 0
1. LITTLE INTEREST OR PLEASURE IN DOING THINGS: 0
SUM OF ALL RESPONSES TO PHQ9 QUESTIONS 1 & 2: 0
SUM OF ALL RESPONSES TO PHQ QUESTIONS 1-9: 0
2. FEELING DOWN, DEPRESSED OR HOPELESS: 0
SUM OF ALL RESPONSES TO PHQ QUESTIONS 1-9: 0
SUM OF ALL RESPONSES TO PHQ QUESTIONS 1-9: 0

## 2023-01-11 ASSESSMENT — ENCOUNTER SYMPTOMS
RESPIRATORY NEGATIVE: 1
GASTROINTESTINAL NEGATIVE: 1
ABDOMINAL PAIN: 0

## 2023-01-11 NOTE — PROGRESS NOTES
23    Devante Clancy  1980    Chief Complaint   Patient presents with    Vaginal Discharge     Pt c/o white vaginal discharge x, 1 wk.  states itching stopped x 4 days. Adeline Gonsalez is a 38 y.o. female who presents today for evaluation of vaginal discharge x 1 week. Pt states she had associated pruritis as well that has since resolved. She states she has tried otc monistat without significant relief. Pt reports falling asleep in the bubble bath recently, thinks that triggered her current symptoms.     Past Medical History:   Diagnosis Date    Abnormal uterine bleeding (AUB)     Anemia     Cancer ([address] 2022    appendix - surgery    Dysmenorrhea     HSV-1 (herpes simplex virus 1) infection     Hx of trichomoniasis     Infertility, female     Irregular menses     Triplet pregnancy     UTI (urinary tract infection)     Vaginal discharge     Vaginal itching        Past Surgical History:   Procedure Laterality Date    ABDOMINOPLASTY  2018    APPENDECTOMY  2022    [address], LOW TRANSVERSE      , ,     COLONOSCOPY N/A 3/16/2022    COLONOSCOPY DIAGNOSTIC performed by Jonel Garcia MD at [address] [place] [address]  2016    HERNIA REPAIR  2015    ventral hernia    HYSTERECTOMY (CERVIX STATUS UNKNOWN)  2017    LAPAROTOMY N/A 2022    LAPAROTOMY EXPLORATORY APPENDECTOMY RIGHT OOPHERECTOMY FOR OVARIAN TORSION performed by Jonel Garcia MD at [facility] OR    LIPECTOMY      SALPINGO-OOPHORECTOMY Left 2022    LEFT SALPINGO OOPHORECTOMY LAPAROSCOPIC performed by Wilner Bansk MD at [address] [place] [address]  2018    TONSILLECTOMY         Social History     Tobacco Use    Smoking status: Former     Packs/day: 1.50     Years: 20.00     Pack years: 30.00     Types: Cigarettes     Start date:      Quit date: 2011     Years since quittin.6    Smokeless tobacco: Never   Vaping Use    Vaping Use: Never used   Substance Use Topics Alcohol use: Yes     Comment: occ    Drug use: No       Family History   Problem Relation Age of Onset    Other Mother     Heart Disease Father     Heart Attack Father     Breast Cancer Neg Hx        Current Outpatient Medications   Medication Sig Dispense Refill    folic acid (FOLVITE) 1 MG tablet Take 1 mg by mouth daily      LISINOPRIL-HYDROCHLOROTHIAZIDE PO Take by mouth      ferrous sulfate (IRON 325) 325 (65 Fe) MG tablet Take 1 tablet by mouth daily (with breakfast) 30 tablet 5    estrogens, conjugated, (PREMARIN) 0.3 MG tablet Take 1 tablet by mouth daily 90 tablet 3    Multiple Vitamins-Minerals (THERAPEUTIC MULTIVITAMIN-MINERALS) tablet Take 1 tablet by mouth daily      Biotin 10 MG CAPS Take by mouth      Cyanocobalamin (VITAMIN B 12 PO) Take by mouth      b complex vitamins capsule Take 1 capsule by mouth daily      Pseudoephedrine HCl (SUDAFED 12 HOUR PO) Take by mouth as needed      ACETAMINOPHEN PO Take by mouth      folic acid (FOLVITE) 1 MG tablet Take 1 tablet by mouth daily 30 tablet 5     No current facility-administered medications for this visit. Allergies   Allergen Reactions    Amoxicillin Hives    Adhesive Tape Rash     rash       [id number]    Immunization History   Administered Date(s) Administered    COVID-19, J&J, (age 18y+), IM, 0.5 mL 05/14/2021    MMR 04/05/2012    Tdap (Boostrix, Adacel) 04/05/2012       Review of Systems   Constitutional: Negative. Negative for fatigue and fever. Respiratory: Negative. Gastrointestinal: Negative. Negative for abdominal pain. Endocrine: Negative. Genitourinary:  Positive for vaginal discharge. Negative for dysuria, frequency, menstrual problem, pelvic pain, vaginal bleeding and vaginal pain. Musculoskeletal: Negative. Skin: Negative. Neurological: Negative. Negative for dizziness and headaches. Psychiatric/Behavioral: Negative.        /80 (Site: Right Upper Arm, Position: Sitting, Cuff Size: Large Adult)   Pulse 62 Ht 5' 3\" (1.6 m)   Wt 169 lb (76.7 kg)   LMP 10/02/2012   BMI 29.94 kg/m²     Physical Exam  Vitals and nursing note reviewed. Constitutional:       General: She is not in acute distress. Appearance: Normal appearance. She is normal weight. HENT:      Head: Normocephalic and atraumatic. Pulmonary:      Effort: Pulmonary effort is normal. No respiratory distress. Genitourinary:     General: Normal vulva. Exam position: Lithotomy position. Vagina: Vaginal discharge (white, thick) present. Musculoskeletal:         General: Normal range of motion. Cervical back: Normal range of motion. Skin:     General: Skin is warm and dry. Neurological:      General: No focal deficit present. Mental Status: She is alert and oriented to person, place, and time. Psychiatric:         Mood and Affect: Mood normal.         Speech: Speech normal.         Behavior: Behavior normal. Behavior is cooperative. Thought Content: Thought content normal.       No results found for this visit on 01/11/23. ASSESSMENT AND PLAN   Diagnosis Orders   1. Vaginal discharge  Vaginal Pathogens Probes *A        Pt denies concern for STDs    BV panel, will wait for results to send medication. No other issues today    Return if symptoms worsen or fail to improve.     Omi Wayne PA-C

## 2023-01-12 ENCOUNTER — TELEPHONE (OUTPATIENT)
Dept: OBGYN | Age: 43
End: 2023-01-12

## 2023-01-12 DIAGNOSIS — A59.01 TRICHOMONAS VAGINITIS: Primary | ICD-10-CM

## 2023-01-12 RX ORDER — METRONIDAZOLE 500 MG/1
500 TABLET ORAL 2 TIMES DAILY
Qty: 14 TABLET | Refills: 0 | Status: SHIPPED | OUTPATIENT
Start: 2023-01-12 | End: 2023-01-19

## 2023-01-13 NOTE — TELEPHONE ENCOUNTER
Pt informed it was okay to take 4 tablets okay by Lynne Dueñas. But it is recommended to take it for 7 days.

## 2023-02-15 ENCOUNTER — OFFICE VISIT (OUTPATIENT)
Dept: OBGYN | Age: 43
End: 2023-02-15

## 2023-02-15 VITALS
HEIGHT: 63 IN | SYSTOLIC BLOOD PRESSURE: 126 MMHG | DIASTOLIC BLOOD PRESSURE: 85 MMHG | WEIGHT: 174 LBS | BODY MASS INDEX: 30.83 KG/M2

## 2023-02-15 DIAGNOSIS — Z86.19 HISTORY OF TRICHOMONAL VAGINITIS: Primary | ICD-10-CM

## 2023-02-15 DIAGNOSIS — Z11.3 SCREENING EXAMINATION FOR STD (SEXUALLY TRANSMITTED DISEASE): ICD-10-CM

## 2023-02-15 ASSESSMENT — ENCOUNTER SYMPTOMS
RESPIRATORY NEGATIVE: 1
GASTROINTESTINAL NEGATIVE: 1
ABDOMINAL PAIN: 0

## 2023-02-15 NOTE — PROGRESS NOTES
2/15/23    Hugh Lobato  1980    Chief Complaint   Patient presents with    Other     Pt tested positive for trich on 2023. Pt states she completed meds no sx. Zachariah Maddox is a 38 y.o. female who presents today for evaluation of manju for positive trichomonas 23. She denies discharge or symptoms today, states she completed antibiotic course in full.      Past Medical History:   Diagnosis Date    Abnormal uterine bleeding (AUB)     Anemia     Cancer ([address] 2022    appendix - surgery    Dysmenorrhea     HSV-1 (herpes simplex virus 1) infection     Hx of trichomoniasis     Infertility, female     Irregular menses     Triplet pregnancy     UTI (urinary tract infection)     Vaginal discharge     Vaginal itching        Past Surgical History:   Procedure Laterality Date    ABDOMINOPLASTY  2018    APPENDECTOMY  2022    [address] [place] [address], [facility] TRANSVERSE      , ,     COLONOSCOPY N/A 3/16/2022    COLONOSCOPY DIAGNOSTIC performed by Niranjan Adams MD at [facility]  2016    HERNIA REPAIR  2015    ventral hernia    HYSTERECTOMY ([facility])  2017    LAPAROTOMY N/A 2022    LAPAROTOMY EXPLORATORY APPENDECTOMY RIGHT OOPHERECTOMY FOR OVARIAN TORSION performed by Niranjan Adams MD at [facility] OR    LIPECTOMY      SALPINGO-OOPHORECTOMY Left 2022    LEFT SALPINGO OOPHORECTOMY LAPAROSCOPIC performed by Carlos Larry MD at [facility]  2018    TONSILLECTOMY         Social History     Tobacco Use    Smoking status: Former     Packs/day: 1.50     Years: 20.00     Pack years: 30.00     Types: Cigarettes     Start date: 10     Quit date: 2011     Years since quittin.7    Smokeless tobacco: Never   Vaping Use    Vaping Use: Never used   Substance Use Topics    Alcohol use: Yes     Comment: occ    Drug use: No       Family History   Problem Relation Age of Onset    Other Mother     Heart Disease Father     Heart Attack Father     Breast Cancer Neg Hx        Current Outpatient Medications   Medication Sig Dispense Refill    folic acid (FOLVITE) 1 MG tablet Take 1 mg by mouth daily      LISINOPRIL-HYDROCHLOROTHIAZIDE PO Take by mouth      folic acid (FOLVITE) 1 MG tablet Take 1 tablet by mouth daily 30 tablet 5    ferrous sulfate (IRON 325) 325 (65 Fe) MG tablet Take 1 tablet by mouth daily (with breakfast) 30 tablet 5    estrogens, conjugated, (PREMARIN) 0.3 MG tablet Take 1 tablet by mouth daily 90 tablet 3    Multiple Vitamins-Minerals (THERAPEUTIC MULTIVITAMIN-MINERALS) tablet Take 1 tablet by mouth daily      Biotin 10 MG CAPS Take by mouth      Cyanocobalamin (VITAMIN B 12 PO) Take by mouth      b complex vitamins capsule Take 1 capsule by mouth daily      Pseudoephedrine HCl (SUDAFED 12 HOUR PO) Take by mouth as needed      ACETAMINOPHEN PO Take by mouth       No current facility-administered medications for this visit. Allergies   Allergen Reactions    Amoxicillin Hives    Adhesive Tape Rash     rash       [id number]    Immunization History   Administered Date(s) Administered    COVID-19, J&J, (age 18y+), IM, 0.5 mL 05/14/2021    MMR 04/05/2012    Tdap (Boostrix, Adacel) 04/05/2012       Review of Systems   Constitutional: Negative. Negative for fatigue and fever. Respiratory: Negative. Gastrointestinal: Negative. Negative for abdominal pain. Endocrine: Negative. Genitourinary: Negative. Negative for dysuria, frequency, menstrual problem, pelvic pain, vaginal discharge and vaginal pain. Musculoskeletal: Negative. Skin: Negative. Neurological: Negative. Negative for dizziness and headaches. Psychiatric/Behavioral: Negative. /85 (Site: Left Upper Arm, Position: Sitting, Cuff Size: Large Adult)   Ht 5' 3\" (1.6 m)   Wt 174 lb (78.9 kg)   LMP 10/02/2012   BMI 30.82 kg/m²     Physical Exam  Vitals and nursing note reviewed.    Constitutional:       General: She is not in acute distress. Appearance: Normal appearance. She is normal weight. HENT:      Head: Normocephalic and atraumatic. Pulmonary:      Effort: Pulmonary effort is normal. No respiratory distress. Abdominal:      Palpations: Abdomen is soft. Tenderness: There is no abdominal tenderness. Genitourinary:     General: Normal vulva. Exam position: Lithotomy position. Vagina: Normal.      Cervix: Normal.      Uterus: Normal.       Adnexa: Right adnexa normal and left adnexa normal.   Musculoskeletal:         General: Normal range of motion. Cervical back: Normal range of motion. Skin:     General: Skin is warm and dry. Neurological:      General: No focal deficit present. Mental Status: She is alert and oriented to person, place, and time. Psychiatric:         Mood and Affect: Mood normal.         Speech: Speech normal.         Behavior: Behavior normal.         Thought Content: Thought content normal.       No results found for this visit on 02/15/23. ASSESSMENT AND PLAN   Diagnosis Orders   1. History of trichomonal vaginitis  Vaginal Pathogens Probes *A      2. Screening examination for STD (sexually transmitted disease)  C.trachomatis N.gonorrhoeae DNA        Swabs, will contact with any abnormal result    No other issues/concerns today    Return if symptoms worsen or fail to improve.     Jorge A Lerner PA-C

## 2023-02-17 LAB
C TRACH DNA GENITAL QL NAA+PROBE: NEGATIVE
N. GONORRHOEAE DNA: NEGATIVE

## 2023-03-14 ENCOUNTER — HOSPITAL ENCOUNTER (OUTPATIENT)
Dept: INFUSION THERAPY | Age: 43
Discharge: HOME | End: 2023-03-14
Payer: COMMERCIAL

## 2023-03-14 DIAGNOSIS — D37.3 LOW GRADE MUCINOUS NEOPLASM OF APPENDIX: ICD-10-CM

## 2023-03-14 LAB
ALBUMIN SERPL-MCNC: 4.2 GM/DL (ref 3.4–5)
ALP BLD-CCNC: 94 IU/L (ref 40–128)
ALT SERPL-CCNC: 13 U/L (ref 10–40)
ANION GAP SERPL CALCULATED.3IONS-SCNC: 8 MMOL/L (ref 4–16)
AST SERPL-CCNC: 19 IU/L (ref 15–37)
BASOPHILS ABSOLUTE: 0 K/CU MM
BASOPHILS RELATIVE PERCENT: 0.5 % (ref 0–1)
BILIRUB SERPL-MCNC: 0.2 MG/DL (ref 0–1)
BUN SERPL-MCNC: 12 MG/DL (ref 6–23)
CALCIUM SERPL-MCNC: 9.2 MG/DL (ref 8.3–10.6)
CEA: 1.8 NG/ML (ref 0–5)
CHLORIDE BLD-SCNC: 102 MMOL/L (ref 99–110)
CO2: 29 MMOL/L (ref 21–32)
CREAT SERPL-MCNC: 0.6 MG/DL (ref 0.6–1.1)
DIFFERENTIAL TYPE: ABNORMAL
EOSINOPHILS ABSOLUTE: 0.2 K/CU MM
EOSINOPHILS RELATIVE PERCENT: 2.6 % (ref 0–3)
FERRITIN: 64 NG/ML (ref 15–150)
FOLATE SERPL-MCNC: >20 NG/ML (ref 3.1–17.5)
GFR SERPL CREATININE-BSD FRML MDRD: >60 ML/MIN/1.73M2
GLUCOSE SERPL-MCNC: 81 MG/DL (ref 70–99)
HCT VFR BLD CALC: 38.5 % (ref 37–47)
HEMOGLOBIN: 12 GM/DL (ref 12.5–16)
IRON: 53 UG/DL (ref 37–145)
LYMPHOCYTES ABSOLUTE: 1.9 K/CU MM
LYMPHOCYTES RELATIVE PERCENT: 22.1 % (ref 24–44)
MCH RBC QN AUTO: 29.9 PG (ref 27–31)
MCHC RBC AUTO-ENTMCNC: 31.2 % (ref 32–36)
MCV RBC AUTO: 96 FL (ref 78–100)
MONOCYTES ABSOLUTE: 0.5 K/CU MM
MONOCYTES RELATIVE PERCENT: 5.9 % (ref 0–4)
PCT TRANSFERRIN: 16 % (ref 10–44)
PDW BLD-RTO: 11.8 % (ref 11.7–14.9)
PLATELET # BLD: 276 K/CU MM (ref 140–440)
PMV BLD AUTO: 9.5 FL (ref 7.5–11.1)
POTASSIUM SERPL-SCNC: 4 MMOL/L (ref 3.5–5.1)
RBC # BLD: 4.01 M/CU MM (ref 4.2–5.4)
SEGMENTED NEUTROPHILS ABSOLUTE COUNT: 5.9 K/CU MM
SEGMENTED NEUTROPHILS RELATIVE PERCENT: 68.9 % (ref 36–66)
SODIUM BLD-SCNC: 139 MMOL/L (ref 135–145)
TOTAL IRON BINDING CAPACITY: 337 UG/DL (ref 250–450)
TOTAL PROTEIN: 6.6 GM/DL (ref 6.4–8.2)
UNSATURATED IRON BINDING CAPACITY: 284 UG/DL (ref 110–370)
VITAMIN B-12: 279.7 PG/ML (ref 211–911)
WBC # BLD: 8.6 K/CU MM (ref 4–10.5)

## 2023-03-14 PROCEDURE — 86301 IMMUNOASSAY TUMOR CA 19-9: CPT

## 2023-03-14 PROCEDURE — 82728 ASSAY OF FERRITIN: CPT

## 2023-03-14 PROCEDURE — 83540 ASSAY OF IRON: CPT

## 2023-03-14 PROCEDURE — 82746 ASSAY OF FOLIC ACID SERUM: CPT

## 2023-03-14 PROCEDURE — 36415 COLL VENOUS BLD VENIPUNCTURE: CPT

## 2023-03-14 PROCEDURE — 82378 CARCINOEMBRYONIC ANTIGEN: CPT

## 2023-03-14 PROCEDURE — 82607 VITAMIN B-12: CPT

## 2023-03-14 PROCEDURE — 83550 IRON BINDING TEST: CPT

## 2023-03-14 PROCEDURE — 85025 COMPLETE CBC W/AUTO DIFF WBC: CPT

## 2023-03-14 PROCEDURE — 80053 COMPREHEN METABOLIC PANEL: CPT

## 2023-03-14 PROCEDURE — 86304 IMMUNOASSAY TUMOR CA 125: CPT

## 2023-03-16 LAB
CANCER AG125 SERPL-ACNC: 10 U/ML
CANCER AG19-9 SERPL IA-ACNC: 8 U/ML

## 2023-03-19 PROBLEM — D37.3 LOW GRADE MUCINOUS NEOPLASM OF APPENDIX: Status: ACTIVE | Noted: 2023-03-19

## 2023-03-19 NOTE — PROGRESS NOTES
Patient Name:  Omayra Dowling  Patient :  1980  Patient MRN:  [id number]     Primary Oncologist: Sawyer Grewal MD  Referring Provider: Ciara Bishop MD     Date of Service: 3/21/2023      Chief Complaint:    Chief Complaint   Patient presents with    Follow-up     Patient Active Problem List:     Gastroenteritis     Encounter for supervision of normal pregnancy in multigravida     Status post repeat low transverse  section     Ventral hernia     S/P repair of ventral hernia     Abdominal wall mass of epigastric region    HPI:   Selena Marina. 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 She initially presented to [facility] on 2022 with worsening abdominal pain. She required exploratory laparotomy on 2022. She was found to have hemoperitoneum secondary to torsion and ischemic right adnexa and abnormal appendix with mucinous growth at the tip of the appendix. She is status post right oophorectomy and appendicectomy and abdominal washout. 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 1 small benign mesoappendiceal lymph node. Tumor size was 2.9 x 2 x 1 cm. Acellular mucin invades subserosa or mesoappendix but does not extend to the serosal surface. All margins negative for noninvasive tumor and mucin. Proximal mucosal margin uninvolved and approximately at least 5 cm away. Mesenteric margins is also uninvolved and approximately 1.5 cm away. There is no lymphovascular invasion, perineural invasion or tumor deposits [pT3 (LAMN with acellular mucin or mucinous epithelium that extends into the subserosa), pN0]. Since she is found to have LAMN, she was referred to me for further evaluation.      Since she only has pT3 low grade appendicular

## 2023-03-21 ENCOUNTER — HOSPITAL ENCOUNTER (OUTPATIENT)
Dept: INFUSION THERAPY | Age: 43
Discharge: HOME | End: 2023-03-21
Payer: COMMERCIAL

## 2023-03-21 ENCOUNTER — OFFICE VISIT (OUTPATIENT)
Dept: ONCOLOGY | Age: 43
End: 2023-03-21
Payer: COMMERCIAL

## 2023-03-21 VITALS
RESPIRATION RATE: 16 BRPM | BODY MASS INDEX: 29.84 KG/M2 | WEIGHT: 168.4 LBS | HEART RATE: 90 BPM | OXYGEN SATURATION: 95 % | TEMPERATURE: 97.7 F | HEIGHT: 63 IN | SYSTOLIC BLOOD PRESSURE: 109 MMHG | DIASTOLIC BLOOD PRESSURE: 66 MMHG

## 2023-03-21 DIAGNOSIS — D37.3 LOW GRADE MUCINOUS NEOPLASM OF APPENDIX: Primary | ICD-10-CM

## 2023-03-21 PROCEDURE — G8427 DOCREV CUR MEDS BY ELIG CLIN: HCPCS | Performed by: INTERNAL MEDICINE

## 2023-03-21 PROCEDURE — 99211 OFF/OP EST MAY X REQ PHY/QHP: CPT

## 2023-03-21 PROCEDURE — 99213 OFFICE O/P EST LOW 20 MIN: CPT | Performed by: INTERNAL MEDICINE

## 2023-03-21 PROCEDURE — G8417 CALC BMI ABV UP PARAM F/U: HCPCS | Performed by: INTERNAL MEDICINE

## 2023-03-21 PROCEDURE — G8484 FLU IMMUNIZE NO ADMIN: HCPCS | Performed by: INTERNAL MEDICINE

## 2023-03-21 PROCEDURE — 1036F TOBACCO NON-USER: CPT | Performed by: INTERNAL MEDICINE

## 2023-03-21 NOTE — PROGRESS NOTES
MA Rooming Questions  Patient: Renata Kulkarni  MRN: [id number]    Date: 3/21/2023        1. Do you have any new issues?   no         2. Do you need any refills on medications?    no    3. Have you had any imaging done since your last visit?   no    4. Have you been hospitalized or seen in the emergency room since your last visit here?   no    5. Did the patient have a depression screening completed today?  No    No data recorded     PHQ-9 Given to (if applicable):               PHQ-9 Score (if applicable):                     [] Positive     []  Negative              Does question #9 need addressed (if applicable)                     [] Yes    []  No               Crist Cogan, MA

## 2023-05-01 RX ORDER — FOLIC ACID 1 MG/1
TABLET ORAL
Qty: 30 TABLET | Refills: 0 | Status: SHIPPED | OUTPATIENT
Start: 2023-05-01

## 2023-05-30 RX ORDER — FOLIC ACID 1 MG/1
TABLET ORAL
Qty: 30 TABLET | Refills: 0 | Status: SHIPPED | OUTPATIENT
Start: 2023-05-30

## 2023-06-26 RX ORDER — FOLIC ACID 1 MG/1
TABLET ORAL
Qty: 30 TABLET | Refills: 3 | Status: SHIPPED | OUTPATIENT
Start: 2023-06-26

## 2023-08-03 RX ORDER — FERROUS SULFATE 325(65) MG
TABLET ORAL
Qty: 30 TABLET | Refills: 0 | Status: SHIPPED | OUTPATIENT
Start: 2023-08-03

## 2023-08-10 ENCOUNTER — OFFICE VISIT (OUTPATIENT)
Dept: OBGYN | Age: 43
End: 2023-08-10
Payer: COMMERCIAL

## 2023-08-10 VITALS
BODY MASS INDEX: 29.77 KG/M2 | HEIGHT: 63 IN | WEIGHT: 168 LBS | SYSTOLIC BLOOD PRESSURE: 107 MMHG | DIASTOLIC BLOOD PRESSURE: 74 MMHG

## 2023-08-10 DIAGNOSIS — Z01.419 ENCOUNTER FOR WELL WOMAN EXAM WITH ROUTINE GYNECOLOGICAL EXAM: ICD-10-CM

## 2023-08-10 DIAGNOSIS — Z12.31 VISIT FOR SCREENING MAMMOGRAM: Primary | ICD-10-CM

## 2023-08-10 PROCEDURE — 99396 PREV VISIT EST AGE 40-64: CPT | Performed by: OBSTETRICS & GYNECOLOGY

## 2023-08-11 NOTE — PROGRESS NOTES
8/10/23    Arsenio Lancaster  1980    Chief Complaint   Patient presents with    Gynecologic Exam     pt here for annual, had hyster with right ovary removal, hrt-premarin, last pap 2016-normal, Mammo 08/10/2022 negative, no c/o.          Milvia Ravi is a 38 y.o. female who presents today for evaluation of annual examination    Past Medical History:   Diagnosis Date    Abnormal uterine bleeding (AUB)     Anemia     Cancer ([address]) 2022    appendix - surgery    Dysmenorrhea     HSV-1 (herpes simplex virus 1) infection     Hx of trichomoniasis     Infertility, female     Irregular menses     Triplet pregnancy     UTI (urinary tract infection)     Vaginal discharge     Vaginal itching        Past Surgical History:   Procedure Laterality Date    ABDOMINOPLASTY  2018    APPENDECTOMY  2022    [address], LOW TRANSVERSE      , ,     COLONOSCOPY N/A 3/16/2022    COLONOSCOPY DIAGNOSTIC performed by Khoi Borges MD at [place] [address]    HERNIA REPAIR  2015    ventral hernia    [facility]ST[facility]CTOMY ( [facility])  2017    LAPAROTOMY N/A 2022    LAPAROTOMY EXPLORATORY APPENDECTOMY RIGHT OOPHERECTOMY FOR OVARIAN TORSION performed by Khoi Borges MD at [facility] OR    LIPECTOMY      SALPINGO-OOPHORECTOMY Left 2022    LEFT SALPINGO OOPHORECTOMY LAPAROSCOPIC performed by Suyapa Gutiérrez MD at [address] [facility]  2018    TONSILLECTOMY         Social History     Tobacco Use    Smoking status: Former     Packs/day: 1.50     Years: 20.00     Pack years: 30.00     Types: Cigarettes     Start date:      Quit date: 2011     Years since quittin.2    Smokeless tobacco: Never   Vaping Use    Vaping Use: Never used   Substance Use Topics    Alcohol use: Yes     Comment: occ    Drug use: No       Family History   Problem Relation Age of Onset    Other Mother     Heart Disease Father     Heart Attack Father     Breast Cancer Neg

## 2023-08-18 ENCOUNTER — HOSPITAL ENCOUNTER (OUTPATIENT)
Dept: WOMENS IMAGING | Age: 43
Discharge: HOME | End: 2023-08-18
Attending: OBSTETRICS & GYNECOLOGY
Payer: COMMERCIAL

## 2023-08-18 DIAGNOSIS — Z12.31 VISIT FOR SCREENING MAMMOGRAM: ICD-10-CM

## 2023-08-18 PROCEDURE — 77063 BREAST TOMOSYNTHESIS BI: CPT

## 2023-08-31 RX ORDER — FERROUS SULFATE 325(65) MG
TABLET ORAL
Qty: 30 TABLET | Refills: 0 | Status: SHIPPED | OUTPATIENT
Start: 2023-08-31

## 2023-10-05 RX ORDER — FERROUS SULFATE 325(65) MG
TABLET ORAL
Qty: 30 TABLET | Refills: 0 | Status: SHIPPED | OUTPATIENT
Start: 2023-10-05

## 2023-10-16 RX ORDER — FOLIC ACID 1 MG/1
TABLET ORAL
Qty: 30 TABLET | Refills: 0 | Status: SHIPPED | OUTPATIENT
Start: 2023-10-16

## 2023-10-26 RX ORDER — FERROUS SULFATE 325(65) MG
1 TABLET ORAL
Qty: 30 TABLET | Refills: 0 | Status: SHIPPED | OUTPATIENT
Start: 2023-10-26

## 2023-11-17 RX ORDER — FOLIC ACID 1 MG/1
TABLET ORAL
Qty: 30 TABLET | Refills: 0 | Status: SHIPPED | OUTPATIENT
Start: 2023-11-17

## 2023-11-27 RX ORDER — FERROUS SULFATE 325(65) MG
1 TABLET ORAL
Qty: 30 TABLET | Refills: 2 | Status: SHIPPED | OUTPATIENT
Start: 2023-11-27

## 2024-01-22 RX ORDER — FOLIC ACID 1 MG/1
TABLET ORAL
Qty: 30 TABLET | Refills: 3 | Status: SHIPPED | OUTPATIENT
Start: 2024-01-22

## 2024-03-11 RX ORDER — FERROUS SULFATE 325(65) MG
1 TABLET ORAL
Qty: 30 TABLET | Refills: 3 | Status: SHIPPED | OUTPATIENT
Start: 2024-03-11

## 2024-03-21 ENCOUNTER — OFFICE VISIT (OUTPATIENT)
Dept: ONCOLOGY | Age: 44
End: 2024-03-21
Payer: COMMERCIAL

## 2024-03-21 ENCOUNTER — HOSPITAL ENCOUNTER (OUTPATIENT)
Dept: INFUSION THERAPY | Age: 44
Discharge: HOME | End: 2024-03-21
Payer: COMMERCIAL

## 2024-03-21 VITALS
DIASTOLIC BLOOD PRESSURE: 66 MMHG | HEART RATE: 90 BPM | HEIGHT: 63 IN | OXYGEN SATURATION: 99 % | WEIGHT: 180 LBS | BODY MASS INDEX: 31.89 KG/M2 | TEMPERATURE: 97.8 F | SYSTOLIC BLOOD PRESSURE: 114 MMHG

## 2024-03-21 DIAGNOSIS — D37.3 LOW GRADE MUCINOUS NEOPLASM OF APPENDIX: Primary | ICD-10-CM

## 2024-03-21 PROCEDURE — G8427 DOCREV CUR MEDS BY ELIG CLIN: HCPCS | Performed by: INTERNAL MEDICINE

## 2024-03-21 PROCEDURE — 99213 OFFICE O/P EST LOW 20 MIN: CPT | Performed by: INTERNAL MEDICINE

## 2024-03-21 PROCEDURE — G8484 FLU IMMUNIZE NO ADMIN: HCPCS | Performed by: INTERNAL MEDICINE

## 2024-03-21 PROCEDURE — 1036F TOBACCO NON-USER: CPT | Performed by: INTERNAL MEDICINE

## 2024-03-21 PROCEDURE — 99211 OFF/OP EST MAY X REQ PHY/QHP: CPT

## 2024-03-21 PROCEDURE — G8417 CALC BMI ABV UP PARAM F/U: HCPCS | Performed by: INTERNAL MEDICINE

## 2024-03-21 ASSESSMENT — PATIENT HEALTH QUESTIONNAIRE - PHQ9
SUM OF ALL RESPONSES TO PHQ QUESTIONS 1-9: 0
1. LITTLE INTEREST OR PLEASURE IN DOING THINGS: NOT AT ALL
SUM OF ALL RESPONSES TO PHQ QUESTIONS 1-9: 0
SUM OF ALL RESPONSES TO PHQ9 QUESTIONS 1 & 2: 0
2. FEELING DOWN, DEPRESSED OR HOPELESS: NOT AT ALL

## 2024-03-21 NOTE — PROGRESS NOTES
MA Rooming Questions  Patient: Vivian Russell  MRN: [id number]    Date: 3/21/2024        1. Do you have any new issues?   no         2. Do you need any refills on medications?    no    3. Have you had any imaging done since your last visit?   no    4. Have you been hospitalized or seen in the emergency room since your last visit here?   yes - was in the ER in Dec for a sinus and ear infection     5. Did the patient have a depression screening completed today? Yes    PHQ-9 Total Score: 0 (3/21/2024  3:30 PM)       PHQ-9 Given to (if applicable):               PHQ-9 Score (if applicable):                     [] Positive     []  Negative              Does question #9 need addressed (if applicable)                     [] Yes    []  No               Janette Lind MA      
mucinous neoplasia and she is status post appendicectomy with negative surgical margins, I recommend for close observation.    I recommend to regularly check CEA, CA 19-9 and  level in future to monitor disease progression.    Colorectal cancer and other tumors involving the gastrointestinal tract, endometrium, ovary, breast, and kidney have been reported in patients with appendiceal mucinous lesions.    Recommend to have screening colonoscopy, regular GYN follow up and mammogram.     Had colonoscopy on 3/16/2022 by Dr. Reynoso and it only showed internal hemorrhoids.     She underwent left salpingo-oophorectomy on 6/21/22.     On March 21, 2024, she presented to me for follow up.  I have been following her for pT3 low-grade mucinous appendiceal neoplasm and she is status post surgery.  She has been under close observation since then.    Laboratory work-ups on 2/11/2022 showed mild elevation in  and iron deficiency.     Repeat labs on 9/13/22 and 3/14/23 showed normal , CEA and CA 19-9. Her iron deficiency is stable (ferritin 64) on 3/14/23. She has folate deficiency on 9/13/22 labs. Her B12 level was 279.7 on 3/14/23.     She has gastric bypass surgery before. I asked her to take B12 and other vitamins (ferrous sulfate, folic acid and Multivitamins) regularly. She stated that her bariatric surgeon check all her vitamin levels around 1/2024 and they were normal according to her.     She was seen by Dr. Castro and she is s/p left salphigo oophorectomy on 6/21/22. She is also s/p hysterecotmy and right salpingo oophorectomy. Recommend to f/u with GYN regularly.     Mammogram done on 8/9/22 showed no evidence of malignancy.      Already had colonoscopy on 3/16/22. I will check her tumor marker in 12 months.     She doesn't have any significant symptoms at today visit.      Past Medical History:     Significant for  1.  History of obesity status post gastric bypass surgery in 2016    Past Surgery History:

## 2024-04-09 RX ORDER — FERROUS SULFATE 325(65) MG
1 TABLET ORAL
Qty: 90 TABLET | Refills: 1 | Status: SHIPPED | OUTPATIENT
Start: 2024-04-09

## 2024-04-17 ENCOUNTER — OFFICE VISIT (OUTPATIENT)
Dept: OBGYN | Age: 44
End: 2024-04-17
Payer: COMMERCIAL

## 2024-04-17 VITALS
WEIGHT: 180 LBS | DIASTOLIC BLOOD PRESSURE: 86 MMHG | HEIGHT: 63 IN | SYSTOLIC BLOOD PRESSURE: 125 MMHG | BODY MASS INDEX: 31.89 KG/M2

## 2024-04-17 DIAGNOSIS — R61 NIGHT SWEATS: Primary | ICD-10-CM

## 2024-04-17 PROCEDURE — 1036F TOBACCO NON-USER: CPT | Performed by: OBSTETRICS & GYNECOLOGY

## 2024-04-17 PROCEDURE — 99214 OFFICE O/P EST MOD 30 MIN: CPT | Performed by: OBSTETRICS & GYNECOLOGY

## 2024-04-17 PROCEDURE — G8417 CALC BMI ABV UP PARAM F/U: HCPCS | Performed by: OBSTETRICS & GYNECOLOGY

## 2024-04-17 PROCEDURE — G8427 DOCREV CUR MEDS BY ELIG CLIN: HCPCS | Performed by: OBSTETRICS & GYNECOLOGY

## 2024-04-17 SDOH — ECONOMIC STABILITY: FOOD INSECURITY: WITHIN THE PAST 12 MONTHS, YOU WORRIED THAT YOUR FOOD WOULD RUN OUT BEFORE YOU GOT MONEY TO BUY MORE.: NEVER TRUE

## 2024-04-17 SDOH — ECONOMIC STABILITY: FOOD INSECURITY: WITHIN THE PAST 12 MONTHS, THE FOOD YOU BOUGHT JUST DIDN'T LAST AND YOU DIDN'T HAVE MONEY TO GET MORE.: NEVER TRUE

## 2024-04-17 SDOH — ECONOMIC STABILITY: INCOME INSECURITY: HOW HARD IS IT FOR YOU TO PAY FOR THE VERY BASICS LIKE FOOD, HOUSING, MEDICAL CARE, AND HEATING?: NOT HARD AT ALL

## 2024-04-18 NOTE — PROGRESS NOTES
24    Vivian Russell  1980    Chief Complaint   Patient presents with    Other     Pt c/o worsening menopausal sx, hot flashes, night sweats, irritability and insomnia. States premarin 0.3 mg was helping but st's \" its like it just stopped working\".         Vivian Russell is a 43 y.o. female who presents today for evaluation of see above.    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    Dysmenorrhea     HSV-1 (herpes simplex virus 1) infection     Hx of trichomoniasis     Infertility, female     Irregular menses     Triplet pregnancy     UTI (urinary tract infection)     Vaginal discharge     Vaginal itching        Past Surgical History:   Procedure Laterality Date    ABDOMINOPLASTY  2018    APPENDECTOMY  2022     SECTION, LOW TRANSVERSE      , ,     COLONOSCOPY N/A 3/16/2022    COLONOSCOPY DIAGNOSTIC performed by Huang Reynoso MD at [facility] ENDOSCOPY    ENDOMETRIAL ABLATION      GASTRIC BYPASS SURGERY  2016    HERNIA REPAIR  2015    ventral hernia    HYSTERECTOMY (CERVIX STATUS UNKNOWN)  2017    LAPAROTOMY N/A 2022    LAPAROTOMY EXPLORATORY APPENDECTOMY RIGHT OOPHERECTOMY FOR OVARIAN TORSION performed by Huang Reynoso MD at [facility] OR    LIPECTOMY      SALPINGO-OOPHORECTOMY Left 2022    LEFT SALPINGO OOPHORECTOMY LAPAROSCOPIC performed by Piter Castro MD at [facility] OR    SINUS SURGERY  2018    TONSILLECTOMY         Social History     Tobacco Use    Smoking status: Former     Current packs/day: 0.00     Average packs/day: 1.5 packs/day for 20.3 years (30.5 ttl pk-yrs)     Types: Cigarettes     Start date:      Quit date: 2011     Years since quittin.9    Smokeless tobacco: Never   Vaping Use    Vaping Use: Never used   Substance Use Topics    Alcohol use: Yes     Comment: occ    Drug use: No       Family History   Problem Relation Age of Onset    Other Mother     Heart

## 2024-05-21 RX ORDER — FOLIC ACID 1 MG/1
TABLET ORAL
Qty: 30 TABLET | Refills: 0 | Status: SHIPPED | OUTPATIENT
Start: 2024-05-21

## 2024-08-19 ENCOUNTER — OFFICE VISIT (OUTPATIENT)
Dept: OBGYN | Age: 44
End: 2024-08-19
Payer: COMMERCIAL

## 2024-08-19 VITALS
HEIGHT: 63 IN | BODY MASS INDEX: 31.71 KG/M2 | SYSTOLIC BLOOD PRESSURE: 125 MMHG | WEIGHT: 179 LBS | DIASTOLIC BLOOD PRESSURE: 83 MMHG

## 2024-08-19 DIAGNOSIS — N95.1 MENOPAUSAL SYMPTOMS: ICD-10-CM

## 2024-08-19 DIAGNOSIS — Z01.419 ENCOUNTER FOR WELL WOMAN EXAM WITH ROUTINE GYNECOLOGICAL EXAM: Primary | ICD-10-CM

## 2024-08-19 PROCEDURE — 99396 PREV VISIT EST AGE 40-64: CPT | Performed by: OBSTETRICS & GYNECOLOGY

## 2024-08-19 SDOH — ECONOMIC STABILITY: FOOD INSECURITY: WITHIN THE PAST 12 MONTHS, YOU WORRIED THAT YOUR FOOD WOULD RUN OUT BEFORE YOU GOT MONEY TO BUY MORE.: NEVER TRUE

## 2024-08-19 SDOH — ECONOMIC STABILITY: FOOD INSECURITY: WITHIN THE PAST 12 MONTHS, THE FOOD YOU BOUGHT JUST DIDN'T LAST AND YOU DIDN'T HAVE MONEY TO GET MORE.: NEVER TRUE

## 2024-08-19 SDOH — ECONOMIC STABILITY: INCOME INSECURITY: HOW HARD IS IT FOR YOU TO PAY FOR THE VERY BASICS LIKE FOOD, HOUSING, MEDICAL CARE, AND HEATING?: NOT VERY HARD

## 2024-08-19 NOTE — PROGRESS NOTES
soft.      Hernia: There is no hernia in the left inguinal area or right inguinal area.   Genitourinary:     General: Normal vulva.      Exam position: Lithotomy position.      Pubic Area: No rash or pubic lice.       Labia:         Right: No rash, tenderness, lesion or injury.         Left: No rash, tenderness, lesion or injury.       Urethra: No prolapse, urethral pain, urethral swelling or urethral lesion.      Vagina: Normal.      Cervix: Normal.      Uterus: Normal.       Adnexa: Right adnexa normal and left adnexa normal.      Rectum: Normal.   Musculoskeletal:      Cervical back: Normal range of motion.   Lymphadenopathy:      Lower Body: No right inguinal adenopathy. No left inguinal adenopathy.   Skin:     General: Skin is warm.   Neurological:      Mental Status: She is alert.         No results found for this visit on 08/19/24.    ASSESSMENT AND PLAN   Diagnosis Orders   1. Encounter for well woman exam with routine gynecological exam        2. Menopausal symptoms            Data Unavailable     Return in about 1 year (around 8/19/2025), or if symptoms worsen or fail to improve, for Annual examination.    Piter Castro MD

## 2024-11-05 RX ORDER — FOLIC ACID 1 MG/1
TABLET ORAL
Qty: 30 TABLET | Refills: 0 | Status: SHIPPED | OUTPATIENT
Start: 2024-11-05

## 2024-12-03 RX ORDER — FOLIC ACID 1 MG/1
TABLET ORAL
Qty: 30 TABLET | Refills: 0 | Status: SHIPPED | OUTPATIENT
Start: 2024-12-03

## 2024-12-30 RX ORDER — FOLIC ACID 1 MG/1
TABLET ORAL
Qty: 30 TABLET | Refills: 0 | Status: SHIPPED | OUTPATIENT
Start: 2024-12-30

## 2025-01-28 RX ORDER — FOLIC ACID 1 MG/1
TABLET ORAL
Qty: 30 TABLET | Refills: 0 | Status: SHIPPED | OUTPATIENT
Start: 2025-01-28

## 2025-02-28 RX ORDER — FOLIC ACID 1 MG/1
TABLET ORAL
Qty: 30 TABLET | Refills: 0 | Status: SHIPPED | OUTPATIENT
Start: 2025-02-28

## 2025-03-13 ENCOUNTER — HOSPITAL ENCOUNTER (OUTPATIENT)
Dept: INFUSION THERAPY | Age: 45
Discharge: HOME | End: 2025-03-13
Payer: COMMERCIAL

## 2025-03-13 DIAGNOSIS — D37.3 LOW GRADE MUCINOUS NEOPLASM OF APPENDIX: ICD-10-CM

## 2025-03-13 LAB
ALBUMIN SERPL-MCNC: 3.8 G/DL (ref 3.4–5)
ALBUMIN/GLOB SERPL: 1.5 {RATIO} (ref 1.1–2.2)
ALP SERPL-CCNC: 83 U/L (ref 40–129)
ALT SERPL-CCNC: 22 U/L (ref 10–40)
ANION GAP SERPL CALCULATED.3IONS-SCNC: 9 MMOL/L (ref 9–17)
AST SERPL-CCNC: 35 U/L (ref 15–37)
BASOPHILS # BLD: 0.03 K/UL
BASOPHILS NFR BLD: 1 % (ref 0–1)
BILIRUB SERPL-MCNC: <0.2 MG/DL (ref 0–1)
BUN SERPL-MCNC: 10 MG/DL (ref 7–20)
CALCIUM SERPL-MCNC: 8.9 MG/DL (ref 8.3–10.6)
CEA SERPL-MCNC: 1.7 NG/ML (ref 0–5)
CHLORIDE SERPL-SCNC: 107 MMOL/L (ref 99–110)
CO2 SERPL-SCNC: 26 MMOL/L (ref 21–32)
CREAT SERPL-MCNC: 0.7 MG/DL (ref 0.6–1.1)
EOSINOPHIL # BLD: 0.29 K/UL
EOSINOPHILS RELATIVE PERCENT: 6 % (ref 0–3)
ERYTHROCYTE [DISTWIDTH] IN BLOOD BY AUTOMATED COUNT: 12.3 % (ref 11.7–14.9)
GFR, ESTIMATED: 85 ML/MIN/1.73M2
GLUCOSE SERPL-MCNC: 58 MG/DL (ref 74–99)
HCT VFR BLD AUTO: 39.2 % (ref 37–47)
HGB BLD-MCNC: 12.1 G/DL (ref 12.5–16)
LYMPHOCYTES NFR BLD: 1.76 K/UL
LYMPHOCYTES RELATIVE PERCENT: 37 % (ref 24–44)
MCH RBC QN AUTO: 30.3 PG (ref 27–31)
MCHC RBC AUTO-ENTMCNC: 30.9 G/DL (ref 32–36)
MCV RBC AUTO: 98 FL (ref 78–100)
MONOCYTES NFR BLD: 0.38 K/UL
MONOCYTES NFR BLD: 8 % (ref 0–4)
NEUTROPHILS NFR BLD: 48 % (ref 36–66)
NEUTS SEG NFR BLD: 2.27 K/UL
PLATELET # BLD AUTO: 257 K/UL (ref 140–440)
PMV BLD AUTO: 9.6 FL (ref 7.5–11.1)
POTASSIUM SERPL-SCNC: 4.3 MMOL/L (ref 3.5–5.1)
PROT SERPL-MCNC: 6.5 G/DL (ref 6.4–8.2)
RBC # BLD AUTO: 4 M/UL (ref 4.2–5.4)
SODIUM SERPL-SCNC: 142 MMOL/L (ref 136–145)
WBC OTHER # BLD: 4.7 K/UL (ref 4–10.5)

## 2025-03-13 PROCEDURE — 80053 COMPREHEN METABOLIC PANEL: CPT

## 2025-03-13 PROCEDURE — 36415 COLL VENOUS BLD VENIPUNCTURE: CPT

## 2025-03-13 PROCEDURE — 85025 COMPLETE CBC W/AUTO DIFF WBC: CPT

## 2025-03-13 PROCEDURE — 86304 IMMUNOASSAY TUMOR CA 125: CPT

## 2025-03-13 PROCEDURE — 82378 CARCINOEMBRYONIC ANTIGEN: CPT

## 2025-03-13 PROCEDURE — 86301 IMMUNOASSAY TUMOR CA 19-9: CPT

## 2025-03-14 LAB
MISCELLANEOUS LAB TEST RESULT: NORMAL
MISCELLANEOUS LAB TEST RESULT: NORMAL
TEST NAME: NORMAL
TEST NAME: NORMAL

## 2025-03-21 ENCOUNTER — OFFICE VISIT (OUTPATIENT)
Dept: ONCOLOGY | Age: 45
End: 2025-03-21
Payer: COMMERCIAL

## 2025-03-21 VITALS
WEIGHT: 184.2 LBS | DIASTOLIC BLOOD PRESSURE: 91 MMHG | OXYGEN SATURATION: 98 % | BODY MASS INDEX: 32.64 KG/M2 | HEART RATE: 91 BPM | HEIGHT: 63 IN | SYSTOLIC BLOOD PRESSURE: 126 MMHG | TEMPERATURE: 97.9 F

## 2025-03-21 DIAGNOSIS — D37.3 LOW GRADE MUCINOUS NEOPLASM OF APPENDIX: Primary | ICD-10-CM

## 2025-03-21 PROCEDURE — 1036F TOBACCO NON-USER: CPT | Performed by: INTERNAL MEDICINE

## 2025-03-21 PROCEDURE — G8417 CALC BMI ABV UP PARAM F/U: HCPCS | Performed by: INTERNAL MEDICINE

## 2025-03-21 PROCEDURE — 99213 OFFICE O/P EST LOW 20 MIN: CPT | Performed by: INTERNAL MEDICINE

## 2025-03-21 PROCEDURE — G8427 DOCREV CUR MEDS BY ELIG CLIN: HCPCS | Performed by: INTERNAL MEDICINE

## 2025-03-21 RX ORDER — OMEPRAZOLE 20 MG/1
CAPSULE, DELAYED RELEASE ORAL
COMMUNITY
Start: 2025-01-01

## 2025-03-21 RX ORDER — HYDROCHLOROTHIAZIDE 12.5 MG/1
12.5 TABLET ORAL DAILY PRN
COMMUNITY
Start: 2024-12-24

## 2025-03-21 RX ORDER — LOSARTAN POTASSIUM AND HYDROCHLOROTHIAZIDE 12.5; 5 MG/1; MG/1
TABLET ORAL
COMMUNITY
Start: 2025-01-05

## 2025-03-21 ASSESSMENT — PATIENT HEALTH QUESTIONNAIRE - PHQ9
SUM OF ALL RESPONSES TO PHQ QUESTIONS 1-9: 0
1. LITTLE INTEREST OR PLEASURE IN DOING THINGS: NOT AT ALL
2. FEELING DOWN, DEPRESSED OR HOPELESS: NOT AT ALL

## 2025-03-21 NOTE — PROGRESS NOTES
MA Rooming Questions  Patient: Vivian Russell  MRN: [id number]    Date: 3/21/2025        1. Do you have any new issues?   no         2. Do you need any refills on medications?    no    3. Have you had any imaging done since your last visit?   yes - labs 3/13    4. Have you been hospitalized or seen in the emergency room since your last visit here?   yes - er 10/26 and 3/20    5. Did the patient have a depression screening completed today? Yes    PHQ-9 Total Score: 0 (3/21/2025  3:47 PM)       PHQ-9 Given to (if applicable):               PHQ-9 Score (if applicable):                     [] Positive     []  Negative              Does question #9 need addressed (if applicable)                     [] Yes    []  No               Frieda Plummer CMA

## 2025-03-21 NOTE — PROGRESS NOTES
Patient Name:  Vivian Russell  Patient :  1980  Patient MRN:  [id number]     Primary Oncologist: Vipul Childers MD  Referring Provider: Lev Sevilla MD     Date of Service: 3/21/2025      Chief Complaint:    Chief Complaint   Patient presents with    Follow-up    Results     Patient Active Problem List:     Gastroenteritis     Encounter for supervision of normal pregnancy in multigravida     Status post repeat low transverse  section     Ventral hernia     S/P repair of ventral hernia     Abdominal wall mass of epigastric region    HPI:   Vivian Russell is a 42-year-old very pleasant female with medical history significant for history of obesity, status post gastric bypass surgery in 2016, initially referred to me on 2022 for 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    She initially presented to [facility] on 2022 with worsening abdominal pain.  She required exploratory laparotomy on 2022.  She was found to have hemoperitoneum secondary to torsion and ischemic right adnexa and abnormal appendix with mucinous growth at the tip of the appendix.  She is status post right oophorectomy and appendicectomy and abdominal washout.    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  1 small benign mesoappendiceal lymph node.  Tumor size was 2.9 x 2 x 1 cm.  Acellular mucin invades subserosa or mesoappendix but does not extend to the serosal surface.  All margins negative for noninvasive tumor and mucin. Proximal mucosal margin uninvolved and approximately at least 5 cm away.  Mesenteric margins is also uninvolved and approximately 1.5 cm away.  There is no lymphovascular invasion, perineural invasion or tumor deposits [pT3 (LAMN with acellular mucin or mucinous epithelium that extends into the subserosa), pN0].    Since she is found to have LAMN, she was referred to me for further evaluation.     Since she only has pT3 low grade

## 2025-04-02 RX ORDER — FOLIC ACID 1 MG/1
1000 TABLET ORAL DAILY
Qty: 90 TABLET | Refills: 0 | Status: SHIPPED | OUTPATIENT
Start: 2025-04-02

## 2025-04-10 ENCOUNTER — OFFICE VISIT (OUTPATIENT)
Dept: OBGYN | Age: 45
End: 2025-04-10
Payer: COMMERCIAL

## 2025-04-10 VITALS
WEIGHT: 183 LBS | HEIGHT: 63 IN | HEART RATE: 86 BPM | BODY MASS INDEX: 32.43 KG/M2 | SYSTOLIC BLOOD PRESSURE: 132 MMHG | DIASTOLIC BLOOD PRESSURE: 92 MMHG

## 2025-04-10 DIAGNOSIS — N89.8 VAGINAL DISCHARGE: Primary | ICD-10-CM

## 2025-04-10 PROCEDURE — 1036F TOBACCO NON-USER: CPT | Performed by: OBSTETRICS & GYNECOLOGY

## 2025-04-10 PROCEDURE — G8427 DOCREV CUR MEDS BY ELIG CLIN: HCPCS | Performed by: OBSTETRICS & GYNECOLOGY

## 2025-04-10 PROCEDURE — 99214 OFFICE O/P EST MOD 30 MIN: CPT | Performed by: OBSTETRICS & GYNECOLOGY

## 2025-04-10 PROCEDURE — G8417 CALC BMI ABV UP PARAM F/U: HCPCS | Performed by: OBSTETRICS & GYNECOLOGY

## 2025-04-10 RX ORDER — FLUCONAZOLE 150 MG/1
150 TABLET ORAL ONCE
Qty: 1 TABLET | Refills: 0 | Status: SHIPPED | OUTPATIENT
Start: 2025-04-10 | End: 2025-04-10

## 2025-04-10 SDOH — ECONOMIC STABILITY: FOOD INSECURITY: WITHIN THE PAST 12 MONTHS, YOU WORRIED THAT YOUR FOOD WOULD RUN OUT BEFORE YOU GOT MONEY TO BUY MORE.: NEVER TRUE

## 2025-04-10 SDOH — ECONOMIC STABILITY: FOOD INSECURITY: WITHIN THE PAST 12 MONTHS, THE FOOD YOU BOUGHT JUST DIDN'T LAST AND YOU DIDN'T HAVE MONEY TO GET MORE.: NEVER TRUE

## 2025-04-10 NOTE — PROGRESS NOTES
4/10/25    Vivian Russell  1980    Chief Complaint   Patient presents with    Other     Pt presents today w/ yeast infection sx; c/o vaginal discharge, itching and odor x 1 week.         Vivian Russell is a 44 y.o. female who presents today for evaluation of see above.    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    Dysmenorrhea     HSV-1 (herpes simplex virus 1) infection     Hx of trichomoniasis     Infertility, female     Irregular menses     Triplet pregnancy     UTI (urinary tract infection)     Vaginal discharge     Vaginal itching        Past Surgical History:   Procedure Laterality Date    ABDOMINOPLASTY  2018    APPENDECTOMY  2022     SECTION, LOW TRANSVERSE      , ,     COLONOSCOPY N/A 3/16/2022    COLONOSCOPY DIAGNOSTIC performed by Huang Reynoso MD at [facility] ENDOSCOPY    ENDOMETRIAL ABLATION      GASTRIC BYPASS SURGERY  2016    HERNIA REPAIR  2015    ventral hernia    HYSTERECTOMY (CERVIX STATUS UNKNOWN)  2017    LAPAROTOMY N/A 2022    LAPAROTOMY EXPLORATORY APPENDECTOMY RIGHT OOPHERECTOMY FOR OVARIAN TORSION performed by Huang Reynoso MD at [facility] OR    LIPECTOMY      SALPINGO-OOPHORECTOMY Left 2022    LEFT SALPINGO OOPHORECTOMY LAPAROSCOPIC performed by Piter Castro MD at [facility] OR    SINUS SURGERY  2018    TONSILLECTOMY         Social History     Tobacco Use    Smoking status: Former     Current packs/day: 0.00     Average packs/day: 1.5 packs/day for 20.3 years (30.5 ttl pk-yrs)     Types: Cigarettes     Start date:      Quit date: 2011     Years since quittin.9    Smokeless tobacco: Never   Vaping Use    Vaping status: Never Used   Substance Use Topics    Alcohol use: Yes     Comment: occ    Drug use: No       Family History   Problem Relation Age of Onset    Other Mother     Heart Disease Father     Heart Attack Father     Breast Cancer Neg Hx

## 2025-04-11 LAB
C TRACH DNA SPEC QL NAA+PROBE: NOT DETECTED
CANDIDA RRNA VAG QL PROBE: NOT DETECTED
CANDIDA RRNA VAG QL PROBE: NOT DETECTED
CARBAPENEM RESISTANCE OXA-48 GENE BY PCR: NOT DETECTED
CEPHALOSPORIN RESISTANCE AMPC GENE: ABNORMAL
ESBL RESISTANCE: NOT DETECTED
G VAGINALIS RRNA GENITAL QL PROBE: NOT DETECTED
M HOMINIS DNA BLD QL NAA+PROBE: DETECTED
MACROLIDE RESISTANCE: ABNORMAL
METHICILLIN RESISTANCE: NOT DETECTED
MYCOPLASMA DNA SPEC QL NAA+PROBE: NOT DETECTED
N GONORRHOEA DNA SPEC QL NAA+PROBE: NOT DETECTED
OTHER MICROORG DNA SPEC QL NAA+PROBE: NOT DETECTED
OTHER MICROORG DNA SPEC QL NAA+PROBE: NOT DETECTED
QUINOLONE AND FLUOROQUINOLONE RESISTANCE: NOT DETECTED
T VAGINALIS RRNA SPEC QL NAA+PROBE: DETECTED
TETRACYCLINE RESISTANCE: ABNORMAL
TRIMETHOPRIM/SULFONAMIDE RESISTANCE: NOT DETECTED

## 2025-04-16 ENCOUNTER — RESULTS FOLLOW-UP (OUTPATIENT)
Dept: OBGYN | Age: 45
End: 2025-04-16

## 2025-04-16 RX ORDER — DOXYCYCLINE HYCLATE 100 MG
100 TABLET ORAL 2 TIMES DAILY
Qty: 14 TABLET | Refills: 0 | Status: SHIPPED | OUTPATIENT
Start: 2025-04-16 | End: 2025-04-23

## 2025-04-16 RX ORDER — METRONIDAZOLE 500 MG/1
500 TABLET ORAL 2 TIMES DAILY
Qty: 14 TABLET | Refills: 0 | Status: SHIPPED | OUTPATIENT
Start: 2025-04-16 | End: 2025-04-23

## 2025-06-30 RX ORDER — FOLIC ACID 1 MG/1
1000 TABLET ORAL DAILY
Qty: 90 TABLET | Refills: 1 | Status: SHIPPED | OUTPATIENT
Start: 2025-06-30

## (undated) DEVICE — Device

## (undated) DEVICE — TOWEL,OR,DSP,ST,BLUE,STD,6/PK,12PK/CS: Brand: MEDLINE

## (undated) DEVICE — GLOVE SURG SZ 7 CRM LTX FREE POLYISOPRENE POLYMER BEAD ANTI

## (undated) DEVICE — NEEDLE HYPO 23GA L1.5IN TURQ POLYPR HUB S STL THN WALL IM

## (undated) DEVICE — GUIDE ENDO STEER

## (undated) DEVICE — PAD MATERNITY CURITY ADH STRIP DISP

## (undated) DEVICE — BANDAGE ADH W1XL3IN UNIV PLAS NAT GEN USE STRP

## (undated) DEVICE — KIT,ANTI FOG,W/SPONGE & FLUID,SOFT PACK: Brand: MEDLINE

## (undated) DEVICE — SYRINGE 20ML LL S/C 50

## (undated) DEVICE — SUTURE SZ 0 27IN 5/8 CIR UR-6  TAPER PT VIOLET ABSRB VICRYL J603H

## (undated) DEVICE — SPONGE LAP W18XL18IN WHT COT 4 PLY FLD STRUNG RADPQ DISP ST

## (undated) DEVICE — TUBING INSUFFLATOR HEAT HI FLO SET PNEUMOCLEAR

## (undated) DEVICE — SUTURE VCRL SZ 4-0 L27IN ABSRB UD L19MM FS-2 3/8 CIR REV J422H

## (undated) DEVICE — GOWN,SIRUS,POLYRNF,BRTHSLV,XLN/XL,20/CS: Brand: MEDLINE

## (undated) DEVICE — SUTURE PERMAHAND SZ 2-0 L18IN NONABSORBABLE BLK L26MM SH C012D

## (undated) DEVICE — SUTURE PERMA-HAND SZ 3-0 L18IN 17 STRND NONABSORBABLE BLK SA64H

## (undated) DEVICE — GLOVE SURG SZ 6 CRM LTX FREE POLYISOPRENE POLYMER BEAD ANTI

## (undated) DEVICE — TRAY PREP DRY W/ PREM GLV 2 APPL 6 SPNG 2 UNDPD 1 OVERWRAP

## (undated) DEVICE — WOUND RETRACTOR AND PROTECTOR: Brand: ALEXIS O WOUND PROTECTOR-RETRACTOR

## (undated) DEVICE — PACK,AURORA,LAVH: Brand: MEDLINE

## (undated) DEVICE — YANKAUER,BULB TIP,W/O VENT,RIGID,STERILE: Brand: MEDLINE

## (undated) DEVICE — SUTURE PERMAHAND SZ 2-0 L17X18IN NONABSORBABLE BLK SILK SA65H

## (undated) DEVICE — COVER MAYO STAND CLTH

## (undated) DEVICE — ELECTRODE ES L2.75IN S STL INSUL BLDE W/ SL EDGE

## (undated) DEVICE — SUTURE 1 STRATAFIX SYMMETRIC PDS + 30CM CT-1 SXPP1A435

## (undated) DEVICE — Z DISCONTINUED NO SUB IDED TUBING ETCO2 AD L6.5FT NSL ORAL CVD PRNG NONFLARED TIP OVR

## (undated) DEVICE — GLOVE ORANGE PI 7 1/2   MSG9075

## (undated) DEVICE — PENCIL ES CRD L10FT HND SWCHING ROCK SWCH W/ EDGE COAT BLDE

## (undated) DEVICE — GOWN,ECLIPSE,POLYRNF,BRTHSLV,XL,30/CS: Brand: MEDLINE

## (undated) DEVICE — TROCAR: Brand: KII FIOS FIRST ENTRY

## (undated) DEVICE — PACK,BASIC,SIRUS,V: Brand: MEDLINE

## (undated) DEVICE — SUTURE STRATAFIX SYMMETRIC SZ 1 L18IN ABSRB VLT CT1 L36CM SXPP1A404

## (undated) DEVICE — TUBING, SUCTION, 9/32" X 10', STRAIGHT: Brand: MEDLINE

## (undated) DEVICE — TISSUE RETRIEVAL SYSTEM: Brand: INZII RETRIEVAL SYSTEM

## (undated) DEVICE — SCISSORS ENDOSCP DIA5MM CRV MPLR CAUT W/ RATCH HNDL

## (undated) DEVICE — GAUZE,SPONGE,4"X4",16PLY,XRAY,STRL,LF: Brand: MEDLINE

## (undated) DEVICE — GLOVE SURG SZ 65 L12IN FNGR THK79MIL GRN LTX FREE

## (undated) DEVICE — INTENDED FOR TISSUE SEPARATION, AND OTHER PROCEDURES THAT REQUIRE A SHARP SURGICAL BLADE TO PUNCTURE OR CUT.: Brand: BARD-PARKER ® STAINLESS STEEL BLADES

## (undated) DEVICE — SUTURE PERMAHAND SZ 3-0 L18IN NONABSORBABLE BLK L26MM SH C013D

## (undated) DEVICE — BASIC PACK: Brand: CONVERTORS

## (undated) DEVICE — GLOVE ORANGE PI 8 1/2   MSG9085

## (undated) DEVICE — GLOVE ORANGE PI 8   MSG9080

## (undated) DEVICE — STAPLER INT L55MM CUT LN L53MM STPL LN L57MM BLU B FRM 8

## (undated) DEVICE — COUNTER NDL 30 COUNT FOAM STRP SGL MAG

## (undated) DEVICE — SENSOR PLSE OXMTR AD CBL L3FT ADH TRANSMISSIVE

## (undated) DEVICE — DRAPE,ABDOMINAL,MAJOR,STERILE: Brand: MEDLINE

## (undated) DEVICE — MARKER SURG SKIN UTIL BLK REG TIP NONSMEARING W/ 6IN RUL

## (undated) DEVICE — INSTRUMENT REPROC SEAL/DIVIDE LAP BLUNT TP LIGASURE NANO-COAT 5MMX37CM

## (undated) DEVICE — GOWN,SIRUS,FABRNF,RAGLAN,XL,ST,28/CS: Brand: MEDLINE

## (undated) DEVICE — DRAPE SHEET ULTRAGARD: Brand: MEDLINE

## (undated) DEVICE — SYRINGE MED 10ML LUERLOCK TIP W/O SFTY DISP

## (undated) DEVICE — STERILE LATEX POWDER-FREE SURGICAL GLOVESWITH NITRILE COATING: Brand: PROTEXIS

## (undated) DEVICE — ANTIFOG KIT SOLUTION VI 6 CC SPNG STRL DISP CD003

## (undated) DEVICE — DRAPE, LAVH, STERILE: Brand: MEDLINE

## (undated) DEVICE — GOWN,ECLIPSE,POLYRNF,BRTHSLV,L,30/CS: Brand: MEDLINE

## (undated) DEVICE — SUTURE MCRYL SZ 4-0 L18IN ABSRB UD L19MM PS-2 3/8 CIR PRIM Y496G

## (undated) DEVICE — SUTURE VCRL SZ 3-0 L18IN ABSRB UD L26MM SH 1/2 CIR J864D

## (undated) DEVICE — GLOVE SURG SZ 6 THK91MIL LTX FREE SYN POLYISOPRENE ANTI

## (undated) DEVICE — VIAL ACCESS CANNULA,SMART TIP: Brand: MONOJECT

## (undated) DEVICE — PAD GZ BORD ST 4INX10IN 2X8IN

## (undated) DEVICE — CATHETER,URETHRAL,VINYL,MALE,16",16 FR: Brand: MEDLINE

## (undated) DEVICE — ENDOSCOPY KIT: Brand: MEDLINE INDUSTRIES, INC.